# Patient Record
Sex: MALE | Race: WHITE | NOT HISPANIC OR LATINO | Employment: FULL TIME | URBAN - NONMETROPOLITAN AREA
[De-identification: names, ages, dates, MRNs, and addresses within clinical notes are randomized per-mention and may not be internally consistent; named-entity substitution may affect disease eponyms.]

---

## 2020-09-15 ENCOUNTER — TRANSCRIBE ORDERS (OUTPATIENT)
Dept: ADMINISTRATIVE | Facility: HOSPITAL | Age: 50
End: 2020-09-15

## 2020-09-15 DIAGNOSIS — Z01.818 PREOP EXAMINATION: Primary | ICD-10-CM

## 2021-01-01 ENCOUNTER — APPOINTMENT (OUTPATIENT)
Dept: GENERAL RADIOLOGY | Facility: HOSPITAL | Age: 51
End: 2021-01-01

## 2021-01-01 ENCOUNTER — APPOINTMENT (OUTPATIENT)
Dept: ULTRASOUND IMAGING | Facility: HOSPITAL | Age: 51
End: 2021-01-01

## 2021-01-01 ENCOUNTER — APPOINTMENT (OUTPATIENT)
Dept: CARDIOLOGY | Facility: HOSPITAL | Age: 51
End: 2021-01-01

## 2021-01-01 ENCOUNTER — APPOINTMENT (OUTPATIENT)
Dept: CT IMAGING | Facility: HOSPITAL | Age: 51
End: 2021-01-01

## 2021-01-01 ENCOUNTER — HOSPITAL ENCOUNTER (EMERGENCY)
Facility: HOSPITAL | Age: 51
End: 2021-01-01

## 2021-01-01 ENCOUNTER — APPOINTMENT (OUTPATIENT)
Dept: MRI IMAGING | Facility: HOSPITAL | Age: 51
End: 2021-01-01

## 2021-01-01 ENCOUNTER — APPOINTMENT (OUTPATIENT)
Dept: NUCLEAR MEDICINE | Facility: HOSPITAL | Age: 51
End: 2021-01-01

## 2021-01-01 ENCOUNTER — HOSPITAL ENCOUNTER (EMERGENCY)
Facility: HOSPITAL | Age: 51
Discharge: HOME | End: 2021-08-04
Attending: EMERGENCY MEDICINE | Admitting: EMERGENCY MEDICINE

## 2021-01-01 ENCOUNTER — HOSPITAL ENCOUNTER (INPATIENT)
Facility: HOSPITAL | Age: 51
LOS: 21 days | End: 2021-10-06
Attending: STUDENT IN AN ORGANIZED HEALTH CARE EDUCATION/TRAINING PROGRAM | Admitting: HOSPITALIST

## 2021-01-01 VITALS
RESPIRATION RATE: 21 BRPM | SYSTOLIC BLOOD PRESSURE: 107 MMHG | TEMPERATURE: 98 F | OXYGEN SATURATION: 99 % | HEIGHT: 68 IN | WEIGHT: 210 LBS | DIASTOLIC BLOOD PRESSURE: 58 MMHG | HEART RATE: 77 BPM | BODY MASS INDEX: 31.83 KG/M2

## 2021-01-01 DIAGNOSIS — K29.90 GASTRODUODENITIS: ICD-10-CM

## 2021-01-01 DIAGNOSIS — E13.11 DIABETIC KETOACIDOSIS WITH COMA ASSOCIATED WITH OTHER SPECIFIED DIABETES MELLITUS (HCC): Primary | ICD-10-CM

## 2021-01-01 DIAGNOSIS — J15.9 BACTERIAL PNEUMONIA: ICD-10-CM

## 2021-01-01 DIAGNOSIS — E83.39 HYPERPHOSPHATEMIA: ICD-10-CM

## 2021-01-01 DIAGNOSIS — R25.1 TREMOR: Primary | ICD-10-CM

## 2021-01-01 DIAGNOSIS — N17.9 AKI (ACUTE KIDNEY INJURY) (HCC): ICD-10-CM

## 2021-01-01 DIAGNOSIS — R57.9 SHOCK (HCC): ICD-10-CM

## 2021-01-01 DIAGNOSIS — K85.90 ACUTE PANCREATITIS, UNSPECIFIED COMPLICATION STATUS, UNSPECIFIED PANCREATITIS TYPE: ICD-10-CM

## 2021-01-01 DIAGNOSIS — U07.1 COVID-19: ICD-10-CM

## 2021-01-01 DIAGNOSIS — J96.01 ACUTE RESPIRATORY FAILURE WITH HYPOXIA (HCC): ICD-10-CM

## 2021-01-01 LAB
A FLAVUS AB SER QL ID: NEGATIVE
A FUMIGATUS AB SER QL ID: NEGATIVE
A NIGER AB SER QL ID: NEGATIVE
A-A DO2: 166.5 MMHG (ref 0–300)
A-A DO2: 193.5 MMHG (ref 0–300)
A-A DO2: 225.5 MMHG (ref 0–300)
A-A DO2: 253.3 MMHG (ref 0–300)
A-A DO2: 269.1 MMHG (ref 0–300)
A-A DO2: 281.9 MMHG (ref 0–300)
A-A DO2: 283.8 MMHG (ref 0–300)
A-A DO2: 296.7 MMHG (ref 0–300)
A-A DO2: 297.6 MMHG (ref 0–300)
A-A DO2: 308.9 MMHG (ref 0–300)
A-A DO2: 321.2 MMHG (ref 0–300)
A-A DO2: 324.9 MMHG (ref 0–300)
A-A DO2: 331 MMHG (ref 0–300)
A-A DO2: 340.5 MMHG (ref 0–300)
A-A DO2: 379.8 MMHG (ref 0–300)
A-A DO2: 385.6 MMHG (ref 0–300)
A-A DO2: 400 MMHG (ref 0–300)
A-A DO2: 408 MMHG (ref 0–300)
A-A DO2: 409.4 MMHG (ref 0–300)
A-A DO2: 409.9 MMHG (ref 0–300)
A-A DO2: 410.1 MMHG (ref 0–300)
A-A DO2: 429.9 MMHG (ref 0–300)
A-A DO2: 437.2 MMHG (ref 0–300)
A-A DO2: 441.5 MMHG (ref 0–300)
A-A DO2: 442.6 MMHG (ref 0–300)
A-A DO2: 446.7 MMHG (ref 0–300)
A-A DO2: 495 MMHG (ref 0–300)
A-A DO2: 547.8 MMHG (ref 0–300)
A-A DO2: 550.1 MMHG (ref 0–300)
A-A DO2: 581.8 MMHG (ref 0–300)
A-A DO2: 588.3 MMHG (ref 0–300)
A-A DO2: 609.6 MMHG (ref 0–300)
ABO GROUP BLD: NORMAL
ACETONE BLD QL: ABNORMAL
ALBUMIN SERPL-MCNC: 1.46 G/DL (ref 3.5–5.2)
ALBUMIN SERPL-MCNC: 1.68 G/DL (ref 3.5–5.2)
ALBUMIN SERPL-MCNC: 1.78 G/DL (ref 3.5–5.2)
ALBUMIN SERPL-MCNC: 1.89 G/DL (ref 3.5–5.2)
ALBUMIN SERPL-MCNC: 1.9 G/DL (ref 3.5–5.2)
ALBUMIN SERPL-MCNC: 1.91 G/DL (ref 3.5–5.2)
ALBUMIN SERPL-MCNC: 1.91 G/DL (ref 3.5–5.2)
ALBUMIN SERPL-MCNC: 1.97 G/DL (ref 3.5–5.2)
ALBUMIN SERPL-MCNC: 2.05 G/DL (ref 3.5–5.2)
ALBUMIN SERPL-MCNC: 2.06 G/DL (ref 3.5–5.2)
ALBUMIN SERPL-MCNC: 2.28 G/DL (ref 3.5–5.2)
ALBUMIN SERPL-MCNC: 2.28 G/DL (ref 3.5–5.2)
ALBUMIN SERPL-MCNC: 2.29 G/DL (ref 3.5–5.2)
ALBUMIN SERPL-MCNC: 2.29 G/DL (ref 3.5–5.2)
ALBUMIN SERPL-MCNC: 2.3 G/DL (ref 3.5–5.2)
ALBUMIN SERPL-MCNC: 2.41 G/DL (ref 3.5–5.2)
ALBUMIN SERPL-MCNC: 2.48 G/DL (ref 3.5–5.2)
ALBUMIN SERPL-MCNC: 2.49 G/DL (ref 3.5–5.2)
ALBUMIN SERPL-MCNC: 2.5 G/DL (ref 3.5–5.2)
ALBUMIN SERPL-MCNC: 2.55 G/DL (ref 3.5–5.2)
ALBUMIN SERPL-MCNC: 2.58 G/DL (ref 3.5–5.2)
ALBUMIN SERPL-MCNC: 2.7 G/DL (ref 3.5–5.2)
ALBUMIN SERPL-MCNC: 2.76 G/DL (ref 3.5–5.2)
ALBUMIN SERPL-MCNC: 2.83 G/DL (ref 3.5–5.2)
ALBUMIN SERPL-MCNC: 2.85 G/DL (ref 3.5–5.2)
ALBUMIN SERPL-MCNC: 2.88 G/DL (ref 3.5–5.2)
ALBUMIN SERPL-MCNC: 3.24 G/DL (ref 3.5–5.2)
ALBUMIN SERPL-MCNC: 3.69 G/DL (ref 3.5–5.2)
ALBUMIN SERPL-MCNC: 4.67 G/DL (ref 3.5–5.2)
ALBUMIN/GLOB SERPL: 0.5 G/DL
ALBUMIN/GLOB SERPL: 0.6 G/DL
ALBUMIN/GLOB SERPL: 0.7 G/DL
ALBUMIN/GLOB SERPL: 0.8 G/DL
ALBUMIN/GLOB SERPL: 0.9 G/DL
ALBUMIN/GLOB SERPL: 1.1 G/DL
ALBUMIN/GLOB SERPL: 1.1 G/DL
ALBUMIN/GLOB SERPL: 1.5 G/DL
ALP SERPL-CCNC: 104 U/L (ref 39–117)
ALP SERPL-CCNC: 113 U/L (ref 39–117)
ALP SERPL-CCNC: 116 U/L (ref 39–117)
ALP SERPL-CCNC: 119 U/L (ref 39–117)
ALP SERPL-CCNC: 120 U/L (ref 39–117)
ALP SERPL-CCNC: 125 U/L (ref 39–117)
ALP SERPL-CCNC: 136 U/L (ref 39–117)
ALP SERPL-CCNC: 141 U/L (ref 39–117)
ALP SERPL-CCNC: 146 U/L (ref 39–117)
ALP SERPL-CCNC: 153 U/L (ref 39–117)
ALP SERPL-CCNC: 156 U/L (ref 39–117)
ALP SERPL-CCNC: 170 U/L (ref 39–117)
ALP SERPL-CCNC: 180 U/L (ref 39–117)
ALP SERPL-CCNC: 239 U/L (ref 39–117)
ALP SERPL-CCNC: 80 U/L (ref 39–117)
ALP SERPL-CCNC: 80 U/L (ref 39–117)
ALP SERPL-CCNC: 81 U/L (ref 39–117)
ALP SERPL-CCNC: 82 U/L (ref 39–117)
ALP SERPL-CCNC: 84 U/L (ref 39–117)
ALP SERPL-CCNC: 85 U/L (ref 39–117)
ALP SERPL-CCNC: 86 U/L (ref 39–117)
ALP SERPL-CCNC: 92 U/L (ref 39–117)
ALP SERPL-CCNC: 93 U/L (ref 39–117)
ALP SERPL-CCNC: 96 U/L (ref 39–117)
ALP SERPL-CCNC: 97 U/L (ref 39–117)
ALT SERPL W P-5'-P-CCNC: 10 U/L (ref 1–41)
ALT SERPL W P-5'-P-CCNC: 11 U/L (ref 1–41)
ALT SERPL W P-5'-P-CCNC: 12 U/L (ref 1–41)
ALT SERPL W P-5'-P-CCNC: 13 U/L (ref 1–41)
ALT SERPL W P-5'-P-CCNC: 14 U/L (ref 1–41)
ALT SERPL W P-5'-P-CCNC: 18 U/L (ref 1–41)
ALT SERPL W P-5'-P-CCNC: 19 U/L (ref 1–41)
ALT SERPL W P-5'-P-CCNC: 21 U/L (ref 1–41)
ALT SERPL W P-5'-P-CCNC: 22 U/L (ref 1–41)
ALT SERPL W P-5'-P-CCNC: 22 U/L (ref 1–41)
ALT SERPL W P-5'-P-CCNC: 23 U/L (ref 1–41)
ALT SERPL W P-5'-P-CCNC: 24 U/L (ref 1–41)
ALT SERPL W P-5'-P-CCNC: 25 U/L (ref 1–41)
ALT SERPL W P-5'-P-CCNC: 25 U/L (ref 1–41)
ALT SERPL W P-5'-P-CCNC: 28 U/L (ref 1–41)
ALT SERPL W P-5'-P-CCNC: 5 U/L (ref 1–41)
ALT SERPL W P-5'-P-CCNC: 6 U/L (ref 1–41)
ALT SERPL W P-5'-P-CCNC: 7 U/L (ref 1–41)
ALT SERPL W P-5'-P-CCNC: 7 U/L (ref 1–41)
ALT SERPL W P-5'-P-CCNC: 9 U/L (ref 1–41)
ALT SERPL W P-5'-P-CCNC: 9 U/L (ref 1–41)
ANA SER QL: NEGATIVE
ANION GAP SERPL CALCULATED.3IONS-SCNC: 10.1 MMOL/L (ref 5–15)
ANION GAP SERPL CALCULATED.3IONS-SCNC: 10.4 MMOL/L (ref 5–15)
ANION GAP SERPL CALCULATED.3IONS-SCNC: 11.1 MMOL/L (ref 5–15)
ANION GAP SERPL CALCULATED.3IONS-SCNC: 12 MMOL/L (ref 5–15)
ANION GAP SERPL CALCULATED.3IONS-SCNC: 12.9 MMOL/L (ref 5–15)
ANION GAP SERPL CALCULATED.3IONS-SCNC: 13.3 MMOL/L (ref 5–15)
ANION GAP SERPL CALCULATED.3IONS-SCNC: 14.2 MMOL/L (ref 5–15)
ANION GAP SERPL CALCULATED.3IONS-SCNC: 14.5 MMOL/L (ref 5–15)
ANION GAP SERPL CALCULATED.3IONS-SCNC: 14.6 MMOL/L (ref 5–15)
ANION GAP SERPL CALCULATED.3IONS-SCNC: 14.8 MMOL/L (ref 5–15)
ANION GAP SERPL CALCULATED.3IONS-SCNC: 15.3 MMOL/L (ref 5–15)
ANION GAP SERPL CALCULATED.3IONS-SCNC: 15.7 MMOL/L (ref 5–15)
ANION GAP SERPL CALCULATED.3IONS-SCNC: 16.5 MMOL/L (ref 5–15)
ANION GAP SERPL CALCULATED.3IONS-SCNC: 16.8 MMOL/L (ref 5–15)
ANION GAP SERPL CALCULATED.3IONS-SCNC: 16.8 MMOL/L (ref 5–15)
ANION GAP SERPL CALCULATED.3IONS-SCNC: 17 MMOL/L (ref 5–15)
ANION GAP SERPL CALCULATED.3IONS-SCNC: 17.3 MMOL/L (ref 5–15)
ANION GAP SERPL CALCULATED.3IONS-SCNC: 17.3 MMOL/L (ref 5–15)
ANION GAP SERPL CALCULATED.3IONS-SCNC: 17.4 MMOL/L (ref 5–15)
ANION GAP SERPL CALCULATED.3IONS-SCNC: 17.6 MMOL/L (ref 5–15)
ANION GAP SERPL CALCULATED.3IONS-SCNC: 17.7 MMOL/L (ref 5–15)
ANION GAP SERPL CALCULATED.3IONS-SCNC: 18.6 MMOL/L (ref 5–15)
ANION GAP SERPL CALCULATED.3IONS-SCNC: 18.7 MMOL/L (ref 5–15)
ANION GAP SERPL CALCULATED.3IONS-SCNC: 19.6 MMOL/L (ref 5–15)
ANION GAP SERPL CALCULATED.3IONS-SCNC: 20 MMOL/L (ref 5–15)
ANION GAP SERPL CALCULATED.3IONS-SCNC: 20.6 MMOL/L (ref 5–15)
ANION GAP SERPL CALCULATED.3IONS-SCNC: 20.6 MMOL/L (ref 5–15)
ANION GAP SERPL CALCULATED.3IONS-SCNC: 20.8 MMOL/L (ref 5–15)
ANION GAP SERPL CALCULATED.3IONS-SCNC: 21.7 MMOL/L (ref 5–15)
ANION GAP SERPL CALCULATED.3IONS-SCNC: 23 MMOL/L (ref 5–15)
ANION GAP SERPL CALCULATED.3IONS-SCNC: 24 MMOL/L (ref 5–15)
ANION GAP SERPL CALCULATED.3IONS-SCNC: 26 MMOL/L (ref 5–15)
ANION GAP SERPL CALCULATED.3IONS-SCNC: 29.1 MMOL/L (ref 5–15)
ANION GAP SERPL CALCULATED.3IONS-SCNC: 39.5 MMOL/L (ref 5–15)
ANION GAP SERPL CALCULATED.3IONS-SCNC: 40.2 MMOL/L (ref 5–15)
APTT PPP: 24.5 SECONDS (ref 25.5–35.4)
APTT PPP: 26.2 SECONDS (ref 25.5–35.4)
APTT PPP: 29.2 SECONDS (ref 25.5–35.4)
APTT PPP: 31.8 SECONDS (ref 25.5–35.4)
APTT PPP: 34.8 SECONDS (ref 25.5–35.4)
APTT PPP: 37.5 SECONDS (ref 25.5–35.4)
APTT PPP: 41.8 SECONDS (ref 25.5–35.4)
APTT PPP: 42.3 SECONDS (ref 25.5–35.4)
APTT PPP: 44.3 SECONDS (ref 25.5–35.4)
APTT PPP: 49 SECONDS (ref 25.5–35.4)
APTT PPP: 51.6 SECONDS (ref 25.5–35.4)
APTT PPP: 51.8 SECONDS (ref 25.5–35.4)
APTT PPP: 52.7 SECONDS (ref 25.5–35.4)
APTT PPP: 52.8 SECONDS (ref 25.5–35.4)
APTT PPP: 52.9 SECONDS (ref 25.5–35.4)
APTT PPP: 53.2 SECONDS (ref 25.5–35.4)
APTT PPP: 54.1 SECONDS (ref 25.5–35.4)
APTT PPP: 55.8 SECONDS (ref 25.5–35.4)
APTT PPP: 55.9 SECONDS (ref 25.5–35.4)
APTT PPP: 56.6 SECONDS (ref 25.5–35.4)
APTT PPP: 56.7 SECONDS (ref 25.5–35.4)
APTT PPP: 60 SECONDS (ref 25.5–35.4)
APTT PPP: 60.2 SECONDS (ref 25.5–35.4)
APTT PPP: 60.6 SECONDS (ref 25.5–35.4)
APTT PPP: 60.8 SECONDS (ref 25.5–35.4)
APTT PPP: 61 SECONDS (ref 25.5–35.4)
APTT PPP: 63.7 SECONDS (ref 25.5–35.4)
APTT PPP: 63.7 SECONDS (ref 25.5–35.4)
APTT PPP: 64.2 SECONDS (ref 25.5–35.4)
APTT PPP: 64.7 SECONDS (ref 25.5–35.4)
APTT PPP: 69.1 SECONDS (ref 25.5–35.4)
APTT PPP: 74.4 SECONDS (ref 25.5–35.4)
APTT PPP: 79.7 SECONDS (ref 25.5–35.4)
APTT PPP: 81.2 SECONDS (ref 25.5–35.4)
APTT PPP: 89.4 SECONDS (ref 25.5–35.4)
APTT PPP: 93.5 SECONDS (ref 25.5–35.4)
APTT PPP: 94.9 SECONDS (ref 25.5–35.4)
APTT PPP: 98 SECONDS (ref 25.5–35.4)
APTT PPP: 98.5 SECONDS (ref 25.5–35.4)
APTT PPP: 99.4 SECONDS (ref 25.5–35.4)
APTT PPP: >100 SECONDS (ref 25.5–35.4)
ARTERIAL PATENCY WRIST A: ABNORMAL
ARTERIAL PATENCY WRIST A: POSITIVE
AST SERPL-CCNC: 10 U/L (ref 1–40)
AST SERPL-CCNC: 14 U/L (ref 1–40)
AST SERPL-CCNC: 16 U/L (ref 1–40)
AST SERPL-CCNC: 17 U/L (ref 1–40)
AST SERPL-CCNC: 17 U/L (ref 1–40)
AST SERPL-CCNC: 18 U/L (ref 1–40)
AST SERPL-CCNC: 21 U/L (ref 1–40)
AST SERPL-CCNC: 22 U/L (ref 1–40)
AST SERPL-CCNC: 23 U/L (ref 1–40)
AST SERPL-CCNC: 27 U/L (ref 1–40)
AST SERPL-CCNC: 28 U/L (ref 1–40)
AST SERPL-CCNC: 35 U/L (ref 1–40)
AST SERPL-CCNC: 35 U/L (ref 1–40)
AST SERPL-CCNC: 37 U/L (ref 1–40)
AST SERPL-CCNC: 42 U/L (ref 1–40)
AST SERPL-CCNC: 46 U/L (ref 1–40)
AST SERPL-CCNC: 52 U/L (ref 1–40)
AST SERPL-CCNC: 63 U/L (ref 1–40)
AST SERPL-CCNC: 8 U/L (ref 1–40)
AST SERPL-CCNC: 9 U/L (ref 1–40)
ATMOSPHERIC PRESS: 724 MMHG
ATMOSPHERIC PRESS: 725 MMHG
ATMOSPHERIC PRESS: 726 MMHG
ATMOSPHERIC PRESS: 726 MMHG
ATMOSPHERIC PRESS: 727 MMHG
ATMOSPHERIC PRESS: 728 MMHG
ATMOSPHERIC PRESS: 729 MMHG
ATMOSPHERIC PRESS: 730 MMHG
ATMOSPHERIC PRESS: 731 MMHG
ATMOSPHERIC PRESS: 731 MMHG
ATMOSPHERIC PRESS: 732 MMHG
ATMOSPHERIC PRESS: 732 MMHG
ATMOSPHERIC PRESS: 733 MMHG
B DERMAT AB TITR SER: NEGATIVE {TITER}
B PARAPERT DNA SPEC QL NAA+PROBE: NOT DETECTED
B PERT DNA SPEC QL NAA+PROBE: NOT DETECTED
BACTERIA SPEC AEROBE CULT: NORMAL
BACTERIA SPEC RESP CULT: ABNORMAL
BACTERIA UR QL AUTO: ABNORMAL /HPF
BASE EXCESS BLDA CALC-SCNC: -0.3 MMOL/L (ref 0–2)
BASE EXCESS BLDA CALC-SCNC: -1.5 MMOL/L (ref 0–2)
BASE EXCESS BLDA CALC-SCNC: -1.6 MMOL/L (ref 0–2)
BASE EXCESS BLDA CALC-SCNC: -12.2 MMOL/L (ref 0–2)
BASE EXCESS BLDA CALC-SCNC: -13.8 MMOL/L (ref 0–2)
BASE EXCESS BLDA CALC-SCNC: -17.3 MMOL/L (ref 0–2)
BASE EXCESS BLDA CALC-SCNC: -17.3 MMOL/L (ref 0–2)
BASE EXCESS BLDA CALC-SCNC: -2.1 MMOL/L (ref 0–2)
BASE EXCESS BLDA CALC-SCNC: -2.5 MMOL/L (ref 0–2)
BASE EXCESS BLDA CALC-SCNC: -2.6 MMOL/L (ref 0–2)
BASE EXCESS BLDA CALC-SCNC: -2.6 MMOL/L (ref 0–2)
BASE EXCESS BLDA CALC-SCNC: -2.8 MMOL/L (ref 0–2)
BASE EXCESS BLDA CALC-SCNC: -25.4 MMOL/L (ref 0–2)
BASE EXCESS BLDA CALC-SCNC: -27.9 MMOL/L (ref 0–2)
BASE EXCESS BLDA CALC-SCNC: -3.3 MMOL/L (ref 0–2)
BASE EXCESS BLDA CALC-SCNC: -3.9 MMOL/L (ref 0–2)
BASE EXCESS BLDA CALC-SCNC: -4.4 MMOL/L (ref 0–2)
BASE EXCESS BLDA CALC-SCNC: -5.3 MMOL/L (ref 0–2)
BASE EXCESS BLDA CALC-SCNC: -5.7 MMOL/L (ref 0–2)
BASE EXCESS BLDA CALC-SCNC: -7.8 MMOL/L (ref 0–2)
BASE EXCESS BLDA CALC-SCNC: -8 MMOL/L (ref 0–2)
BASE EXCESS BLDA CALC-SCNC: -8.7 MMOL/L (ref 0–2)
BASE EXCESS BLDA CALC-SCNC: -9.2 MMOL/L (ref 0–2)
BASE EXCESS BLDA CALC-SCNC: -9.5 MMOL/L (ref 0–2)
BASE EXCESS BLDA CALC-SCNC: -9.8 MMOL/L (ref 0–2)
BASE EXCESS BLDA CALC-SCNC: 0.1 MMOL/L (ref 0–2)
BASE EXCESS BLDA CALC-SCNC: 0.2 MMOL/L (ref 0–2)
BASE EXCESS BLDA CALC-SCNC: 0.4 MMOL/L (ref 0–2)
BASE EXCESS BLDA CALC-SCNC: 1.2 MMOL/L (ref 0–2)
BASE EXCESS BLDA CALC-SCNC: 1.9 MMOL/L (ref 0–2)
BASE EXCESS BLDA CALC-SCNC: 2 MMOL/L (ref 0–2)
BASE EXCESS BLDA CALC-SCNC: 2.4 MMOL/L (ref 0–2)
BASE EXCESS BLDV CALC-SCNC: -12.4 MMOL/L (ref 0–2)
BASE EXCESS BLDV CALC-SCNC: -3.4 MMOL/L (ref 0–2)
BASOPHILS # BLD AUTO: 0.02 10*3/MM3 (ref 0–0.2)
BASOPHILS # BLD AUTO: 0.02 10*3/MM3 (ref 0–0.2)
BASOPHILS # BLD AUTO: 0.03 10*3/MM3 (ref 0–0.2)
BASOPHILS # BLD AUTO: 0.04 10*3/MM3 (ref 0–0.2)
BASOPHILS # BLD AUTO: 0.05 10*3/MM3 (ref 0–0.2)
BASOPHILS # BLD AUTO: 0.09 10*3/MM3 (ref 0–0.2)
BASOPHILS NFR BLD AUTO: 0.1 % (ref 0–1.5)
BASOPHILS NFR BLD AUTO: 0.1 % (ref 0–1.5)
BASOPHILS NFR BLD AUTO: 0.2 % (ref 0–1.5)
BASOPHILS NFR BLD AUTO: 0.3 % (ref 0–1.5)
BASOPHILS NFR BLD AUTO: 0.5 % (ref 0–1.5)
BASOPHILS NFR BLD AUTO: 0.6 % (ref 0–1.5)
BASOPHILS NFR BLD AUTO: 0.7 % (ref 0–1.5)
BASOPHILS NFR BLD AUTO: 0.7 % (ref 0–1.5)
BASOPHILS NFR BLD AUTO: 1.6 % (ref 0–1.5)
BDY SITE: ABNORMAL
BH CV ECHO MEAS - % IVS THICK: 11 %
BH CV ECHO MEAS - % LVPW THICK: 17.9 %
BH CV ECHO MEAS - ACS: 2.4 CM
BH CV ECHO MEAS - ACS: 2.6 CM
BH CV ECHO MEAS - AO MAX PG: 2.7 MMHG
BH CV ECHO MEAS - AO MAX PG: 4.8 MMHG
BH CV ECHO MEAS - AO MEAN PG: 1 MMHG
BH CV ECHO MEAS - AO MEAN PG: 3 MMHG
BH CV ECHO MEAS - AO ROOT AREA (BSA CORRECTED): 1.5
BH CV ECHO MEAS - AO ROOT AREA (BSA CORRECTED): 2
BH CV ECHO MEAS - AO ROOT AREA: 13.2 CM^2
BH CV ECHO MEAS - AO ROOT AREA: 8 CM^2
BH CV ECHO MEAS - AO ROOT DIAM: 3.2 CM
BH CV ECHO MEAS - AO ROOT DIAM: 4.1 CM
BH CV ECHO MEAS - AO V2 MAX: 109 CM/SEC
BH CV ECHO MEAS - AO V2 MAX: 81.8 CM/SEC
BH CV ECHO MEAS - AO V2 MEAN: 56.8 CM/SEC
BH CV ECHO MEAS - AO V2 MEAN: 77.7 CM/SEC
BH CV ECHO MEAS - AO V2 VTI: 18.3 CM
BH CV ECHO MEAS - AO V2 VTI: 19 CM
BH CV ECHO MEAS - BSA(HAYCOCK): 2.1 M^2
BH CV ECHO MEAS - BSA(HAYCOCK): 2.3 M^2
BH CV ECHO MEAS - BSA: 2.1 M^2
BH CV ECHO MEAS - BSA: 2.2 M^2
BH CV ECHO MEAS - BZI_BMI: 29.2 KILOGRAMS/M^2
BH CV ECHO MEAS - BZI_BMI: 33.7 KILOGRAMS/M^2
BH CV ECHO MEAS - BZI_METRIC_HEIGHT: 175.3 CM
BH CV ECHO MEAS - BZI_METRIC_HEIGHT: 175.3 CM
BH CV ECHO MEAS - BZI_METRIC_WEIGHT: 103.4 KG
BH CV ECHO MEAS - BZI_METRIC_WEIGHT: 89.8 KG
BH CV ECHO MEAS - EDV(CUBED): 106.8 ML
BH CV ECHO MEAS - EDV(CUBED): 91.1 ML
BH CV ECHO MEAS - EDV(MOD-SP4): 73.1 ML
BH CV ECHO MEAS - EDV(MOD-SP4): 79.7 ML
BH CV ECHO MEAS - EDV(TEICH): 104.7 ML
BH CV ECHO MEAS - EDV(TEICH): 92.4 ML
BH CV ECHO MEAS - EF(CUBED): 48.4 %
BH CV ECHO MEAS - EF(CUBED): 75.9 %
BH CV ECHO MEAS - EF(MOD-SP4): 44 %
BH CV ECHO MEAS - EF(MOD-SP4): 68.4 %
BH CV ECHO MEAS - EF(TEICH): 40.6 %
BH CV ECHO MEAS - EF(TEICH): 68 %
BH CV ECHO MEAS - ESV(CUBED): 22 ML
BH CV ECHO MEAS - ESV(CUBED): 55.1 ML
BH CV ECHO MEAS - ESV(MOD-SP4): 23.1 ML
BH CV ECHO MEAS - ESV(MOD-SP4): 44.6 ML
BH CV ECHO MEAS - ESV(TEICH): 29.6 ML
BH CV ECHO MEAS - ESV(TEICH): 62.1 ML
BH CV ECHO MEAS - FS: 19.8 %
BH CV ECHO MEAS - FS: 37.8 %
BH CV ECHO MEAS - IVS/LVPW: 1
BH CV ECHO MEAS - IVS/LVPW: 1.1
BH CV ECHO MEAS - IVSD: 1.1 CM
BH CV ECHO MEAS - IVSD: 1.1 CM
BH CV ECHO MEAS - IVSS: 1.3 CM
BH CV ECHO MEAS - LA DIMENSION: 3.2 CM
BH CV ECHO MEAS - LA/AO: 0.78
BH CV ECHO MEAS - LV DIASTOLIC VOL/BSA (35-75): 33.5 ML/M^2
BH CV ECHO MEAS - LV DIASTOLIC VOL/BSA (35-75): 38.7 ML/M^2
BH CV ECHO MEAS - LV MASS(C)D: 175 GRAMS
BH CV ECHO MEAS - LV MASS(C)D: 186.8 GRAMS
BH CV ECHO MEAS - LV MASS(C)DI: 80.1 GRAMS/M^2
BH CV ECHO MEAS - LV MASS(C)DI: 90.8 GRAMS/M^2
BH CV ECHO MEAS - LV MASS(C)S: 161.3 GRAMS
BH CV ECHO MEAS - LV MASS(C)SI: 78.4 GRAMS/M^2
BH CV ECHO MEAS - LV SYSTOLIC VOL/BSA (12-30): 10.6 ML/M^2
BH CV ECHO MEAS - LV SYSTOLIC VOL/BSA (12-30): 21.7 ML/M^2
BH CV ECHO MEAS - LVIDD: 4.5 CM
BH CV ECHO MEAS - LVIDD: 4.7 CM
BH CV ECHO MEAS - LVIDS: 2.8 CM
BH CV ECHO MEAS - LVIDS: 3.8 CM
BH CV ECHO MEAS - LVLD AP4: 5.9 CM
BH CV ECHO MEAS - LVLD AP4: 7.3 CM
BH CV ECHO MEAS - LVLS AP4: 5.6 CM
BH CV ECHO MEAS - LVLS AP4: 6.4 CM
BH CV ECHO MEAS - LVOT AREA (M): 3.5 CM^2
BH CV ECHO MEAS - LVOT AREA: 3.5 CM^2
BH CV ECHO MEAS - LVOT DIAM: 2.1 CM
BH CV ECHO MEAS - LVPWD: 1 CM
BH CV ECHO MEAS - LVPWD: 1.1 CM
BH CV ECHO MEAS - LVPWS: 1.2 CM
BH CV ECHO MEAS - MV A MAX VEL: 49.7 CM/SEC
BH CV ECHO MEAS - MV A MAX VEL: 61.7 CM/SEC
BH CV ECHO MEAS - MV E MAX VEL: 49.7 CM/SEC
BH CV ECHO MEAS - MV E MAX VEL: 53.1 CM/SEC
BH CV ECHO MEAS - MV E/A: 0.81
BH CV ECHO MEAS - MV E/A: 1.1
BH CV ECHO MEAS - PA ACC TIME: 0.07 SEC
BH CV ECHO MEAS - PA ACC TIME: 0.12 SEC
BH CV ECHO MEAS - PA PR(ACCEL): 26.8 MMHG
BH CV ECHO MEAS - PA PR(ACCEL): 45.7 MMHG
BH CV ECHO MEAS - RAP SYSTOLE: 10 MMHG
BH CV ECHO MEAS - RVSP: 35.2 MMHG
BH CV ECHO MEAS - SI(AO): 122 ML/M^2
BH CV ECHO MEAS - SI(AO): 67.4 ML/M^2
BH CV ECHO MEAS - SI(CUBED): 25.2 ML/M^2
BH CV ECHO MEAS - SI(CUBED): 31.7 ML/M^2
BH CV ECHO MEAS - SI(MOD-SP4): 17.1 ML/M^2
BH CV ECHO MEAS - SI(MOD-SP4): 22.9 ML/M^2
BH CV ECHO MEAS - SI(TEICH): 20.7 ML/M^2
BH CV ECHO MEAS - SI(TEICH): 28.8 ML/M^2
BH CV ECHO MEAS - SV(AO): 147.2 ML
BH CV ECHO MEAS - SV(AO): 250.8 ML
BH CV ECHO MEAS - SV(CUBED): 51.7 ML
BH CV ECHO MEAS - SV(CUBED): 69.2 ML
BH CV ECHO MEAS - SV(MOD-SP4): 35.1 ML
BH CV ECHO MEAS - SV(MOD-SP4): 50 ML
BH CV ECHO MEAS - SV(TEICH): 42.5 ML
BH CV ECHO MEAS - SV(TEICH): 62.9 ML
BH CV ECHO MEAS - TR MAX VEL: 251 CM/SEC
BILIRUB CONJ SERPL-MCNC: 0.2 MG/DL (ref 0–0.3)
BILIRUB CONJ SERPL-MCNC: 0.3 MG/DL (ref 0–0.3)
BILIRUB CONJ SERPL-MCNC: 0.4 MG/DL (ref 0–0.3)
BILIRUB CONJ SERPL-MCNC: <0.2 MG/DL (ref 0–0.3)
BILIRUB CONJ SERPL-MCNC: <0.2 MG/DL (ref 0–0.3)
BILIRUB INDIRECT SERPL-MCNC: ABNORMAL MG/DL
BILIRUB SERPL-MCNC: 0.2 MG/DL (ref 0–1.2)
BILIRUB SERPL-MCNC: 0.3 MG/DL (ref 0–1.2)
BILIRUB SERPL-MCNC: 0.4 MG/DL (ref 0–1.2)
BILIRUB SERPL-MCNC: 0.5 MG/DL (ref 0–1.2)
BILIRUB SERPL-MCNC: 0.6 MG/DL (ref 0–1.2)
BILIRUB UR QL STRIP: NEGATIVE
BLD GP AB SCN SERPL QL: NEGATIVE
BODY TEMPERATURE: 0 C
BODY TEMPERATURE: 37 C
BODY TEMPERATURE: 37 C
BUN SERPL-MCNC: 10 MG/DL (ref 6–20)
BUN SERPL-MCNC: 100 MG/DL (ref 6–20)
BUN SERPL-MCNC: 101 MG/DL (ref 6–20)
BUN SERPL-MCNC: 122 MG/DL (ref 6–20)
BUN SERPL-MCNC: 137 MG/DL (ref 6–20)
BUN SERPL-MCNC: 42 MG/DL (ref 6–20)
BUN SERPL-MCNC: 46 MG/DL (ref 6–20)
BUN SERPL-MCNC: 48 MG/DL (ref 6–20)
BUN SERPL-MCNC: 49 MG/DL (ref 6–20)
BUN SERPL-MCNC: 51 MG/DL (ref 6–20)
BUN SERPL-MCNC: 54 MG/DL (ref 6–20)
BUN SERPL-MCNC: 55 MG/DL (ref 6–20)
BUN SERPL-MCNC: 55 MG/DL (ref 6–20)
BUN SERPL-MCNC: 57 MG/DL (ref 6–20)
BUN SERPL-MCNC: 59 MG/DL (ref 6–20)
BUN SERPL-MCNC: 60 MG/DL (ref 6–20)
BUN SERPL-MCNC: 61 MG/DL (ref 6–20)
BUN SERPL-MCNC: 61 MG/DL (ref 6–20)
BUN SERPL-MCNC: 63 MG/DL (ref 6–20)
BUN SERPL-MCNC: 64 MG/DL (ref 6–20)
BUN SERPL-MCNC: 65 MG/DL (ref 6–20)
BUN SERPL-MCNC: 66 MG/DL (ref 6–20)
BUN SERPL-MCNC: 72 MG/DL (ref 6–20)
BUN SERPL-MCNC: 73 MG/DL (ref 6–20)
BUN SERPL-MCNC: 76 MG/DL (ref 6–20)
BUN SERPL-MCNC: 88 MG/DL (ref 6–20)
BUN SERPL-MCNC: 89 MG/DL (ref 6–20)
BUN SERPL-MCNC: 93 MG/DL (ref 6–20)
BUN/CREAT SERPL: 11.7 (ref 7–25)
BUN/CREAT SERPL: 12.8 (ref 7–25)
BUN/CREAT SERPL: 13.3 (ref 7–25)
BUN/CREAT SERPL: 13.7 (ref 7–25)
BUN/CREAT SERPL: 14.3 (ref 7–25)
BUN/CREAT SERPL: 14.3 (ref 7–25)
BUN/CREAT SERPL: 14.5 (ref 7–25)
BUN/CREAT SERPL: 15.2 (ref 7–25)
BUN/CREAT SERPL: 15.4 (ref 7–25)
BUN/CREAT SERPL: 16.4 (ref 7–25)
BUN/CREAT SERPL: 16.5 (ref 7–25)
BUN/CREAT SERPL: 16.6 (ref 7–25)
BUN/CREAT SERPL: 17.3 (ref 7–25)
BUN/CREAT SERPL: 17.4 (ref 7–25)
BUN/CREAT SERPL: 17.4 (ref 7–25)
BUN/CREAT SERPL: 17.5 (ref 7–25)
BUN/CREAT SERPL: 17.7 (ref 7–25)
BUN/CREAT SERPL: 17.7 (ref 7–25)
BUN/CREAT SERPL: 18 (ref 7–25)
BUN/CREAT SERPL: 18 (ref 7–25)
BUN/CREAT SERPL: 18.3 (ref 7–25)
BUN/CREAT SERPL: 18.3 (ref 7–25)
BUN/CREAT SERPL: 18.4 (ref 7–25)
BUN/CREAT SERPL: 18.6 (ref 7–25)
BUN/CREAT SERPL: 18.9 (ref 7–25)
BUN/CREAT SERPL: 19 (ref 7–25)
BUN/CREAT SERPL: 19.4 (ref 7–25)
BUN/CREAT SERPL: 19.6 (ref 7–25)
BUN/CREAT SERPL: 19.8 (ref 7–25)
BUN/CREAT SERPL: 19.8 (ref 7–25)
BUN/CREAT SERPL: 20.1 (ref 7–25)
BUN/CREAT SERPL: 20.4 (ref 7–25)
BUN/CREAT SERPL: 20.5 (ref 7–25)
BUN/CREAT SERPL: 20.5 (ref 7–25)
BUN/CREAT SERPL: 21.8 (ref 7–25)
C PNEUM DNA NPH QL NAA+NON-PROBE: NOT DETECTED
C-ANCA TITR SER IF: NORMAL TITER
C3 SERPL-MCNC: 75 MG/DL (ref 82–167)
C4 SERPL-MCNC: 20 MG/DL (ref 14–44)
CA-I SERPL ISE-MCNC: 0.89 MMOL/L (ref 1.12–1.32)
CALCIUM SPEC-SCNC: 5 MG/DL (ref 8.6–10.5)
CALCIUM SPEC-SCNC: 5.5 MG/DL (ref 8.6–10.5)
CALCIUM SPEC-SCNC: 5.8 MG/DL (ref 8.6–10.5)
CALCIUM SPEC-SCNC: 5.9 MG/DL (ref 8.6–10.5)
CALCIUM SPEC-SCNC: 5.9 MG/DL (ref 8.6–10.5)
CALCIUM SPEC-SCNC: 6.1 MG/DL (ref 8.6–10.5)
CALCIUM SPEC-SCNC: 6.3 MG/DL (ref 8.6–10.5)
CALCIUM SPEC-SCNC: 6.4 MG/DL (ref 8.6–10.5)
CALCIUM SPEC-SCNC: 6.5 MG/DL (ref 8.6–10.5)
CALCIUM SPEC-SCNC: 6.5 MG/DL (ref 8.6–10.5)
CALCIUM SPEC-SCNC: 6.7 MG/DL (ref 8.6–10.5)
CALCIUM SPEC-SCNC: 6.8 MG/DL (ref 8.6–10.5)
CALCIUM SPEC-SCNC: 6.9 MG/DL (ref 8.6–10.5)
CALCIUM SPEC-SCNC: 7.1 MG/DL (ref 8.6–10.5)
CALCIUM SPEC-SCNC: 7.2 MG/DL (ref 8.6–10.5)
CALCIUM SPEC-SCNC: 7.2 MG/DL (ref 8.6–10.5)
CALCIUM SPEC-SCNC: 7.3 MG/DL (ref 8.6–10.5)
CALCIUM SPEC-SCNC: 7.4 MG/DL (ref 8.6–10.5)
CALCIUM SPEC-SCNC: 7.4 MG/DL (ref 8.6–10.5)
CALCIUM SPEC-SCNC: 7.7 MG/DL (ref 8.6–10.5)
CALCIUM SPEC-SCNC: 7.8 MG/DL (ref 8.6–10.5)
CALCIUM SPEC-SCNC: 7.9 MG/DL (ref 8.6–10.5)
CALCIUM SPEC-SCNC: 8 MG/DL (ref 8.6–10.5)
CALCIUM SPEC-SCNC: 8.4 MG/DL (ref 8.6–10.5)
CALCIUM SPEC-SCNC: 8.5 MG/DL (ref 8.6–10.5)
CALCIUM SPEC-SCNC: 9.2 MG/DL (ref 8.6–10.5)
CALCIUM SPEC-SCNC: 9.6 MG/DL (ref 8.6–10.5)
CALCIUM SPEC-SCNC: 9.9 MG/DL (ref 8.6–10.5)
CCP IGA+IGG SERPL IA-ACNC: 4 UNITS (ref 0–19)
CH50 SERPL-ACNC: 41 U/ML
CHLORIDE SERPL-SCNC: 100 MMOL/L (ref 98–107)
CHLORIDE SERPL-SCNC: 100 MMOL/L (ref 98–107)
CHLORIDE SERPL-SCNC: 107 MMOL/L (ref 98–107)
CHLORIDE SERPL-SCNC: 110 MMOL/L (ref 98–107)
CHLORIDE SERPL-SCNC: 113 MMOL/L (ref 98–107)
CHLORIDE SERPL-SCNC: 116 MMOL/L (ref 98–107)
CHLORIDE SERPL-SCNC: 117 MMOL/L (ref 98–107)
CHLORIDE SERPL-SCNC: 117 MMOL/L (ref 98–107)
CHLORIDE SERPL-SCNC: 118 MMOL/L (ref 98–107)
CHLORIDE SERPL-SCNC: 118 MMOL/L (ref 98–107)
CHLORIDE SERPL-SCNC: 119 MMOL/L (ref 98–107)
CHLORIDE SERPL-SCNC: 119 MMOL/L (ref 98–107)
CHLORIDE SERPL-SCNC: 120 MMOL/L (ref 98–107)
CHLORIDE SERPL-SCNC: 121 MMOL/L (ref 98–107)
CHLORIDE SERPL-SCNC: 121 MMOL/L (ref 98–107)
CHLORIDE SERPL-SCNC: 122 MMOL/L (ref 98–107)
CHLORIDE SERPL-SCNC: 123 MMOL/L (ref 98–107)
CHLORIDE SERPL-SCNC: 84 MMOL/L (ref 98–107)
CHLORIDE SERPL-SCNC: 86 MMOL/L (ref 98–107)
CHLORIDE SERPL-SCNC: 87 MMOL/L (ref 98–107)
CHLORIDE SERPL-SCNC: 87 MMOL/L (ref 98–107)
CHLORIDE SERPL-SCNC: 88 MMOL/L (ref 98–107)
CHLORIDE SERPL-SCNC: 88 MMOL/L (ref 98–107)
CHLORIDE SERPL-SCNC: 89 MMOL/L (ref 98–107)
CHLORIDE SERPL-SCNC: 90 MMOL/L (ref 98–107)
CHLORIDE SERPL-SCNC: 91 MMOL/L (ref 98–107)
CHLORIDE SERPL-SCNC: 91 MMOL/L (ref 98–107)
CHLORIDE SERPL-SCNC: 93 MMOL/L (ref 98–107)
CHLORIDE SERPL-SCNC: 94 MMOL/L (ref 98–107)
CHLORIDE SERPL-SCNC: 98 MMOL/L (ref 98–107)
CHLORIDE SERPL-SCNC: 99 MMOL/L (ref 98–107)
CHOLEST SERPL-MCNC: 95 MG/DL (ref 0–200)
CHROMATIN AB SERPL-ACNC: <10 IU/ML (ref 0–14)
CK SERPL-CCNC: 193 U/L (ref 20–200)
CLARITY UR: CLEAR
CO2 BLDA-SCNC: 10.2 MMOL/L (ref 22–33)
CO2 BLDA-SCNC: 11.2 MMOL/L (ref 22–33)
CO2 BLDA-SCNC: 12.4 MMOL/L (ref 22–33)
CO2 BLDA-SCNC: 12.9 MMOL/L (ref 22–33)
CO2 BLDA-SCNC: 14.9 MMOL/L (ref 22–33)
CO2 BLDA-SCNC: 14.9 MMOL/L (ref 22–33)
CO2 BLDA-SCNC: 15.2 MMOL/L (ref 22–33)
CO2 BLDA-SCNC: 15.3 MMOL/L (ref 22–33)
CO2 BLDA-SCNC: 15.9 MMOL/L (ref 22–33)
CO2 BLDA-SCNC: 16.3 MMOL/L (ref 22–33)
CO2 BLDA-SCNC: 16.7 MMOL/L (ref 22–33)
CO2 BLDA-SCNC: 18.2 MMOL/L (ref 22–33)
CO2 BLDA-SCNC: 21.4 MMOL/L (ref 22–33)
CO2 BLDA-SCNC: 22.3 MMOL/L (ref 22–33)
CO2 BLDA-SCNC: 23.8 MMOL/L (ref 22–33)
CO2 BLDA-SCNC: 23.8 MMOL/L (ref 22–33)
CO2 BLDA-SCNC: 23.9 MMOL/L (ref 22–33)
CO2 BLDA-SCNC: 24 MMOL/L (ref 22–33)
CO2 BLDA-SCNC: 24.4 MMOL/L (ref 22–33)
CO2 BLDA-SCNC: 26.1 MMOL/L (ref 22–33)
CO2 BLDA-SCNC: 26.2 MMOL/L (ref 22–33)
CO2 BLDA-SCNC: 26.3 MMOL/L (ref 22–33)
CO2 BLDA-SCNC: 26.4 MMOL/L (ref 22–33)
CO2 BLDA-SCNC: 26.5 MMOL/L (ref 22–33)
CO2 BLDA-SCNC: 27.1 MMOL/L (ref 22–33)
CO2 BLDA-SCNC: 27.6 MMOL/L (ref 22–33)
CO2 BLDA-SCNC: 27.7 MMOL/L (ref 22–33)
CO2 BLDA-SCNC: 28 MMOL/L (ref 22–33)
CO2 BLDA-SCNC: 28 MMOL/L (ref 22–33)
CO2 BLDA-SCNC: 28.5 MMOL/L (ref 22–33)
CO2 BLDA-SCNC: 30.1 MMOL/L (ref 22–33)
CO2 BLDA-SCNC: 31 MMOL/L (ref 22–33)
CO2 BLDA-SCNC: 6 MMOL/L (ref 22–33)
CO2 BLDA-SCNC: 6.1 MMOL/L (ref 22–33)
CO2 SERPL-SCNC: 10 MMOL/L (ref 22–29)
CO2 SERPL-SCNC: 12 MMOL/L (ref 22–29)
CO2 SERPL-SCNC: 13.3 MMOL/L (ref 22–29)
CO2 SERPL-SCNC: 13.4 MMOL/L (ref 22–29)
CO2 SERPL-SCNC: 13.4 MMOL/L (ref 22–29)
CO2 SERPL-SCNC: 13.9 MMOL/L (ref 22–29)
CO2 SERPL-SCNC: 14.1 MMOL/L (ref 22–29)
CO2 SERPL-SCNC: 14.2 MMOL/L (ref 22–29)
CO2 SERPL-SCNC: 14.8 MMOL/L (ref 22–29)
CO2 SERPL-SCNC: 15 MMOL/L (ref 22–29)
CO2 SERPL-SCNC: 15 MMOL/L (ref 22–29)
CO2 SERPL-SCNC: 15.6 MMOL/L (ref 22–29)
CO2 SERPL-SCNC: 15.7 MMOL/L (ref 22–29)
CO2 SERPL-SCNC: 16.5 MMOL/L (ref 22–29)
CO2 SERPL-SCNC: 16.9 MMOL/L (ref 22–29)
CO2 SERPL-SCNC: 17.3 MMOL/L (ref 22–29)
CO2 SERPL-SCNC: 19 MMOL/L (ref 22–29)
CO2 SERPL-SCNC: 2.8 MMOL/L (ref 22–29)
CO2 SERPL-SCNC: 20.4 MMOL/L (ref 22–29)
CO2 SERPL-SCNC: 20.6 MMOL/L (ref 22–29)
CO2 SERPL-SCNC: 20.7 MMOL/L (ref 22–29)
CO2 SERPL-SCNC: 21 MMOL/L (ref 22–29)
CO2 SERPL-SCNC: 21.3 MMOL/L (ref 22–29)
CO2 SERPL-SCNC: 21.4 MMOL/L (ref 22–29)
CO2 SERPL-SCNC: 21.4 MMOL/L (ref 22–29)
CO2 SERPL-SCNC: 22.5 MMOL/L (ref 22–29)
CO2 SERPL-SCNC: 22.7 MMOL/L (ref 22–29)
CO2 SERPL-SCNC: 23.2 MMOL/L (ref 22–29)
CO2 SERPL-SCNC: 23.2 MMOL/L (ref 22–29)
CO2 SERPL-SCNC: 23.3 MMOL/L (ref 22–29)
CO2 SERPL-SCNC: 23.7 MMOL/L (ref 22–29)
CO2 SERPL-SCNC: 25.2 MMOL/L (ref 22–29)
CO2 SERPL-SCNC: 6.5 MMOL/L (ref 22–29)
CO2 SERPL-SCNC: 9.4 MMOL/L (ref 22–29)
CO2 SERPL-SCNC: 9.9 MMOL/L (ref 22–29)
COHGB MFR BLD: 0.4 % (ref 0–5)
COHGB MFR BLD: 0.5 % (ref 0–5)
COHGB MFR BLD: 0.6 % (ref 0–5)
COHGB MFR BLD: 0.7 % (ref 0–5)
COHGB MFR BLD: 0.8 % (ref 0–5)
COHGB MFR BLD: 0.9 % (ref 0–5)
COHGB MFR BLD: 1 % (ref 0–5)
COHGB MFR BLD: 1.1 % (ref 0–5)
COHGB MFR BLD: 1.1 % (ref 0–5)
COHGB MFR BLD: 1.2 % (ref 0–5)
COHGB MFR BLD: 1.2 % (ref 0–5)
COLOR UR: YELLOW
CREAT BLDA-MCNC: 0.7 MG/DL (ref 0.6–1.3)
CREAT SERPL-MCNC: 0.78 MG/DL (ref 0.76–1.27)
CREAT SERPL-MCNC: 2.56 MG/DL (ref 0.76–1.27)
CREAT SERPL-MCNC: 2.68 MG/DL (ref 0.76–1.27)
CREAT SERPL-MCNC: 2.77 MG/DL (ref 0.76–1.27)
CREAT SERPL-MCNC: 2.85 MG/DL (ref 0.76–1.27)
CREAT SERPL-MCNC: 2.89 MG/DL (ref 0.76–1.27)
CREAT SERPL-MCNC: 2.99 MG/DL (ref 0.76–1.27)
CREAT SERPL-MCNC: 3.07 MG/DL (ref 0.76–1.27)
CREAT SERPL-MCNC: 3.21 MG/DL (ref 0.76–1.27)
CREAT SERPL-MCNC: 3.23 MG/DL (ref 0.76–1.27)
CREAT SERPL-MCNC: 3.31 MG/DL (ref 0.76–1.27)
CREAT SERPL-MCNC: 3.32 MG/DL (ref 0.76–1.27)
CREAT SERPL-MCNC: 3.33 MG/DL (ref 0.76–1.27)
CREAT SERPL-MCNC: 3.33 MG/DL (ref 0.76–1.27)
CREAT SERPL-MCNC: 3.38 MG/DL (ref 0.76–1.27)
CREAT SERPL-MCNC: 3.42 MG/DL (ref 0.76–1.27)
CREAT SERPL-MCNC: 3.49 MG/DL (ref 0.76–1.27)
CREAT SERPL-MCNC: 3.6 MG/DL (ref 0.76–1.27)
CREAT SERPL-MCNC: 3.68 MG/DL (ref 0.76–1.27)
CREAT SERPL-MCNC: 3.7 MG/DL (ref 0.76–1.27)
CREAT SERPL-MCNC: 3.8 MG/DL (ref 0.76–1.27)
CREAT SERPL-MCNC: 3.8 MG/DL (ref 0.76–1.27)
CREAT SERPL-MCNC: 3.92 MG/DL (ref 0.76–1.27)
CREAT SERPL-MCNC: 3.92 MG/DL (ref 0.76–1.27)
CREAT SERPL-MCNC: 4.31 MG/DL (ref 0.76–1.27)
CREAT SERPL-MCNC: 4.51 MG/DL (ref 0.76–1.27)
CREAT SERPL-MCNC: 4.59 MG/DL (ref 0.76–1.27)
CREAT SERPL-MCNC: 4.7 MG/DL (ref 0.76–1.27)
CREAT SERPL-MCNC: 4.73 MG/DL (ref 0.76–1.27)
CREAT SERPL-MCNC: 5.04 MG/DL (ref 0.76–1.27)
CREAT SERPL-MCNC: 5.11 MG/DL (ref 0.76–1.27)
CREAT SERPL-MCNC: 5.11 MG/DL (ref 0.76–1.27)
CREAT SERPL-MCNC: 5.95 MG/DL (ref 0.76–1.27)
CREAT SERPL-MCNC: 6.08 MG/DL (ref 0.76–1.27)
CREAT SERPL-MCNC: 7 MG/DL (ref 0.76–1.27)
CRP SERPL-MCNC: 10.11 MG/DL (ref 0–0.5)
CRP SERPL-MCNC: 10.58 MG/DL (ref 0–0.5)
CRP SERPL-MCNC: 10.9 MG/DL (ref 0–0.5)
CRP SERPL-MCNC: 10.92 MG/DL (ref 0–0.5)
CRP SERPL-MCNC: 11.03 MG/DL (ref 0–0.5)
CRP SERPL-MCNC: 14.81 MG/DL (ref 0–0.5)
CRP SERPL-MCNC: 16.2 MG/DL (ref 0–0.5)
CRP SERPL-MCNC: 18.37 MG/DL (ref 0–0.5)
CRP SERPL-MCNC: 2.12 MG/DL (ref 0–0.5)
CRP SERPL-MCNC: 2.78 MG/DL (ref 0–0.5)
CRP SERPL-MCNC: 2.78 MG/DL (ref 0–0.5)
CRP SERPL-MCNC: 20.43 MG/DL (ref 0–0.5)
CRP SERPL-MCNC: 22.44 MG/DL (ref 0–0.5)
CRP SERPL-MCNC: 23.64 MG/DL (ref 0–0.5)
CRP SERPL-MCNC: 25.25 MG/DL (ref 0–0.5)
CRP SERPL-MCNC: 3.03 MG/DL (ref 0–0.5)
CRP SERPL-MCNC: 4.46 MG/DL (ref 0–0.5)
CRP SERPL-MCNC: 4.65 MG/DL (ref 0–0.5)
CRP SERPL-MCNC: 4.74 MG/DL (ref 0–0.5)
CRP SERPL-MCNC: 6.21 MG/DL (ref 0–0.5)
CRP SERPL-MCNC: 6.24 MG/DL (ref 0–0.5)
CRP SERPL-MCNC: 7.74 MG/DL (ref 0–0.5)
CRP SERPL-MCNC: 9.04 MG/DL (ref 0–0.5)
CRP SERPL-MCNC: 9.31 MG/DL (ref 0–0.5)
D DIMER PPP FEU-MCNC: 11.35 MCGFEU/ML (ref 0–0.5)
D DIMER PPP FEU-MCNC: 14.03 MCGFEU/ML (ref 0–0.5)
D DIMER PPP FEU-MCNC: 17.05 MCGFEU/ML (ref 0–0.5)
D DIMER PPP FEU-MCNC: 19.03 MCGFEU/ML (ref 0–0.5)
D DIMER PPP FEU-MCNC: 6.45 MCGFEU/ML (ref 0–0.5)
D DIMER PPP FEU-MCNC: 9.17 MCGFEU/ML (ref 0–0.5)
D DIMER PPP FEU-MCNC: 9.82 MCGFEU/ML (ref 0–0.5)
D DIMER PPP FEU-MCNC: >20 MCGFEU/ML (ref 0–0.5)
D-LACTATE SERPL-SCNC: 2.9 MMOL/L (ref 0.5–2)
D-LACTATE SERPL-SCNC: 4.6 MMOL/L (ref 0.5–2)
D-LACTATE SERPL-SCNC: 6.1 MMOL/L (ref 0.5–2)
DEPRECATED RDW RBC AUTO: 40.5 FL (ref 37–54)
DEPRECATED RDW RBC AUTO: 41.9 FL (ref 37–54)
DEPRECATED RDW RBC AUTO: 42.3 FL (ref 37–54)
DEPRECATED RDW RBC AUTO: 42.4 FL (ref 37–54)
DEPRECATED RDW RBC AUTO: 42.4 FL (ref 37–54)
DEPRECATED RDW RBC AUTO: 42.7 FL (ref 37–54)
DEPRECATED RDW RBC AUTO: 43.3 FL (ref 37–54)
DEPRECATED RDW RBC AUTO: 43.4 FL (ref 37–54)
DEPRECATED RDW RBC AUTO: 43.5 FL (ref 37–54)
DEPRECATED RDW RBC AUTO: 43.5 FL (ref 37–54)
DEPRECATED RDW RBC AUTO: 43.7 FL (ref 37–54)
DEPRECATED RDW RBC AUTO: 43.8 FL (ref 37–54)
DEPRECATED RDW RBC AUTO: 43.9 FL (ref 37–54)
DEPRECATED RDW RBC AUTO: 43.9 FL (ref 37–54)
DEPRECATED RDW RBC AUTO: 44.2 FL (ref 37–54)
DEPRECATED RDW RBC AUTO: 44.8 FL (ref 37–54)
DEPRECATED RDW RBC AUTO: 44.8 FL (ref 37–54)
DEPRECATED RDW RBC AUTO: 45.1 FL (ref 37–54)
DEPRECATED RDW RBC AUTO: 45.4 FL (ref 37–54)
DEPRECATED RDW RBC AUTO: 46.1 FL (ref 37–54)
DEPRECATED RDW RBC AUTO: 46.4 FL (ref 37–54)
DEPRECATED RDW RBC AUTO: 47.6 FL (ref 37–54)
DEPRECATED RDW RBC AUTO: 47.8 FL (ref 37–54)
DEPRECATED RDW RBC AUTO: 49 FL (ref 37–54)
DEPRECATED RDW RBC AUTO: 51.8 FL (ref 37–54)
DSDNA AB SER-ACNC: <1 IU/ML (ref 0–9)
ENA SM AB SER-ACNC: <0.2 AI (ref 0–0.9)
ENA SS-A AB SER-ACNC: 0.2 AI (ref 0–0.9)
ENA SS-B AB SER-ACNC: <0.2 AI (ref 0–0.9)
EOSINOPHIL # BLD AUTO: 0 10*3/MM3 (ref 0–0.4)
EOSINOPHIL # BLD AUTO: 0 10*3/MM3 (ref 0–0.4)
EOSINOPHIL # BLD AUTO: 0.01 10*3/MM3 (ref 0–0.4)
EOSINOPHIL # BLD AUTO: 0.04 10*3/MM3 (ref 0–0.4)
EOSINOPHIL # BLD AUTO: 0.05 10*3/MM3 (ref 0–0.4)
EOSINOPHIL # BLD AUTO: 0.06 10*3/MM3 (ref 0–0.4)
EOSINOPHIL # BLD AUTO: 0.07 10*3/MM3 (ref 0–0.4)
EOSINOPHIL # BLD AUTO: 0.07 10*3/MM3 (ref 0–0.4)
EOSINOPHIL # BLD AUTO: 0.1 10*3/MM3 (ref 0–0.4)
EOSINOPHIL # BLD AUTO: 0.12 10*3/MM3 (ref 0–0.4)
EOSINOPHIL # BLD AUTO: 0.16 10*3/MM3 (ref 0–0.4)
EOSINOPHIL # BLD AUTO: 0.16 10*3/MM3 (ref 0–0.4)
EOSINOPHIL # BLD AUTO: 0.18 10*3/MM3 (ref 0–0.4)
EOSINOPHIL # BLD AUTO: 0.2 10*3/MM3 (ref 0–0.4)
EOSINOPHIL # BLD AUTO: 0.23 10*3/MM3 (ref 0–0.4)
EOSINOPHIL # BLD AUTO: 0.27 10*3/MM3 (ref 0–0.4)
EOSINOPHIL # BLD AUTO: 0.32 10*3/MM3 (ref 0–0.4)
EOSINOPHIL NFR BLD AUTO: 0 % (ref 0.3–6.2)
EOSINOPHIL NFR BLD AUTO: 0 % (ref 0.3–6.2)
EOSINOPHIL NFR BLD AUTO: 0.1 % (ref 0.3–6.2)
EOSINOPHIL NFR BLD AUTO: 0.3 % (ref 0.3–6.2)
EOSINOPHIL NFR BLD AUTO: 0.4 % (ref 0.3–6.2)
EOSINOPHIL NFR BLD AUTO: 0.5 % (ref 0.3–6.2)
EOSINOPHIL NFR BLD AUTO: 0.7 % (ref 0.3–6.2)
EOSINOPHIL NFR BLD AUTO: 1.5 % (ref 0.3–6.2)
EOSINOPHIL NFR BLD AUTO: 1.7 % (ref 0.3–6.2)
EOSINOPHIL NFR BLD AUTO: 1.8 % (ref 0.3–6.2)
EOSINOPHIL NFR BLD AUTO: 3.4 % (ref 0.3–6.2)
EOSINOPHIL NFR BLD AUTO: 3.5 % (ref 0.3–6.2)
EOSINOPHIL NFR BLD AUTO: 3.6 % (ref 0.3–6.2)
ERYTHROCYTE [DISTWIDTH] IN BLOOD BY AUTOMATED COUNT: 11.7 % (ref 12.3–15.4)
ERYTHROCYTE [DISTWIDTH] IN BLOOD BY AUTOMATED COUNT: 12.5 % (ref 12.3–15.4)
ERYTHROCYTE [DISTWIDTH] IN BLOOD BY AUTOMATED COUNT: 12.5 % (ref 12.3–15.4)
ERYTHROCYTE [DISTWIDTH] IN BLOOD BY AUTOMATED COUNT: 12.6 % (ref 12.3–15.4)
ERYTHROCYTE [DISTWIDTH] IN BLOOD BY AUTOMATED COUNT: 12.6 % (ref 12.3–15.4)
ERYTHROCYTE [DISTWIDTH] IN BLOOD BY AUTOMATED COUNT: 12.7 % (ref 12.3–15.4)
ERYTHROCYTE [DISTWIDTH] IN BLOOD BY AUTOMATED COUNT: 12.7 % (ref 12.3–15.4)
ERYTHROCYTE [DISTWIDTH] IN BLOOD BY AUTOMATED COUNT: 12.8 % (ref 12.3–15.4)
ERYTHROCYTE [DISTWIDTH] IN BLOOD BY AUTOMATED COUNT: 12.8 % (ref 12.3–15.4)
ERYTHROCYTE [DISTWIDTH] IN BLOOD BY AUTOMATED COUNT: 12.9 % (ref 12.3–15.4)
ERYTHROCYTE [DISTWIDTH] IN BLOOD BY AUTOMATED COUNT: 12.9 % (ref 12.3–15.4)
ERYTHROCYTE [DISTWIDTH] IN BLOOD BY AUTOMATED COUNT: 13 % (ref 12.3–15.4)
ERYTHROCYTE [DISTWIDTH] IN BLOOD BY AUTOMATED COUNT: 13.2 % (ref 12.3–15.4)
ERYTHROCYTE [DISTWIDTH] IN BLOOD BY AUTOMATED COUNT: 13.4 % (ref 12.3–15.4)
ERYTHROCYTE [DISTWIDTH] IN BLOOD BY AUTOMATED COUNT: 13.5 % (ref 12.3–15.4)
ERYTHROCYTE [DISTWIDTH] IN BLOOD BY AUTOMATED COUNT: 13.6 % (ref 12.3–15.4)
ERYTHROCYTE [DISTWIDTH] IN BLOOD BY AUTOMATED COUNT: 13.9 % (ref 12.3–15.4)
ERYTHROCYTE [DISTWIDTH] IN BLOOD BY AUTOMATED COUNT: 14.1 % (ref 12.3–15.4)
ERYTHROCYTE [SEDIMENTATION RATE] IN BLOOD: >100 MM/HR (ref 0–15)
FERRITIN SERPL-MCNC: 1934 NG/ML (ref 30–400)
FERRITIN SERPL-MCNC: 2355 NG/ML (ref 30–400)
FERRITIN SERPL-MCNC: 2438 NG/ML (ref 30–400)
FERRITIN SERPL-MCNC: 2501 NG/ML (ref 30–400)
FERRITIN SERPL-MCNC: 2568 NG/ML (ref 30–400)
FERRITIN SERPL-MCNC: 2649 NG/ML (ref 30–400)
FERRITIN SERPL-MCNC: 2887 NG/ML (ref 30–400)
FERRITIN SERPL-MCNC: 2919 NG/ML (ref 30–400)
FERRITIN SERPL-MCNC: 3505 NG/ML (ref 30–400)
FERRITIN SERPL-MCNC: 48 NG/ML (ref 30–400)
FERRITIN SERPL-MCNC: 6296 NG/ML (ref 30–400)
FERRITIN SERPL-MCNC: ABNORMAL NG/ML (ref 30–400)
FLUAV RNA RESP QL NAA+PROBE: NOT DETECTED
FLUAV SUBTYP SPEC NAA+PROBE: NOT DETECTED
FLUBV RNA ISLT QL NAA+PROBE: NOT DETECTED
FLUBV RNA RESP QL NAA+PROBE: NOT DETECTED
GALACTOMANNAN AG SPEC IA-ACNC: 1.88 INDEX (ref 0–0.49)
GAMMA INTERFERON BACKGROUND BLD IA-ACNC: 0.09 IU/ML
GAS FLOW AIRWAY: 15 LPM
GFR SERPL CREATININE-BSD FRML MDRD: 10 ML/MIN/1.73
GFR SERPL CREATININE-BSD FRML MDRD: 10 ML/MIN/1.73
GFR SERPL CREATININE-BSD FRML MDRD: 106 ML/MIN/1.73
GFR SERPL CREATININE-BSD FRML MDRD: 12 ML/MIN/1.73
GFR SERPL CREATININE-BSD FRML MDRD: 13 ML/MIN/1.73
GFR SERPL CREATININE-BSD FRML MDRD: 13 ML/MIN/1.73
GFR SERPL CREATININE-BSD FRML MDRD: 14 ML/MIN/1.73
GFR SERPL CREATININE-BSD FRML MDRD: 14 ML/MIN/1.73
GFR SERPL CREATININE-BSD FRML MDRD: 15 ML/MIN/1.73
GFR SERPL CREATININE-BSD FRML MDRD: 16 ML/MIN/1.73
GFR SERPL CREATININE-BSD FRML MDRD: 16 ML/MIN/1.73
GFR SERPL CREATININE-BSD FRML MDRD: 17 ML/MIN/1.73
GFR SERPL CREATININE-BSD FRML MDRD: 17 ML/MIN/1.73
GFR SERPL CREATININE-BSD FRML MDRD: 18 ML/MIN/1.73
GFR SERPL CREATININE-BSD FRML MDRD: 19 ML/MIN/1.73
GFR SERPL CREATININE-BSD FRML MDRD: 20 ML/MIN/1.73
GFR SERPL CREATININE-BSD FRML MDRD: 21 ML/MIN/1.73
GFR SERPL CREATININE-BSD FRML MDRD: 21 ML/MIN/1.73
GFR SERPL CREATININE-BSD FRML MDRD: 22 ML/MIN/1.73
GFR SERPL CREATININE-BSD FRML MDRD: 22 ML/MIN/1.73
GFR SERPL CREATININE-BSD FRML MDRD: 23 ML/MIN/1.73
GFR SERPL CREATININE-BSD FRML MDRD: 24 ML/MIN/1.73
GFR SERPL CREATININE-BSD FRML MDRD: 25 ML/MIN/1.73
GFR SERPL CREATININE-BSD FRML MDRD: 25 ML/MIN/1.73
GFR SERPL CREATININE-BSD FRML MDRD: 27 ML/MIN/1.73
GFR SERPL CREATININE-BSD FRML MDRD: 8 ML/MIN/1.73
GFR SERPL CREATININE-BSD FRML MDRD: ABNORMAL ML/MIN/{1.73_M2}
GLOBULIN UR ELPH-MCNC: 2.2 GM/DL
GLOBULIN UR ELPH-MCNC: 2.5 GM/DL
GLOBULIN UR ELPH-MCNC: 2.5 GM/DL
GLOBULIN UR ELPH-MCNC: 2.6 GM/DL
GLOBULIN UR ELPH-MCNC: 2.6 GM/DL
GLOBULIN UR ELPH-MCNC: 2.8 GM/DL
GLOBULIN UR ELPH-MCNC: 2.9 GM/DL
GLOBULIN UR ELPH-MCNC: 3.1 GM/DL
GLOBULIN UR ELPH-MCNC: 3.2 GM/DL
GLOBULIN UR ELPH-MCNC: 3.3 GM/DL
GLOBULIN UR ELPH-MCNC: 3.3 GM/DL
GLOBULIN UR ELPH-MCNC: 3.4 GM/DL
GLOBULIN UR ELPH-MCNC: 3.5 GM/DL
GLOBULIN UR ELPH-MCNC: 3.6 GM/DL
GLOBULIN UR ELPH-MCNC: 3.9 GM/DL
GLUCOSE BLDC GLUCOMTR-MCNC: 101 MG/DL (ref 70–130)
GLUCOSE BLDC GLUCOMTR-MCNC: 102 MG/DL (ref 70–130)
GLUCOSE BLDC GLUCOMTR-MCNC: 103 MG/DL (ref 70–130)
GLUCOSE BLDC GLUCOMTR-MCNC: 103 MG/DL (ref 70–130)
GLUCOSE BLDC GLUCOMTR-MCNC: 105 MG/DL (ref 70–130)
GLUCOSE BLDC GLUCOMTR-MCNC: 106 MG/DL (ref 70–130)
GLUCOSE BLDC GLUCOMTR-MCNC: 107 MG/DL (ref 70–130)
GLUCOSE BLDC GLUCOMTR-MCNC: 108 MG/DL (ref 70–130)
GLUCOSE BLDC GLUCOMTR-MCNC: 108 MG/DL (ref 70–130)
GLUCOSE BLDC GLUCOMTR-MCNC: 109 MG/DL (ref 70–130)
GLUCOSE BLDC GLUCOMTR-MCNC: 109 MG/DL (ref 70–130)
GLUCOSE BLDC GLUCOMTR-MCNC: 110 MG/DL (ref 70–130)
GLUCOSE BLDC GLUCOMTR-MCNC: 111 MG/DL (ref 70–130)
GLUCOSE BLDC GLUCOMTR-MCNC: 111 MG/DL (ref 70–130)
GLUCOSE BLDC GLUCOMTR-MCNC: 113 MG/DL (ref 70–130)
GLUCOSE BLDC GLUCOMTR-MCNC: 114 MG/DL (ref 70–130)
GLUCOSE BLDC GLUCOMTR-MCNC: 115 MG/DL (ref 70–130)
GLUCOSE BLDC GLUCOMTR-MCNC: 115 MG/DL (ref 70–130)
GLUCOSE BLDC GLUCOMTR-MCNC: 117 MG/DL (ref 70–130)
GLUCOSE BLDC GLUCOMTR-MCNC: 119 MG/DL (ref 70–130)
GLUCOSE BLDC GLUCOMTR-MCNC: 120 MG/DL (ref 70–130)
GLUCOSE BLDC GLUCOMTR-MCNC: 121 MG/DL (ref 70–130)
GLUCOSE BLDC GLUCOMTR-MCNC: 121 MG/DL (ref 70–130)
GLUCOSE BLDC GLUCOMTR-MCNC: 123 MG/DL (ref 70–130)
GLUCOSE BLDC GLUCOMTR-MCNC: 124 MG/DL (ref 70–130)
GLUCOSE BLDC GLUCOMTR-MCNC: 124 MG/DL (ref 70–130)
GLUCOSE BLDC GLUCOMTR-MCNC: 126 MG/DL (ref 70–130)
GLUCOSE BLDC GLUCOMTR-MCNC: 126 MG/DL (ref 70–130)
GLUCOSE BLDC GLUCOMTR-MCNC: 127 MG/DL (ref 70–130)
GLUCOSE BLDC GLUCOMTR-MCNC: 128 MG/DL (ref 70–130)
GLUCOSE BLDC GLUCOMTR-MCNC: 129 MG/DL (ref 70–130)
GLUCOSE BLDC GLUCOMTR-MCNC: 129 MG/DL (ref 70–130)
GLUCOSE BLDC GLUCOMTR-MCNC: 136 MG/DL (ref 70–130)
GLUCOSE BLDC GLUCOMTR-MCNC: 136 MG/DL (ref 70–130)
GLUCOSE BLDC GLUCOMTR-MCNC: 139 MG/DL (ref 70–130)
GLUCOSE BLDC GLUCOMTR-MCNC: 144 MG/DL (ref 70–130)
GLUCOSE BLDC GLUCOMTR-MCNC: 144 MG/DL (ref 70–130)
GLUCOSE BLDC GLUCOMTR-MCNC: 145 MG/DL (ref 70–130)
GLUCOSE BLDC GLUCOMTR-MCNC: 147 MG/DL (ref 70–130)
GLUCOSE BLDC GLUCOMTR-MCNC: 148 MG/DL (ref 70–130)
GLUCOSE BLDC GLUCOMTR-MCNC: 150 MG/DL (ref 70–130)
GLUCOSE BLDC GLUCOMTR-MCNC: 152 MG/DL (ref 70–130)
GLUCOSE BLDC GLUCOMTR-MCNC: 154 MG/DL (ref 70–130)
GLUCOSE BLDC GLUCOMTR-MCNC: 156 MG/DL (ref 70–130)
GLUCOSE BLDC GLUCOMTR-MCNC: 158 MG/DL (ref 70–130)
GLUCOSE BLDC GLUCOMTR-MCNC: 158 MG/DL (ref 70–130)
GLUCOSE BLDC GLUCOMTR-MCNC: 164 MG/DL (ref 70–130)
GLUCOSE BLDC GLUCOMTR-MCNC: 164 MG/DL (ref 70–130)
GLUCOSE BLDC GLUCOMTR-MCNC: 165 MG/DL (ref 70–130)
GLUCOSE BLDC GLUCOMTR-MCNC: 168 MG/DL (ref 70–130)
GLUCOSE BLDC GLUCOMTR-MCNC: 169 MG/DL (ref 70–130)
GLUCOSE BLDC GLUCOMTR-MCNC: 170 MG/DL (ref 70–130)
GLUCOSE BLDC GLUCOMTR-MCNC: 173 MG/DL (ref 70–130)
GLUCOSE BLDC GLUCOMTR-MCNC: 174 MG/DL (ref 70–130)
GLUCOSE BLDC GLUCOMTR-MCNC: 177 MG/DL (ref 70–130)
GLUCOSE BLDC GLUCOMTR-MCNC: 177 MG/DL (ref 70–130)
GLUCOSE BLDC GLUCOMTR-MCNC: 182 MG/DL (ref 70–130)
GLUCOSE BLDC GLUCOMTR-MCNC: 185 MG/DL (ref 70–130)
GLUCOSE BLDC GLUCOMTR-MCNC: 186 MG/DL (ref 70–130)
GLUCOSE BLDC GLUCOMTR-MCNC: 187 MG/DL (ref 70–130)
GLUCOSE BLDC GLUCOMTR-MCNC: 187 MG/DL (ref 70–130)
GLUCOSE BLDC GLUCOMTR-MCNC: 188 MG/DL (ref 70–130)
GLUCOSE BLDC GLUCOMTR-MCNC: 190 MG/DL (ref 70–130)
GLUCOSE BLDC GLUCOMTR-MCNC: 192 MG/DL (ref 70–130)
GLUCOSE BLDC GLUCOMTR-MCNC: 202 MG/DL (ref 70–130)
GLUCOSE BLDC GLUCOMTR-MCNC: 203 MG/DL (ref 70–130)
GLUCOSE BLDC GLUCOMTR-MCNC: 203 MG/DL (ref 70–130)
GLUCOSE BLDC GLUCOMTR-MCNC: 205 MG/DL (ref 70–130)
GLUCOSE BLDC GLUCOMTR-MCNC: 206 MG/DL (ref 70–130)
GLUCOSE BLDC GLUCOMTR-MCNC: 221 MG/DL (ref 70–130)
GLUCOSE BLDC GLUCOMTR-MCNC: 222 MG/DL (ref 70–130)
GLUCOSE BLDC GLUCOMTR-MCNC: 224 MG/DL (ref 70–130)
GLUCOSE BLDC GLUCOMTR-MCNC: 229 MG/DL (ref 70–130)
GLUCOSE BLDC GLUCOMTR-MCNC: 230 MG/DL (ref 70–130)
GLUCOSE BLDC GLUCOMTR-MCNC: 231 MG/DL (ref 70–130)
GLUCOSE BLDC GLUCOMTR-MCNC: 231 MG/DL (ref 70–130)
GLUCOSE BLDC GLUCOMTR-MCNC: 232 MG/DL (ref 70–130)
GLUCOSE BLDC GLUCOMTR-MCNC: 232 MG/DL (ref 70–130)
GLUCOSE BLDC GLUCOMTR-MCNC: 239 MG/DL (ref 70–130)
GLUCOSE BLDC GLUCOMTR-MCNC: 239 MG/DL (ref 70–130)
GLUCOSE BLDC GLUCOMTR-MCNC: 240 MG/DL (ref 70–130)
GLUCOSE BLDC GLUCOMTR-MCNC: 241 MG/DL (ref 70–130)
GLUCOSE BLDC GLUCOMTR-MCNC: 242 MG/DL (ref 70–130)
GLUCOSE BLDC GLUCOMTR-MCNC: 243 MG/DL (ref 70–130)
GLUCOSE BLDC GLUCOMTR-MCNC: 251 MG/DL (ref 70–130)
GLUCOSE BLDC GLUCOMTR-MCNC: 252 MG/DL (ref 70–130)
GLUCOSE BLDC GLUCOMTR-MCNC: 252 MG/DL (ref 70–130)
GLUCOSE BLDC GLUCOMTR-MCNC: 257 MG/DL (ref 70–130)
GLUCOSE BLDC GLUCOMTR-MCNC: 258 MG/DL (ref 70–130)
GLUCOSE BLDC GLUCOMTR-MCNC: 262 MG/DL (ref 70–130)
GLUCOSE BLDC GLUCOMTR-MCNC: 263 MG/DL (ref 70–130)
GLUCOSE BLDC GLUCOMTR-MCNC: 266 MG/DL (ref 70–130)
GLUCOSE BLDC GLUCOMTR-MCNC: 273 MG/DL (ref 70–130)
GLUCOSE BLDC GLUCOMTR-MCNC: 277 MG/DL (ref 70–130)
GLUCOSE BLDC GLUCOMTR-MCNC: 277 MG/DL (ref 70–130)
GLUCOSE BLDC GLUCOMTR-MCNC: 279 MG/DL (ref 70–130)
GLUCOSE BLDC GLUCOMTR-MCNC: 279 MG/DL (ref 70–130)
GLUCOSE BLDC GLUCOMTR-MCNC: 280 MG/DL (ref 70–130)
GLUCOSE BLDC GLUCOMTR-MCNC: 286 MG/DL (ref 70–130)
GLUCOSE BLDC GLUCOMTR-MCNC: 294 MG/DL (ref 70–130)
GLUCOSE BLDC GLUCOMTR-MCNC: 303 MG/DL (ref 70–130)
GLUCOSE BLDC GLUCOMTR-MCNC: 304 MG/DL (ref 70–130)
GLUCOSE BLDC GLUCOMTR-MCNC: 307 MG/DL (ref 70–130)
GLUCOSE BLDC GLUCOMTR-MCNC: 307 MG/DL (ref 70–130)
GLUCOSE BLDC GLUCOMTR-MCNC: 31 MG/DL (ref 70–130)
GLUCOSE BLDC GLUCOMTR-MCNC: 312 MG/DL (ref 70–130)
GLUCOSE BLDC GLUCOMTR-MCNC: 328 MG/DL (ref 70–130)
GLUCOSE BLDC GLUCOMTR-MCNC: 337 MG/DL (ref 70–130)
GLUCOSE BLDC GLUCOMTR-MCNC: 339 MG/DL (ref 70–130)
GLUCOSE BLDC GLUCOMTR-MCNC: 359 MG/DL (ref 70–130)
GLUCOSE BLDC GLUCOMTR-MCNC: 363 MG/DL (ref 70–130)
GLUCOSE BLDC GLUCOMTR-MCNC: 372 MG/DL (ref 70–130)
GLUCOSE BLDC GLUCOMTR-MCNC: 382 MG/DL (ref 70–130)
GLUCOSE BLDC GLUCOMTR-MCNC: 390 MG/DL (ref 70–130)
GLUCOSE BLDC GLUCOMTR-MCNC: 391 MG/DL (ref 70–130)
GLUCOSE BLDC GLUCOMTR-MCNC: 40 MG/DL (ref 70–130)
GLUCOSE BLDC GLUCOMTR-MCNC: 41 MG/DL (ref 70–130)
GLUCOSE BLDC GLUCOMTR-MCNC: 411 MG/DL (ref 70–130)
GLUCOSE BLDC GLUCOMTR-MCNC: 411 MG/DL (ref 70–130)
GLUCOSE BLDC GLUCOMTR-MCNC: 412 MG/DL (ref 70–130)
GLUCOSE BLDC GLUCOMTR-MCNC: 437 MG/DL (ref 70–130)
GLUCOSE BLDC GLUCOMTR-MCNC: 441 MG/DL (ref 70–130)
GLUCOSE BLDC GLUCOMTR-MCNC: 456 MG/DL (ref 70–130)
GLUCOSE BLDC GLUCOMTR-MCNC: 466 MG/DL (ref 70–130)
GLUCOSE BLDC GLUCOMTR-MCNC: 473 MG/DL (ref 70–130)
GLUCOSE BLDC GLUCOMTR-MCNC: 476 MG/DL (ref 70–130)
GLUCOSE BLDC GLUCOMTR-MCNC: 481 MG/DL (ref 70–130)
GLUCOSE BLDC GLUCOMTR-MCNC: 483 MG/DL (ref 70–130)
GLUCOSE BLDC GLUCOMTR-MCNC: 508 MG/DL (ref 70–130)
GLUCOSE BLDC GLUCOMTR-MCNC: 508 MG/DL (ref 70–130)
GLUCOSE BLDC GLUCOMTR-MCNC: 515 MG/DL (ref 70–130)
GLUCOSE BLDC GLUCOMTR-MCNC: 54 MG/DL (ref 70–130)
GLUCOSE BLDC GLUCOMTR-MCNC: 564 MG/DL (ref 70–130)
GLUCOSE BLDC GLUCOMTR-MCNC: 57 MG/DL (ref 70–130)
GLUCOSE BLDC GLUCOMTR-MCNC: 579 MG/DL (ref 70–130)
GLUCOSE BLDC GLUCOMTR-MCNC: 65 MG/DL (ref 70–130)
GLUCOSE BLDC GLUCOMTR-MCNC: 68 MG/DL (ref 70–130)
GLUCOSE BLDC GLUCOMTR-MCNC: 69 MG/DL (ref 70–130)
GLUCOSE BLDC GLUCOMTR-MCNC: 70 MG/DL (ref 70–130)
GLUCOSE BLDC GLUCOMTR-MCNC: 70 MG/DL (ref 70–130)
GLUCOSE BLDC GLUCOMTR-MCNC: 73 MG/DL (ref 70–130)
GLUCOSE BLDC GLUCOMTR-MCNC: 77 MG/DL (ref 70–130)
GLUCOSE BLDC GLUCOMTR-MCNC: 77 MG/DL (ref 70–130)
GLUCOSE BLDC GLUCOMTR-MCNC: 78 MG/DL (ref 70–130)
GLUCOSE BLDC GLUCOMTR-MCNC: 79 MG/DL (ref 70–130)
GLUCOSE BLDC GLUCOMTR-MCNC: 81 MG/DL (ref 70–130)
GLUCOSE BLDC GLUCOMTR-MCNC: 82 MG/DL (ref 70–130)
GLUCOSE BLDC GLUCOMTR-MCNC: 83 MG/DL (ref 70–130)
GLUCOSE BLDC GLUCOMTR-MCNC: 83 MG/DL (ref 70–130)
GLUCOSE BLDC GLUCOMTR-MCNC: 84 MG/DL (ref 70–130)
GLUCOSE BLDC GLUCOMTR-MCNC: 85 MG/DL (ref 70–130)
GLUCOSE BLDC GLUCOMTR-MCNC: 86 MG/DL (ref 70–130)
GLUCOSE BLDC GLUCOMTR-MCNC: 86 MG/DL (ref 70–130)
GLUCOSE BLDC GLUCOMTR-MCNC: 87 MG/DL (ref 70–130)
GLUCOSE BLDC GLUCOMTR-MCNC: 87 MG/DL (ref 70–130)
GLUCOSE BLDC GLUCOMTR-MCNC: 88 MG/DL (ref 70–130)
GLUCOSE BLDC GLUCOMTR-MCNC: 90 MG/DL (ref 70–130)
GLUCOSE BLDC GLUCOMTR-MCNC: 92 MG/DL (ref 70–130)
GLUCOSE BLDC GLUCOMTR-MCNC: 92 MG/DL (ref 70–130)
GLUCOSE BLDC GLUCOMTR-MCNC: 93 MG/DL (ref 70–130)
GLUCOSE BLDC GLUCOMTR-MCNC: 93 MG/DL (ref 70–130)
GLUCOSE BLDC GLUCOMTR-MCNC: 94 MG/DL (ref 70–130)
GLUCOSE BLDC GLUCOMTR-MCNC: 96 MG/DL (ref 70–130)
GLUCOSE BLDC GLUCOMTR-MCNC: 97 MG/DL (ref 70–130)
GLUCOSE BLDC GLUCOMTR-MCNC: 98 MG/DL (ref 70–130)
GLUCOSE BLDC GLUCOMTR-MCNC: 99 MG/DL (ref 70–130)
GLUCOSE BLDC GLUCOMTR-MCNC: >599 MG/DL (ref 70–130)
GLUCOSE SERPL-MCNC: 102 MG/DL (ref 65–99)
GLUCOSE SERPL-MCNC: 1104 MG/DL (ref 65–99)
GLUCOSE SERPL-MCNC: 115 MG/DL (ref 65–99)
GLUCOSE SERPL-MCNC: 116 MG/DL (ref 65–99)
GLUCOSE SERPL-MCNC: 128 MG/DL (ref 65–99)
GLUCOSE SERPL-MCNC: 139 MG/DL (ref 65–99)
GLUCOSE SERPL-MCNC: 146 MG/DL (ref 65–99)
GLUCOSE SERPL-MCNC: 151 MG/DL (ref 65–99)
GLUCOSE SERPL-MCNC: 153 MG/DL (ref 65–99)
GLUCOSE SERPL-MCNC: 157 MG/DL (ref 65–99)
GLUCOSE SERPL-MCNC: 167 MG/DL (ref 65–99)
GLUCOSE SERPL-MCNC: 168 MG/DL (ref 65–99)
GLUCOSE SERPL-MCNC: 174 MG/DL (ref 65–99)
GLUCOSE SERPL-MCNC: 203 MG/DL (ref 65–99)
GLUCOSE SERPL-MCNC: 203 MG/DL (ref 65–99)
GLUCOSE SERPL-MCNC: 209 MG/DL (ref 65–99)
GLUCOSE SERPL-MCNC: 209 MG/DL (ref 65–99)
GLUCOSE SERPL-MCNC: 243 MG/DL (ref 65–99)
GLUCOSE SERPL-MCNC: 245 MG/DL (ref 65–99)
GLUCOSE SERPL-MCNC: 251 MG/DL (ref 65–99)
GLUCOSE SERPL-MCNC: 252 MG/DL (ref 65–99)
GLUCOSE SERPL-MCNC: 286 MG/DL (ref 65–99)
GLUCOSE SERPL-MCNC: 303 MG/DL (ref 65–99)
GLUCOSE SERPL-MCNC: 304 MG/DL (ref 65–99)
GLUCOSE SERPL-MCNC: 32 MG/DL (ref 65–99)
GLUCOSE SERPL-MCNC: 431 MG/DL (ref 65–99)
GLUCOSE SERPL-MCNC: 44 MG/DL (ref 65–99)
GLUCOSE SERPL-MCNC: 464 MG/DL (ref 65–99)
GLUCOSE SERPL-MCNC: 466 MG/DL (ref 65–99)
GLUCOSE SERPL-MCNC: 510 MG/DL (ref 65–99)
GLUCOSE SERPL-MCNC: 681 MG/DL (ref 65–99)
GLUCOSE SERPL-MCNC: 81 MG/DL (ref 65–99)
GLUCOSE SERPL-MCNC: 826 MG/DL (ref 65–99)
GLUCOSE SERPL-MCNC: 85 MG/DL (ref 65–99)
GLUCOSE SERPL-MCNC: 87 MG/DL (ref 65–99)
GLUCOSE UR STRIP-MCNC: ABNORMAL MG/DL
GRAM STN SPEC: ABNORMAL
HADV DNA SPEC NAA+PROBE: NOT DETECTED
HAV IGM SERPL QL IA: NORMAL
HBA1C MFR BLD: 11.2 % (ref 4.8–5.6)
HBV CORE IGM SERPL QL IA: NORMAL
HBV SURFACE AG SERPL QL IA: NORMAL
HCO3 BLDA-SCNC: 10.3 MMOL/L (ref 20–26)
HCO3 BLDA-SCNC: 11.6 MMOL/L (ref 20–26)
HCO3 BLDA-SCNC: 12.1 MMOL/L (ref 20–26)
HCO3 BLDA-SCNC: 14.2 MMOL/L (ref 20–26)
HCO3 BLDA-SCNC: 14.4 MMOL/L (ref 20–26)
HCO3 BLDA-SCNC: 14.6 MMOL/L (ref 20–26)
HCO3 BLDA-SCNC: 15 MMOL/L (ref 20–26)
HCO3 BLDA-SCNC: 15.4 MMOL/L (ref 20–26)
HCO3 BLDA-SCNC: 15.8 MMOL/L (ref 20–26)
HCO3 BLDA-SCNC: 17.4 MMOL/L (ref 20–26)
HCO3 BLDA-SCNC: 20.3 MMOL/L (ref 20–26)
HCO3 BLDA-SCNC: 21.3 MMOL/L (ref 20–26)
HCO3 BLDA-SCNC: 22.4 MMOL/L (ref 20–26)
HCO3 BLDA-SCNC: 22.5 MMOL/L (ref 20–26)
HCO3 BLDA-SCNC: 22.7 MMOL/L (ref 20–26)
HCO3 BLDA-SCNC: 22.7 MMOL/L (ref 20–26)
HCO3 BLDA-SCNC: 24.6 MMOL/L (ref 20–26)
HCO3 BLDA-SCNC: 24.9 MMOL/L (ref 20–26)
HCO3 BLDA-SCNC: 25 MMOL/L (ref 20–26)
HCO3 BLDA-SCNC: 25 MMOL/L (ref 20–26)
HCO3 BLDA-SCNC: 25.2 MMOL/L (ref 20–26)
HCO3 BLDA-SCNC: 25.3 MMOL/L (ref 20–26)
HCO3 BLDA-SCNC: 26.4 MMOL/L (ref 20–26)
HCO3 BLDA-SCNC: 26.4 MMOL/L (ref 20–26)
HCO3 BLDA-SCNC: 26.5 MMOL/L (ref 20–26)
HCO3 BLDA-SCNC: 26.8 MMOL/L (ref 20–26)
HCO3 BLDA-SCNC: 26.9 MMOL/L (ref 20–26)
HCO3 BLDA-SCNC: 27.6 MMOL/L (ref 20–26)
HCO3 BLDA-SCNC: 27.8 MMOL/L (ref 20–26)
HCO3 BLDA-SCNC: 5.1 MMOL/L (ref 20–26)
HCO3 BLDA-SCNC: 5.4 MMOL/L (ref 20–26)
HCO3 BLDA-SCNC: 9.4 MMOL/L (ref 20–26)
HCO3 BLDV-SCNC: 13.9 MMOL/L (ref 22–28)
HCO3 BLDV-SCNC: 23 MMOL/L (ref 22–28)
HCOV 229E RNA SPEC QL NAA+PROBE: NOT DETECTED
HCOV HKU1 RNA SPEC QL NAA+PROBE: NOT DETECTED
HCOV NL63 RNA SPEC QL NAA+PROBE: NOT DETECTED
HCOV OC43 RNA SPEC QL NAA+PROBE: NOT DETECTED
HCT VFR BLD AUTO: 23.7 % (ref 37.5–51)
HCT VFR BLD AUTO: 24.9 % (ref 37.5–51)
HCT VFR BLD AUTO: 25.1 % (ref 37.5–51)
HCT VFR BLD AUTO: 25.6 % (ref 37.5–51)
HCT VFR BLD AUTO: 25.8 % (ref 37.5–51)
HCT VFR BLD AUTO: 26.4 % (ref 37.5–51)
HCT VFR BLD AUTO: 26.5 % (ref 37.5–51)
HCT VFR BLD AUTO: 26.7 % (ref 37.5–51)
HCT VFR BLD AUTO: 27.2 % (ref 37.5–51)
HCT VFR BLD AUTO: 27.3 % (ref 37.5–51)
HCT VFR BLD AUTO: 27.7 % (ref 37.5–51)
HCT VFR BLD AUTO: 28.2 % (ref 37.5–51)
HCT VFR BLD AUTO: 28.4 % (ref 37.5–51)
HCT VFR BLD AUTO: 29.6 % (ref 37.5–51)
HCT VFR BLD AUTO: 30.5 % (ref 37.5–51)
HCT VFR BLD AUTO: 31.8 % (ref 37.5–51)
HCT VFR BLD AUTO: 32.9 % (ref 37.5–51)
HCT VFR BLD AUTO: 35.3 % (ref 37.5–51)
HCT VFR BLD AUTO: 35.3 % (ref 37.5–51)
HCT VFR BLD AUTO: 36.1 % (ref 37.5–51)
HCT VFR BLD AUTO: 39.6 % (ref 37.5–51)
HCT VFR BLD AUTO: 46 % (ref 37.5–51)
HCT VFR BLD AUTO: 46.8 % (ref 37.5–51)
HCT VFR BLD AUTO: 47.5 % (ref 37.5–51)
HCT VFR BLD AUTO: 60.8 % (ref 37.5–51)
HCT VFR BLD CALC: 22.3 % (ref 38–51)
HCT VFR BLD CALC: 25.1 % (ref 38–51)
HCT VFR BLD CALC: 26 % (ref 38–51)
HCT VFR BLD CALC: 26 % (ref 38–51)
HCT VFR BLD CALC: 26.4 % (ref 38–51)
HCT VFR BLD CALC: 26.5 % (ref 38–51)
HCT VFR BLD CALC: 26.6 % (ref 38–51)
HCT VFR BLD CALC: 27.4 % (ref 38–51)
HCT VFR BLD CALC: 27.5 % (ref 38–51)
HCT VFR BLD CALC: 27.6 % (ref 38–51)
HCT VFR BLD CALC: 27.8 % (ref 38–51)
HCT VFR BLD CALC: 28.2 % (ref 38–51)
HCT VFR BLD CALC: 30.1 % (ref 38–51)
HCT VFR BLD CALC: 30.5 % (ref 38–51)
HCT VFR BLD CALC: 30.8 % (ref 38–51)
HCT VFR BLD CALC: 34.2 % (ref 38–51)
HCT VFR BLD CALC: 34.3 % (ref 38–51)
HCT VFR BLD CALC: 34.6 % (ref 38–51)
HCT VFR BLD CALC: 35.8 % (ref 38–51)
HCT VFR BLD CALC: 37.5 % (ref 38–51)
HCT VFR BLD CALC: 39.3 % (ref 38–51)
HCT VFR BLD CALC: 39.3 % (ref 38–51)
HCT VFR BLD CALC: 39.4 % (ref 38–51)
HCT VFR BLD CALC: 40.4 % (ref 38–51)
HCT VFR BLD CALC: 45.2 % (ref 38–51)
HCT VFR BLD CALC: 50.8 % (ref 38–51)
HCT VFR BLD CALC: 50.8 % (ref 38–51)
HCT VFR BLD CALC: 51.8 % (ref 38–51)
HCT VFR BLD CALC: 54.2 % (ref 38–51)
HCT VFR BLD CALC: 58.6 % (ref 38–51)
HCV AB SER DONR QL: NORMAL
HDLC SERPL-MCNC: 16 MG/DL (ref 40–60)
HGB BLD-MCNC: 10.3 G/DL (ref 13–17.7)
HGB BLD-MCNC: 10.3 G/DL (ref 13–17.7)
HGB BLD-MCNC: 10.9 G/DL (ref 13–17.7)
HGB BLD-MCNC: 10.9 G/DL (ref 13–17.7)
HGB BLD-MCNC: 12.1 G/DL (ref 13–17.7)
HGB BLD-MCNC: 12.3 G/DL (ref 13–17.7)
HGB BLD-MCNC: 12.4 G/DL (ref 13–17.7)
HGB BLD-MCNC: 13.2 G/DL (ref 13–17.7)
HGB BLD-MCNC: 15.3 G/DL (ref 13–17.7)
HGB BLD-MCNC: 15.6 G/DL (ref 13–17.7)
HGB BLD-MCNC: 16.3 G/DL (ref 13–17.7)
HGB BLD-MCNC: 18.4 G/DL (ref 13–17.7)
HGB BLD-MCNC: 8 G/DL (ref 13–17.7)
HGB BLD-MCNC: 8.3 G/DL (ref 13–17.7)
HGB BLD-MCNC: 8.4 G/DL (ref 13–17.7)
HGB BLD-MCNC: 8.5 G/DL (ref 13–17.7)
HGB BLD-MCNC: 8.9 G/DL (ref 13–17.7)
HGB BLD-MCNC: 9.3 G/DL (ref 13–17.7)
HGB BLD-MCNC: 9.4 G/DL (ref 13–17.7)
HGB BLD-MCNC: 9.6 G/DL (ref 13–17.7)
HGB BLD-MCNC: 9.9 G/DL (ref 13–17.7)
HGB BLDA-MCNC: 10.1 G/DL (ref 14–18)
HGB BLDA-MCNC: 10.7 G/DL (ref 14–18)
HGB BLDA-MCNC: 11.2 G/DL (ref 14–18)
HGB BLDA-MCNC: 11.2 G/DL (ref 14–18)
HGB BLDA-MCNC: 11.3 G/DL (ref 14–18)
HGB BLDA-MCNC: 11.7 G/DL (ref 14–18)
HGB BLDA-MCNC: 12.2 G/DL (ref 14–18)
HGB BLDA-MCNC: 12.8 G/DL (ref 14–18)
HGB BLDA-MCNC: 12.8 G/DL (ref 14–18)
HGB BLDA-MCNC: 12.9 G/DL (ref 14–18)
HGB BLDA-MCNC: 13.2 G/DL (ref 14–18)
HGB BLDA-MCNC: 14.8 G/DL (ref 14–18)
HGB BLDA-MCNC: 16 G/DL (ref 14–18)
HGB BLDA-MCNC: 16.6 G/DL (ref 14–18)
HGB BLDA-MCNC: 16.6 G/DL (ref 14–18)
HGB BLDA-MCNC: 16.9 G/DL (ref 14–18)
HGB BLDA-MCNC: 17.7 G/DL (ref 14–18)
HGB BLDA-MCNC: 19.1 G/DL (ref 14–18)
HGB BLDA-MCNC: 7.3 G/DL (ref 14–18)
HGB BLDA-MCNC: 8.2 G/DL (ref 14–18)
HGB BLDA-MCNC: 8.5 G/DL (ref 14–18)
HGB BLDA-MCNC: 8.5 G/DL (ref 14–18)
HGB BLDA-MCNC: 8.6 G/DL (ref 14–18)
HGB BLDA-MCNC: 8.6 G/DL (ref 14–18)
HGB BLDA-MCNC: 8.7 G/DL (ref 14–18)
HGB BLDA-MCNC: 8.9 G/DL (ref 14–18)
HGB BLDA-MCNC: 9 G/DL (ref 14–18)
HGB BLDA-MCNC: 9 G/DL (ref 14–18)
HGB BLDA-MCNC: 9.1 G/DL (ref 14–18)
HGB BLDA-MCNC: 9.2 G/DL (ref 14–18)
HGB BLDA-MCNC: 9.8 G/DL (ref 14–18)
HGB BLDA-MCNC: 9.9 G/DL (ref 14–18)
HGB UR QL STRIP.AUTO: ABNORMAL
HIV1+2 AB SER QL: NORMAL
HMPV RNA NPH QL NAA+NON-PROBE: NOT DETECTED
HOLD SPECIMEN: NORMAL
HPIV1 RNA SPEC QL NAA+PROBE: NOT DETECTED
HPIV2 RNA SPEC QL NAA+PROBE: NOT DETECTED
HPIV3 RNA NPH QL NAA+PROBE: NOT DETECTED
HPIV4 P GENE NPH QL NAA+PROBE: NOT DETECTED
HYALINE CASTS UR QL AUTO: ABNORMAL /LPF
HYPOCHROMIA BLD QL: ABNORMAL
IGG4 SER-MCNC: 21 MG/DL (ref 2–96)
IMM GRANULOCYTES # BLD AUTO: 0.04 10*3/MM3 (ref 0–0.05)
IMM GRANULOCYTES # BLD AUTO: 0.08 10*3/MM3 (ref 0–0.05)
IMM GRANULOCYTES # BLD AUTO: 0.1 10*3/MM3 (ref 0–0.05)
IMM GRANULOCYTES # BLD AUTO: 0.14 10*3/MM3 (ref 0–0.05)
IMM GRANULOCYTES # BLD AUTO: 0.14 10*3/MM3 (ref 0–0.05)
IMM GRANULOCYTES # BLD AUTO: 0.15 10*3/MM3 (ref 0–0.05)
IMM GRANULOCYTES # BLD AUTO: 0.16 10*3/MM3 (ref 0–0.05)
IMM GRANULOCYTES # BLD AUTO: 0.25 10*3/MM3 (ref 0–0.05)
IMM GRANULOCYTES # BLD AUTO: 0.3 10*3/MM3 (ref 0–0.05)
IMM GRANULOCYTES # BLD AUTO: 0.31 10*3/MM3 (ref 0–0.05)
IMM GRANULOCYTES # BLD AUTO: 0.33 10*3/MM3 (ref 0–0.05)
IMM GRANULOCYTES # BLD AUTO: 0.35 10*3/MM3 (ref 0–0.05)
IMM GRANULOCYTES # BLD AUTO: 0.36 10*3/MM3 (ref 0–0.05)
IMM GRANULOCYTES # BLD AUTO: 0.36 10*3/MM3 (ref 0–0.05)
IMM GRANULOCYTES # BLD AUTO: 0.42 10*3/MM3 (ref 0–0.05)
IMM GRANULOCYTES # BLD AUTO: 0.44 10*3/MM3 (ref 0–0.05)
IMM GRANULOCYTES # BLD AUTO: 0.46 10*3/MM3 (ref 0–0.05)
IMM GRANULOCYTES NFR BLD AUTO: 0.7 % (ref 0–0.5)
IMM GRANULOCYTES NFR BLD AUTO: 0.7 % (ref 0–0.5)
IMM GRANULOCYTES NFR BLD AUTO: 0.8 % (ref 0–0.5)
IMM GRANULOCYTES NFR BLD AUTO: 0.9 % (ref 0–0.5)
IMM GRANULOCYTES NFR BLD AUTO: 1.2 % (ref 0–0.5)
IMM GRANULOCYTES NFR BLD AUTO: 1.3 % (ref 0–0.5)
IMM GRANULOCYTES NFR BLD AUTO: 1.6 % (ref 0–0.5)
IMM GRANULOCYTES NFR BLD AUTO: 2 % (ref 0–0.5)
IMM GRANULOCYTES NFR BLD AUTO: 2.1 % (ref 0–0.5)
IMM GRANULOCYTES NFR BLD AUTO: 2.2 % (ref 0–0.5)
IMM GRANULOCYTES NFR BLD AUTO: 2.2 % (ref 0–0.5)
IMM GRANULOCYTES NFR BLD AUTO: 2.3 % (ref 0–0.5)
IMM GRANULOCYTES NFR BLD AUTO: 2.3 % (ref 0–0.5)
IMM GRANULOCYTES NFR BLD AUTO: 2.5 % (ref 0–0.5)
IMM GRANULOCYTES NFR BLD AUTO: 3.9 % (ref 0–0.5)
IMM GRANULOCYTES NFR BLD AUTO: 4.8 % (ref 0–0.5)
IMM GRANULOCYTES NFR BLD AUTO: 4.8 % (ref 0–0.5)
INHALED O2 CONCENTRATION: 100 %
INHALED O2 CONCENTRATION: 40 %
INHALED O2 CONCENTRATION: 40 %
INHALED O2 CONCENTRATION: 45 %
INHALED O2 CONCENTRATION: 55 %
INHALED O2 CONCENTRATION: 60 %
INHALED O2 CONCENTRATION: 70 %
INHALED O2 CONCENTRATION: 75 %
INHALED O2 CONCENTRATION: 75 %
INHALED O2 CONCENTRATION: 80 %
INHALED O2 CONCENTRATION: 90 %
INR PPP: 0.87 (ref 0.9–1.1)
INR PPP: 0.91 (ref 0.9–1.1)
INR PPP: 1.02 (ref 0.9–1.1)
KETONES UR QL STRIP: ABNORMAL
L PNEUMO1 AG UR QL IA: NEGATIVE
LARGE PLATELETS: ABNORMAL
LARGE PLATELETS: NORMAL
LDH SERPL-CCNC: 1123 U/L (ref 135–225)
LDH SERPL-CCNC: 225 U/L (ref 135–225)
LDH SERPL-CCNC: 238 U/L (ref 135–225)
LDH SERPL-CCNC: 291 U/L (ref 135–225)
LDH SERPL-CCNC: 357 U/L (ref 135–225)
LDH SERPL-CCNC: 564 U/L (ref 135–225)
LDH SERPL-CCNC: 624 U/L (ref 135–225)
LDH SERPL-CCNC: 714 U/L (ref 135–225)
LDH SERPL-CCNC: 764 U/L (ref 135–225)
LDH SERPL-CCNC: 815 U/L (ref 135–225)
LDH SERPL-CCNC: 929 U/L (ref 135–225)
LDH SERPL-CCNC: 977 U/L (ref 135–225)
LDLC SERPL CALC-MCNC: 55 MG/DL (ref 0–100)
LDLC/HDLC SERPL: 3.24 {RATIO}
LEUKOCYTE ESTERASE UR QL STRIP.AUTO: NEGATIVE
LIPASE SERPL-CCNC: 205 U/L (ref 13–60)
LIPASE SERPL-CCNC: >3000 U/L (ref 13–60)
LYMPHOCYTES # BLD AUTO: 0.29 10*3/MM3 (ref 0.7–3.1)
LYMPHOCYTES # BLD AUTO: 0.37 10*3/MM3 (ref 0.7–3.1)
LYMPHOCYTES # BLD AUTO: 0.43 10*3/MM3 (ref 0.7–3.1)
LYMPHOCYTES # BLD AUTO: 0.44 10*3/MM3 (ref 0.7–3.1)
LYMPHOCYTES # BLD AUTO: 0.6 10*3/MM3 (ref 0.7–3.1)
LYMPHOCYTES # BLD AUTO: 0.62 10*3/MM3 (ref 0.7–3.1)
LYMPHOCYTES # BLD AUTO: 0.64 10*3/MM3 (ref 0.7–3.1)
LYMPHOCYTES # BLD AUTO: 0.67 10*3/MM3 (ref 0.7–3.1)
LYMPHOCYTES # BLD AUTO: 0.7 10*3/MM3 (ref 0.7–3.1)
LYMPHOCYTES # BLD AUTO: 0.73 10*3/MM3 (ref 0.7–3.1)
LYMPHOCYTES # BLD AUTO: 0.74 10*3/MM3 (ref 0.7–3.1)
LYMPHOCYTES # BLD AUTO: 0.74 10*3/MM3 (ref 0.7–3.1)
LYMPHOCYTES # BLD AUTO: 0.76 10*3/MM3 (ref 0.7–3.1)
LYMPHOCYTES # BLD AUTO: 0.79 10*3/MM3 (ref 0.7–3.1)
LYMPHOCYTES # BLD AUTO: 0.91 10*3/MM3 (ref 0.7–3.1)
LYMPHOCYTES # BLD AUTO: 1.13 10*3/MM3 (ref 0.7–3.1)
LYMPHOCYTES # BLD AUTO: 1.88 10*3/MM3 (ref 0.7–3.1)
LYMPHOCYTES # BLD MANUAL: 0.18 10*3/MM3 (ref 0.7–3.1)
LYMPHOCYTES # BLD MANUAL: 0.51 10*3/MM3 (ref 0.7–3.1)
LYMPHOCYTES # BLD MANUAL: 0.57 10*3/MM3 (ref 0.7–3.1)
LYMPHOCYTES # BLD MANUAL: 0.64 10*3/MM3 (ref 0.7–3.1)
LYMPHOCYTES # BLD MANUAL: 0.66 10*3/MM3 (ref 0.7–3.1)
LYMPHOCYTES # BLD MANUAL: 0.84 10*3/MM3 (ref 0.7–3.1)
LYMPHOCYTES # BLD MANUAL: 1.08 10*3/MM3 (ref 0.7–3.1)
LYMPHOCYTES # BLD MANUAL: 2.27 10*3/MM3 (ref 0.7–3.1)
LYMPHOCYTES NFR BLD AUTO: 1.5 % (ref 19.6–45.3)
LYMPHOCYTES NFR BLD AUTO: 10.3 % (ref 19.6–45.3)
LYMPHOCYTES NFR BLD AUTO: 10.5 % (ref 19.6–45.3)
LYMPHOCYTES NFR BLD AUTO: 2.6 % (ref 19.6–45.3)
LYMPHOCYTES NFR BLD AUTO: 2.9 % (ref 19.6–45.3)
LYMPHOCYTES NFR BLD AUTO: 3.9 % (ref 19.6–45.3)
LYMPHOCYTES NFR BLD AUTO: 33 % (ref 19.6–45.3)
LYMPHOCYTES NFR BLD AUTO: 4 % (ref 19.6–45.3)
LYMPHOCYTES NFR BLD AUTO: 4.1 % (ref 19.6–45.3)
LYMPHOCYTES NFR BLD AUTO: 4.2 % (ref 19.6–45.3)
LYMPHOCYTES NFR BLD AUTO: 5 % (ref 19.6–45.3)
LYMPHOCYTES NFR BLD AUTO: 5.5 % (ref 19.6–45.3)
LYMPHOCYTES NFR BLD AUTO: 5.6 % (ref 19.6–45.3)
LYMPHOCYTES NFR BLD AUTO: 6.6 % (ref 19.6–45.3)
LYMPHOCYTES NFR BLD AUTO: 7 % (ref 19.6–45.3)
LYMPHOCYTES NFR BLD AUTO: 8.3 % (ref 19.6–45.3)
LYMPHOCYTES NFR BLD AUTO: 9.7 % (ref 19.6–45.3)
LYMPHOCYTES NFR BLD MANUAL: 1 % (ref 19.6–45.3)
LYMPHOCYTES NFR BLD MANUAL: 1 % (ref 5–12)
LYMPHOCYTES NFR BLD MANUAL: 2 % (ref 19.6–45.3)
LYMPHOCYTES NFR BLD MANUAL: 2 % (ref 5–12)
LYMPHOCYTES NFR BLD MANUAL: 3 % (ref 19.6–45.3)
LYMPHOCYTES NFR BLD MANUAL: 3 % (ref 19.6–45.3)
LYMPHOCYTES NFR BLD MANUAL: 3 % (ref 5–12)
LYMPHOCYTES NFR BLD MANUAL: 3 % (ref 5–12)
LYMPHOCYTES NFR BLD MANUAL: 4 % (ref 19.6–45.3)
LYMPHOCYTES NFR BLD MANUAL: 4 % (ref 19.6–45.3)
LYMPHOCYTES NFR BLD MANUAL: 4 % (ref 5–12)
LYMPHOCYTES NFR BLD MANUAL: 7 % (ref 19.6–45.3)
LYMPHOCYTES NFR BLD MANUAL: 8 % (ref 19.6–45.3)
LYMPHOCYTES NFR BLD MANUAL: 9 % (ref 5–12)
Lab: ABNORMAL
M PNEUMO IGG SER IA-ACNC: NOT DETECTED
M TB IFN-G BLD-IMP: ABNORMAL
M TB IFN-G CD4+ BCKGRND COR BLD-ACNC: 0.04 IU/ML
M TB IFN-G CD4+CD8+ BCKGRND COR BLD-ACNC: 0.05 IU/ML
MACROCYTES BLD QL SMEAR: ABNORMAL
MACROCYTES BLD QL SMEAR: ABNORMAL
MAGNESIUM SERPL-MCNC: 1.6 MG/DL (ref 1.6–2.6)
MAGNESIUM SERPL-MCNC: 1.7 MG/DL (ref 1.6–2.6)
MAGNESIUM SERPL-MCNC: 1.8 MG/DL (ref 1.6–2.6)
MAGNESIUM SERPL-MCNC: 1.9 MG/DL (ref 1.6–2.6)
MAGNESIUM SERPL-MCNC: 1.9 MG/DL (ref 1.6–2.6)
MAGNESIUM SERPL-MCNC: 2 MG/DL (ref 1.6–2.6)
MAGNESIUM SERPL-MCNC: 2.1 MG/DL (ref 1.6–2.6)
MAGNESIUM SERPL-MCNC: 2.1 MG/DL (ref 1.6–2.6)
MAGNESIUM SERPL-MCNC: 2.3 MG/DL (ref 1.6–2.6)
MAGNESIUM SERPL-MCNC: 2.3 MG/DL (ref 1.6–2.6)
MAGNESIUM SERPL-MCNC: 2.7 MG/DL (ref 1.6–2.6)
MAGNESIUM SERPL-MCNC: 2.8 MG/DL (ref 1.6–2.6)
MAGNESIUM SERPL-MCNC: 3.7 MG/DL (ref 1.6–2.6)
MAXIMAL PREDICTED HEART RATE: 171 BPM
MAXIMAL PREDICTED HEART RATE: 171 BPM
MCH RBC QN AUTO: 30 PG (ref 26.6–33)
MCH RBC QN AUTO: 30 PG (ref 26.6–33)
MCH RBC QN AUTO: 30.7 PG (ref 26.6–33)
MCH RBC QN AUTO: 30.8 PG (ref 26.6–33)
MCH RBC QN AUTO: 31 PG (ref 26.6–33)
MCH RBC QN AUTO: 31 PG (ref 26.6–33)
MCH RBC QN AUTO: 31.1 PG (ref 26.6–33)
MCH RBC QN AUTO: 31.1 PG (ref 26.6–33)
MCH RBC QN AUTO: 31.3 PG (ref 26.6–33)
MCH RBC QN AUTO: 31.3 PG (ref 26.6–33)
MCH RBC QN AUTO: 31.4 PG (ref 26.6–33)
MCH RBC QN AUTO: 31.5 PG (ref 26.6–33)
MCH RBC QN AUTO: 31.5 PG (ref 26.6–33)
MCH RBC QN AUTO: 31.6 PG (ref 26.6–33)
MCH RBC QN AUTO: 31.7 PG (ref 26.6–33)
MCH RBC QN AUTO: 31.8 PG (ref 26.6–33)
MCH RBC QN AUTO: 32 PG (ref 26.6–33)
MCH RBC QN AUTO: 32.1 PG (ref 26.6–33)
MCH RBC QN AUTO: 32.4 PG (ref 26.6–33)
MCHC RBC AUTO-ENTMCNC: 30.3 G/DL (ref 31.5–35.7)
MCHC RBC AUTO-ENTMCNC: 31.8 G/DL (ref 31.5–35.7)
MCHC RBC AUTO-ENTMCNC: 32.4 G/DL (ref 31.5–35.7)
MCHC RBC AUTO-ENTMCNC: 32.6 G/DL (ref 31.5–35.7)
MCHC RBC AUTO-ENTMCNC: 32.9 G/DL (ref 31.5–35.7)
MCHC RBC AUTO-ENTMCNC: 33 G/DL (ref 31.5–35.7)
MCHC RBC AUTO-ENTMCNC: 33.1 G/DL (ref 31.5–35.7)
MCHC RBC AUTO-ENTMCNC: 33.3 G/DL (ref 31.5–35.7)
MCHC RBC AUTO-ENTMCNC: 33.5 G/DL (ref 31.5–35.7)
MCHC RBC AUTO-ENTMCNC: 33.5 G/DL (ref 31.5–35.7)
MCHC RBC AUTO-ENTMCNC: 33.6 G/DL (ref 31.5–35.7)
MCHC RBC AUTO-ENTMCNC: 33.7 G/DL (ref 31.5–35.7)
MCHC RBC AUTO-ENTMCNC: 33.8 G/DL (ref 31.5–35.7)
MCHC RBC AUTO-ENTMCNC: 33.8 G/DL (ref 31.5–35.7)
MCHC RBC AUTO-ENTMCNC: 34.3 G/DL (ref 31.5–35.7)
MCHC RBC AUTO-ENTMCNC: 34.3 G/DL (ref 31.5–35.7)
MCHC RBC AUTO-ENTMCNC: 34.6 G/DL (ref 31.5–35.7)
MCHC RBC AUTO-ENTMCNC: 34.7 G/DL (ref 31.5–35.7)
MCHC RBC AUTO-ENTMCNC: 34.8 G/DL (ref 31.5–35.7)
MCHC RBC AUTO-ENTMCNC: 34.8 G/DL (ref 31.5–35.7)
MCHC RBC AUTO-ENTMCNC: 34.9 G/DL (ref 31.5–35.7)
MCHC RBC AUTO-ENTMCNC: 35.1 G/DL (ref 31.5–35.7)
MCV RBC AUTO: 105.7 FL (ref 79–97)
MCV RBC AUTO: 89.6 FL (ref 79–97)
MCV RBC AUTO: 90.2 FL (ref 79–97)
MCV RBC AUTO: 90.9 FL (ref 79–97)
MCV RBC AUTO: 91.1 FL (ref 79–97)
MCV RBC AUTO: 91.2 FL (ref 79–97)
MCV RBC AUTO: 91.3 FL (ref 79–97)
MCV RBC AUTO: 91.4 FL (ref 79–97)
MCV RBC AUTO: 91.5 FL (ref 79–97)
MCV RBC AUTO: 91.6 FL (ref 79–97)
MCV RBC AUTO: 91.9 FL (ref 79–97)
MCV RBC AUTO: 91.9 FL (ref 79–97)
MCV RBC AUTO: 92.1 FL (ref 79–97)
MCV RBC AUTO: 92.4 FL (ref 79–97)
MCV RBC AUTO: 93 FL (ref 79–97)
MCV RBC AUTO: 93 FL (ref 79–97)
MCV RBC AUTO: 93.5 FL (ref 79–97)
MCV RBC AUTO: 94 FL (ref 79–97)
MCV RBC AUTO: 94 FL (ref 79–97)
MCV RBC AUTO: 94.1 FL (ref 79–97)
MCV RBC AUTO: 94.3 FL (ref 79–97)
MCV RBC AUTO: 94.4 FL (ref 79–97)
MCV RBC AUTO: 94.5 FL (ref 79–97)
MCV RBC AUTO: 94.9 FL (ref 79–97)
MCV RBC AUTO: 96.4 FL (ref 79–97)
METAMYELOCYTES NFR BLD MANUAL: 1 % (ref 0–0)
METAMYELOCYTES NFR BLD MANUAL: 2 % (ref 0–0)
METHGB BLD QL: 0 % (ref 0–3)
METHGB BLD QL: 0 % (ref 0–3)
METHGB BLD QL: 0.1 % (ref 0–3)
METHGB BLD QL: 0.2 % (ref 0–3)
METHGB BLD QL: 0.3 % (ref 0–3)
METHGB BLD QL: 0.4 % (ref 0–3)
METHGB BLD QL: 0.5 % (ref 0–3)
METHGB BLD QL: 0.6 % (ref 0–3)
METHGB BLD QL: 0.7 % (ref 0–3)
METHGB BLD QL: 0.8 % (ref 0–3)
METHGB BLD QL: 0.9 % (ref 0–3)
METHGB BLD QL: <-0.1 % (ref 0–3)
MITOGEN IGNF BLD-ACNC: 0.11 IU/ML
MODALITY: ABNORMAL
MONOCYTES # BLD AUTO: 0.18 10*3/MM3 (ref 0.1–0.9)
MONOCYTES # BLD AUTO: 0.28 10*3/MM3 (ref 0.1–0.9)
MONOCYTES # BLD AUTO: 0.31 10*3/MM3 (ref 0.1–0.9)
MONOCYTES # BLD AUTO: 0.37 10*3/MM3 (ref 0.1–0.9)
MONOCYTES # BLD AUTO: 0.39 10*3/MM3 (ref 0.1–0.9)
MONOCYTES # BLD AUTO: 0.4 10*3/MM3 (ref 0.1–0.9)
MONOCYTES # BLD AUTO: 0.4 10*3/MM3 (ref 0.1–0.9)
MONOCYTES # BLD AUTO: 0.41 10*3/MM3 (ref 0.1–0.9)
MONOCYTES # BLD AUTO: 0.42 10*3/MM3 (ref 0.1–0.9)
MONOCYTES # BLD AUTO: 0.44 10*3/MM3 (ref 0.1–0.9)
MONOCYTES # BLD AUTO: 0.55 10*3/MM3 (ref 0.1–0.9)
MONOCYTES # BLD AUTO: 0.56 10*3/MM3 (ref 0.1–0.9)
MONOCYTES # BLD AUTO: 0.56 10*3/MM3 (ref 0.1–0.9)
MONOCYTES # BLD AUTO: 0.57 10*3/MM3 (ref 0.1–0.9)
MONOCYTES # BLD AUTO: 0.58 10*3/MM3 (ref 0.1–0.9)
MONOCYTES # BLD AUTO: 0.64 10*3/MM3 (ref 0.1–0.9)
MONOCYTES # BLD AUTO: 0.68 10*3/MM3 (ref 0.1–0.9)
MONOCYTES # BLD AUTO: 0.68 10*3/MM3 (ref 0.1–0.9)
MONOCYTES # BLD AUTO: 0.75 10*3/MM3 (ref 0.1–0.9)
MONOCYTES # BLD AUTO: 0.77 10*3/MM3 (ref 0.1–0.9)
MONOCYTES # BLD AUTO: 0.79 10*3/MM3 (ref 0.1–0.9)
MONOCYTES # BLD AUTO: 0.81 10*3/MM3 (ref 0.1–0.9)
MONOCYTES # BLD AUTO: 0.89 10*3/MM3 (ref 0.1–0.9)
MONOCYTES # BLD AUTO: 1.04 10*3/MM3 (ref 0.1–0.9)
MONOCYTES # BLD AUTO: 2.55 10*3/MM3 (ref 0.1–0.9)
MONOCYTES NFR BLD AUTO: 1.4 % (ref 5–12)
MONOCYTES NFR BLD AUTO: 2.2 % (ref 5–12)
MONOCYTES NFR BLD AUTO: 3.8 % (ref 5–12)
MONOCYTES NFR BLD AUTO: 3.9 % (ref 5–12)
MONOCYTES NFR BLD AUTO: 4 % (ref 5–12)
MONOCYTES NFR BLD AUTO: 4.1 % (ref 5–12)
MONOCYTES NFR BLD AUTO: 4.1 % (ref 5–12)
MONOCYTES NFR BLD AUTO: 4.2 % (ref 5–12)
MONOCYTES NFR BLD AUTO: 4.2 % (ref 5–12)
MONOCYTES NFR BLD AUTO: 5.9 % (ref 5–12)
MONOCYTES NFR BLD AUTO: 6.3 % (ref 5–12)
MONOCYTES NFR BLD AUTO: 6.5 % (ref 5–12)
MONOCYTES NFR BLD AUTO: 6.8 % (ref 5–12)
MONOCYTES NFR BLD AUTO: 7.2 % (ref 5–12)
MONOCYTES NFR BLD AUTO: 7.3 % (ref 5–12)
MONOCYTES NFR BLD AUTO: 7.7 % (ref 5–12)
MONOCYTES NFR BLD AUTO: 8.9 % (ref 5–12)
MRSA DNA SPEC QL NAA+PROBE: NEGATIVE
MYELOCYTES NFR BLD MANUAL: 2 % (ref 0–0)
NEUTROPHILS # BLD AUTO: 13.16 10*3/MM3 (ref 1.7–7)
NEUTROPHILS # BLD AUTO: 14.1 10*3/MM3 (ref 1.7–7)
NEUTROPHILS # BLD AUTO: 14.64 10*3/MM3 (ref 1.7–7)
NEUTROPHILS # BLD AUTO: 18.1 10*3/MM3 (ref 1.7–7)
NEUTROPHILS # BLD AUTO: 20.71 10*3/MM3 (ref 1.7–7)
NEUTROPHILS # BLD AUTO: 22.39 10*3/MM3 (ref 1.7–7)
NEUTROPHILS # BLD AUTO: 24.4 10*3/MM3 (ref 1.7–7)
NEUTROPHILS # BLD AUTO: 26.62 10*3/MM3 (ref 1.7–7)
NEUTROPHILS NFR BLD AUTO: 10.26 10*3/MM3 (ref 1.7–7)
NEUTROPHILS NFR BLD AUTO: 11.81 10*3/MM3 (ref 1.7–7)
NEUTROPHILS NFR BLD AUTO: 12.9 10*3/MM3 (ref 1.7–7)
NEUTROPHILS NFR BLD AUTO: 13.15 10*3/MM3 (ref 1.7–7)
NEUTROPHILS NFR BLD AUTO: 13.46 10*3/MM3 (ref 1.7–7)
NEUTROPHILS NFR BLD AUTO: 13.53 10*3/MM3 (ref 1.7–7)
NEUTROPHILS NFR BLD AUTO: 14.31 10*3/MM3 (ref 1.7–7)
NEUTROPHILS NFR BLD AUTO: 16.51 10*3/MM3 (ref 1.7–7)
NEUTROPHILS NFR BLD AUTO: 17.05 10*3/MM3 (ref 1.7–7)
NEUTROPHILS NFR BLD AUTO: 18.86 10*3/MM3 (ref 1.7–7)
NEUTROPHILS NFR BLD AUTO: 3.18 10*3/MM3 (ref 1.7–7)
NEUTROPHILS NFR BLD AUTO: 4.5 10*3/MM3 (ref 1.7–7)
NEUTROPHILS NFR BLD AUTO: 5.72 10*3/MM3 (ref 1.7–7)
NEUTROPHILS NFR BLD AUTO: 55.7 % (ref 42.7–76)
NEUTROPHILS NFR BLD AUTO: 6.45 10*3/MM3 (ref 1.7–7)
NEUTROPHILS NFR BLD AUTO: 7.05 10*3/MM3 (ref 1.7–7)
NEUTROPHILS NFR BLD AUTO: 7.73 10*3/MM3 (ref 1.7–7)
NEUTROPHILS NFR BLD AUTO: 73 % (ref 42.7–76)
NEUTROPHILS NFR BLD AUTO: 74.9 % (ref 42.7–76)
NEUTROPHILS NFR BLD AUTO: 76.1 % (ref 42.7–76)
NEUTROPHILS NFR BLD AUTO: 77.4 % (ref 42.7–76)
NEUTROPHILS NFR BLD AUTO: 83.7 % (ref 42.7–76)
NEUTROPHILS NFR BLD AUTO: 84.6 % (ref 42.7–76)
NEUTROPHILS NFR BLD AUTO: 85.5 % (ref 42.7–76)
NEUTROPHILS NFR BLD AUTO: 87.3 % (ref 42.7–76)
NEUTROPHILS NFR BLD AUTO: 88.6 % (ref 42.7–76)
NEUTROPHILS NFR BLD AUTO: 88.6 % (ref 42.7–76)
NEUTROPHILS NFR BLD AUTO: 89 % (ref 42.7–76)
NEUTROPHILS NFR BLD AUTO: 9.54 10*3/MM3 (ref 1.7–7)
NEUTROPHILS NFR BLD AUTO: 90.1 % (ref 42.7–76)
NEUTROPHILS NFR BLD AUTO: 90.7 % (ref 42.7–76)
NEUTROPHILS NFR BLD AUTO: 90.7 % (ref 42.7–76)
NEUTROPHILS NFR BLD AUTO: 91.3 % (ref 42.7–76)
NEUTROPHILS NFR BLD AUTO: 94.6 % (ref 42.7–76)
NEUTROPHILS NFR BLD MANUAL: 72 % (ref 42.7–76)
NEUTROPHILS NFR BLD MANUAL: 83 % (ref 42.7–76)
NEUTROPHILS NFR BLD MANUAL: 83 % (ref 42.7–76)
NEUTROPHILS NFR BLD MANUAL: 85 % (ref 42.7–76)
NEUTROPHILS NFR BLD MANUAL: 88 % (ref 42.7–76)
NEUTROPHILS NFR BLD MANUAL: 90 % (ref 42.7–76)
NEUTROPHILS NFR BLD MANUAL: 91.9 % (ref 42.7–76)
NEUTROPHILS NFR BLD MANUAL: 93 % (ref 42.7–76)
NEUTS BAND NFR BLD MANUAL: 1 % (ref 0–5)
NEUTS BAND NFR BLD MANUAL: 5 % (ref 0–5)
NEUTS BAND NFR BLD MANUAL: 5 % (ref 0–5)
NEUTS BAND NFR BLD MANUAL: 7 % (ref 0–5)
NEUTS BAND NFR BLD MANUAL: 7 % (ref 0–5)
NEUTS BAND NFR BLD MANUAL: 8 % (ref 0–5)
NEUTS BAND NFR BLD MANUAL: 9 % (ref 0–5)
NEUTS BAND NFR BLD MANUAL: 9 % (ref 0–5)
NITRITE UR QL STRIP: NEGATIVE
NOTE: ABNORMAL
NOTIFIED BY: ABNORMAL
NOTIFIED WHO: ABNORMAL
NRBC BLD AUTO-RTO: 0 /100 WBC (ref 0–0.2)
NRBC SPEC MANUAL: 1 /100 WBC (ref 0–0.2)
NT-PROBNP SERPL-MCNC: 1477 PG/ML (ref 0–450)
NT-PROBNP SERPL-MCNC: 827.5 PG/ML (ref 0–450)
OSMOLALITY SERPL: 400 MOSM/KG (ref 275–300)
OXYHGB MFR BLDV: 74.7 % (ref 94–99)
OXYHGB MFR BLDV: 79.4 % (ref 45–75)
OXYHGB MFR BLDV: 80.9 % (ref 45–75)
OXYHGB MFR BLDV: 82.4 % (ref 94–99)
OXYHGB MFR BLDV: 87 % (ref 94–99)
OXYHGB MFR BLDV: 87.7 % (ref 94–99)
OXYHGB MFR BLDV: 87.8 % (ref 94–99)
OXYHGB MFR BLDV: 87.9 % (ref 94–99)
OXYHGB MFR BLDV: 88.4 % (ref 94–99)
OXYHGB MFR BLDV: 88.5 % (ref 94–99)
OXYHGB MFR BLDV: 88.8 % (ref 94–99)
OXYHGB MFR BLDV: 89.4 % (ref 94–99)
OXYHGB MFR BLDV: 89.6 % (ref 94–99)
OXYHGB MFR BLDV: 90.8 % (ref 94–99)
OXYHGB MFR BLDV: 91 % (ref 94–99)
OXYHGB MFR BLDV: 91.4 % (ref 94–99)
OXYHGB MFR BLDV: 91.7 % (ref 94–99)
OXYHGB MFR BLDV: 92.2 % (ref 94–99)
OXYHGB MFR BLDV: 92.4 % (ref 94–99)
OXYHGB MFR BLDV: 93 % (ref 94–99)
OXYHGB MFR BLDV: 93.7 % (ref 94–99)
OXYHGB MFR BLDV: 94.2 % (ref 94–99)
OXYHGB MFR BLDV: 94.4 % (ref 94–99)
OXYHGB MFR BLDV: 94.7 % (ref 94–99)
OXYHGB MFR BLDV: 95.4 % (ref 94–99)
OXYHGB MFR BLDV: 96.4 % (ref 94–99)
OXYHGB MFR BLDV: 96.5 % (ref 94–99)
OXYHGB MFR BLDV: 96.6 % (ref 94–99)
OXYHGB MFR BLDV: 96.9 % (ref 94–99)
OXYHGB MFR BLDV: 97 % (ref 94–99)
OXYHGB MFR BLDV: 97.2 % (ref 94–99)
OXYHGB MFR BLDV: 97.2 % (ref 94–99)
OXYHGB MFR BLDV: 97.6 % (ref 94–99)
OXYHGB MFR BLDV: >98.5 % (ref 94–99)
P-ANCA ATYPICAL TITR SER IF: NORMAL TITER
P-ANCA TITR SER IF: NORMAL TITER
PAW @ PEAK INSP FLOW SETTING VENT: 15 CMH2O
PAW @ PEAK INSP FLOW SETTING VENT: 18 CMH2O
PAW @ PEAK INSP FLOW SETTING VENT: 20 CMH2O
PCO2 BLDA: 23 MM HG (ref 35–45)
PCO2 BLDA: 23.9 MM HG (ref 35–45)
PCO2 BLDA: 23.9 MM HG (ref 35–45)
PCO2 BLDA: 24.2 MM HG (ref 35–45)
PCO2 BLDA: 25 MM HG (ref 35–45)
PCO2 BLDA: 25.7 MM HG (ref 35–45)
PCO2 BLDA: 26.3 MM HG (ref 35–45)
PCO2 BLDA: 27.1 MM HG (ref 35–45)
PCO2 BLDA: 27.4 MM HG (ref 35–45)
PCO2 BLDA: 27.6 MM HG (ref 35–45)
PCO2 BLDA: 29.3 MM HG (ref 35–45)
PCO2 BLDA: 29.5 MM HG (ref 35–45)
PCO2 BLDA: 29.9 MM HG (ref 35–45)
PCO2 BLDA: 33.5 MM HG (ref 35–45)
PCO2 BLDA: 34.9 MM HG (ref 35–45)
PCO2 BLDA: 35.4 MM HG (ref 35–45)
PCO2 BLDA: 36.9 MM HG (ref 35–45)
PCO2 BLDA: 39.4 MM HG (ref 35–45)
PCO2 BLDA: 39.5 MM HG (ref 35–45)
PCO2 BLDA: 39.9 MM HG (ref 35–45)
PCO2 BLDA: 40.1 MM HG (ref 35–45)
PCO2 BLDA: 40.2 MM HG (ref 35–45)
PCO2 BLDA: 40.7 MM HG (ref 35–45)
PCO2 BLDA: 43.2 MM HG (ref 35–45)
PCO2 BLDA: 44.9 MM HG (ref 35–45)
PCO2 BLDA: 50.7 MM HG (ref 35–45)
PCO2 BLDA: 50.8 MM HG (ref 35–45)
PCO2 BLDA: 53 MM HG (ref 35–45)
PCO2 BLDA: 53.5 MM HG (ref 35–45)
PCO2 BLDA: 62.1 MM HG (ref 35–45)
PCO2 BLDA: 81.7 MM HG (ref 35–45)
PCO2 BLDA: >104 MM HG (ref 35–45)
PCO2 BLDV: 33.1 MM HG (ref 41–51)
PCO2 BLDV: 46.1 MM HG (ref 41–51)
PCO2 TEMP ADJ BLD: ABNORMAL MM[HG]
PEEP RESPIRATORY: 10 CM[H2O]
PEEP RESPIRATORY: 10 CM[H2O]
PEEP RESPIRATORY: 12 CM[H2O]
PEEP RESPIRATORY: 14 CM[H2O]
PEEP RESPIRATORY: 16 CM[H2O]
PEEP RESPIRATORY: 5 CM[H2O]
PEEP RESPIRATORY: 5 CM[H2O]
PEEP RESPIRATORY: 8 CM[H2O]
PH BLDA: 6.88 PH UNITS (ref 7.35–7.45)
PH BLDA: 6.96 PH UNITS (ref 7.35–7.45)
PH BLDA: 7.04 PH UNITS (ref 7.35–7.45)
PH BLDA: 7.14 PH UNITS (ref 7.35–7.45)
PH BLDA: 7.14 PH UNITS (ref 7.35–7.45)
PH BLDA: 7.18 PH UNITS (ref 7.35–7.45)
PH BLDA: 7.22 PH UNITS (ref 7.35–7.45)
PH BLDA: 7.26 PH UNITS (ref 7.35–7.45)
PH BLDA: 7.29 PH UNITS (ref 7.35–7.45)
PH BLDA: 7.3 PH UNITS (ref 7.35–7.45)
PH BLDA: 7.3 PH UNITS (ref 7.35–7.45)
PH BLDA: 7.31 PH UNITS (ref 7.35–7.45)
PH BLDA: 7.32 PH UNITS (ref 7.35–7.45)
PH BLDA: 7.33 PH UNITS (ref 7.35–7.45)
PH BLDA: 7.33 PH UNITS (ref 7.35–7.45)
PH BLDA: 7.35 PH UNITS (ref 7.35–7.45)
PH BLDA: 7.36 PH UNITS (ref 7.35–7.45)
PH BLDA: 7.37 PH UNITS (ref 7.35–7.45)
PH BLDA: 7.37 PH UNITS (ref 7.35–7.45)
PH BLDA: 7.39 PH UNITS (ref 7.35–7.45)
PH BLDA: 7.4 PH UNITS (ref 7.35–7.45)
PH BLDA: 7.4 PH UNITS (ref 7.35–7.45)
PH BLDA: 7.41 PH UNITS (ref 7.35–7.45)
PH BLDA: 7.41 PH UNITS (ref 7.35–7.45)
PH BLDA: 7.42 PH UNITS (ref 7.35–7.45)
PH BLDA: 7.42 PH UNITS (ref 7.35–7.45)
PH BLDA: 7.43 PH UNITS (ref 7.35–7.45)
PH BLDA: 7.43 PH UNITS (ref 7.35–7.45)
PH BLDA: 7.44 PH UNITS (ref 7.35–7.45)
PH BLDA: 7.44 PH UNITS (ref 7.35–7.45)
PH BLDV: 7.23 PH UNITS (ref 7.32–7.42)
PH BLDV: 7.31 PH UNITS (ref 7.32–7.42)
PH UR STRIP.AUTO: 5.5 [PH] (ref 5–8)
PH, TEMP CORRECTED: ABNORMAL
PHOSPHATE SERPL-MCNC: 1 MG/DL (ref 2.5–4.5)
PHOSPHATE SERPL-MCNC: 1.1 MG/DL (ref 2.5–4.5)
PHOSPHATE SERPL-MCNC: 1.5 MG/DL (ref 2.5–4.5)
PHOSPHATE SERPL-MCNC: 1.8 MG/DL (ref 2.5–4.5)
PHOSPHATE SERPL-MCNC: 12.7 MG/DL (ref 2.5–4.5)
PHOSPHATE SERPL-MCNC: 2 MG/DL (ref 2.5–4.5)
PHOSPHATE SERPL-MCNC: 2.2 MG/DL (ref 2.5–4.5)
PHOSPHATE SERPL-MCNC: 2.4 MG/DL (ref 2.5–4.5)
PHOSPHATE SERPL-MCNC: 2.5 MG/DL (ref 2.5–4.5)
PHOSPHATE SERPL-MCNC: 2.6 MG/DL (ref 2.5–4.5)
PHOSPHATE SERPL-MCNC: 2.7 MG/DL (ref 2.5–4.5)
PHOSPHATE SERPL-MCNC: 5.9 MG/DL (ref 2.5–4.5)
PHOSPHATE SERPL-MCNC: 7.7 MG/DL (ref 2.5–4.5)
PLAT MORPH BLD: NORMAL
PLATELET # BLD AUTO: 110 10*3/MM3 (ref 140–450)
PLATELET # BLD AUTO: 114 10*3/MM3 (ref 140–450)
PLATELET # BLD AUTO: 119 10*3/MM3 (ref 140–450)
PLATELET # BLD AUTO: 120 10*3/MM3 (ref 140–450)
PLATELET # BLD AUTO: 120 10*3/MM3 (ref 140–450)
PLATELET # BLD AUTO: 122 10*3/MM3 (ref 140–450)
PLATELET # BLD AUTO: 123 10*3/MM3 (ref 140–450)
PLATELET # BLD AUTO: 125 10*3/MM3 (ref 140–450)
PLATELET # BLD AUTO: 130 10*3/MM3 (ref 140–450)
PLATELET # BLD AUTO: 130 10*3/MM3 (ref 140–450)
PLATELET # BLD AUTO: 137 10*3/MM3 (ref 140–450)
PLATELET # BLD AUTO: 139 10*3/MM3 (ref 140–450)
PLATELET # BLD AUTO: 141 10*3/MM3 (ref 140–450)
PLATELET # BLD AUTO: 143 10*3/MM3 (ref 140–450)
PLATELET # BLD AUTO: 145 10*3/MM3 (ref 140–450)
PLATELET # BLD AUTO: 146 10*3/MM3 (ref 140–450)
PLATELET # BLD AUTO: 156 10*3/MM3 (ref 140–450)
PLATELET # BLD AUTO: 157 10*3/MM3 (ref 140–450)
PLATELET # BLD AUTO: 158 10*3/MM3 (ref 140–450)
PLATELET # BLD AUTO: 160 10*3/MM3 (ref 140–450)
PLATELET # BLD AUTO: 165 10*3/MM3 (ref 140–450)
PLATELET # BLD AUTO: 216 10*3/MM3 (ref 140–450)
PLATELET # BLD AUTO: 229 10*3/MM3 (ref 140–450)
PLATELET # BLD AUTO: 338 10*3/MM3 (ref 140–450)
PLATELET # BLD AUTO: 515 10*3/MM3 (ref 140–450)
PMV BLD AUTO: 10 FL (ref 6–12)
PMV BLD AUTO: 10.1 FL (ref 6–12)
PMV BLD AUTO: 10.4 FL (ref 6–12)
PMV BLD AUTO: 10.5 FL (ref 6–12)
PMV BLD AUTO: 10.6 FL (ref 6–12)
PMV BLD AUTO: 10.9 FL (ref 6–12)
PMV BLD AUTO: 11.2 FL (ref 6–12)
PMV BLD AUTO: 11.3 FL (ref 6–12)
PMV BLD AUTO: 11.5 FL (ref 6–12)
PMV BLD AUTO: 11.6 FL (ref 6–12)
PMV BLD AUTO: 11.8 FL (ref 6–12)
PMV BLD AUTO: 11.8 FL (ref 6–12)
PMV BLD AUTO: 12.3 FL (ref 6–12)
PMV BLD AUTO: 12.4 FL (ref 6–12)
PMV BLD AUTO: 12.4 FL (ref 6–12)
PO2 BLDA: 101 MM HG (ref 83–108)
PO2 BLDA: 102 MM HG (ref 83–108)
PO2 BLDA: 105 MM HG (ref 83–108)
PO2 BLDA: 116 MM HG (ref 83–108)
PO2 BLDA: 140 MM HG (ref 83–108)
PO2 BLDA: 189 MM HG (ref 83–108)
PO2 BLDA: 46.8 MM HG (ref 83–108)
PO2 BLDA: 52 MM HG (ref 83–108)
PO2 BLDA: 53.1 MM HG (ref 83–108)
PO2 BLDA: 53.2 MM HG (ref 83–108)
PO2 BLDA: 54.7 MM HG (ref 83–108)
PO2 BLDA: 58 MM HG (ref 83–108)
PO2 BLDA: 58.3 MM HG (ref 83–108)
PO2 BLDA: 58.5 MM HG (ref 83–108)
PO2 BLDA: 59.9 MM HG (ref 83–108)
PO2 BLDA: 60.4 MM HG (ref 83–108)
PO2 BLDA: 62.5 MM HG (ref 83–108)
PO2 BLDA: 64.4 MM HG (ref 83–108)
PO2 BLDA: 66.1 MM HG (ref 83–108)
PO2 BLDA: 69.8 MM HG (ref 83–108)
PO2 BLDA: 72.3 MM HG (ref 83–108)
PO2 BLDA: 73 MM HG (ref 83–108)
PO2 BLDA: 76.2 MM HG (ref 83–108)
PO2 BLDA: 77.9 MM HG (ref 83–108)
PO2 BLDA: 77.9 MM HG (ref 83–108)
PO2 BLDA: 81.1 MM HG (ref 83–108)
PO2 BLDA: 84.2 MM HG (ref 83–108)
PO2 BLDA: 85.3 MM HG (ref 83–108)
PO2 BLDA: 85.6 MM HG (ref 83–108)
PO2 BLDA: 89.8 MM HG (ref 83–108)
PO2 BLDA: 97.6 MM HG (ref 83–108)
PO2 BLDA: 98.8 MM HG (ref 83–108)
PO2 BLDV: 47.8 MM HG (ref 27–53)
PO2 BLDV: 56.8 MM HG (ref 27–53)
PO2 TEMP ADJ BLD: ABNORMAL MM[HG]
POIKILOCYTOSIS BLD QL SMEAR: ABNORMAL
POTASSIUM SERPL-SCNC: 3.4 MMOL/L (ref 3.5–5.2)
POTASSIUM SERPL-SCNC: 3.5 MMOL/L (ref 3.5–5.2)
POTASSIUM SERPL-SCNC: 3.6 MMOL/L (ref 3.5–5.2)
POTASSIUM SERPL-SCNC: 3.7 MMOL/L (ref 3.5–5.2)
POTASSIUM SERPL-SCNC: 3.8 MMOL/L (ref 3.5–5.2)
POTASSIUM SERPL-SCNC: 3.8 MMOL/L (ref 3.5–5.2)
POTASSIUM SERPL-SCNC: 3.9 MMOL/L (ref 3.5–5.2)
POTASSIUM SERPL-SCNC: 4 MMOL/L (ref 3.5–5.2)
POTASSIUM SERPL-SCNC: 4.1 MMOL/L (ref 3.5–5.2)
POTASSIUM SERPL-SCNC: 4.3 MMOL/L (ref 3.5–5.2)
POTASSIUM SERPL-SCNC: 4.3 MMOL/L (ref 3.5–5.2)
POTASSIUM SERPL-SCNC: 4.4 MMOL/L (ref 3.5–5.2)
POTASSIUM SERPL-SCNC: 4.5 MMOL/L (ref 3.5–5.2)
POTASSIUM SERPL-SCNC: 4.6 MMOL/L (ref 3.5–5.2)
POTASSIUM SERPL-SCNC: 4.7 MMOL/L (ref 3.5–5.2)
POTASSIUM SERPL-SCNC: 4.8 MMOL/L (ref 3.5–5.2)
POTASSIUM SERPL-SCNC: 4.8 MMOL/L (ref 3.5–5.2)
POTASSIUM SERPL-SCNC: 4.9 MMOL/L (ref 3.5–5.2)
POTASSIUM SERPL-SCNC: 5 MMOL/L (ref 3.5–5.2)
POTASSIUM SERPL-SCNC: 5.1 MMOL/L (ref 3.5–5.2)
POTASSIUM SERPL-SCNC: 5.2 MMOL/L (ref 3.5–5.2)
POTASSIUM SERPL-SCNC: 5.5 MMOL/L (ref 3.5–5.2)
PROCALCITONIN SERPL-MCNC: 0.35 NG/ML (ref 0–0.25)
PROCALCITONIN SERPL-MCNC: 0.47 NG/ML (ref 0–0.25)
PROCALCITONIN SERPL-MCNC: 0.85 NG/ML (ref 0–0.25)
PROCALCITONIN SERPL-MCNC: 0.93 NG/ML (ref 0–0.25)
PROCALCITONIN SERPL-MCNC: 1.22 NG/ML (ref 0–0.25)
PROCALCITONIN SERPL-MCNC: 1.37 NG/ML (ref 0–0.25)
PROCALCITONIN SERPL-MCNC: 1.37 NG/ML (ref 0–0.25)
PROCALCITONIN SERPL-MCNC: 1.4 NG/ML (ref 0–0.25)
PROCALCITONIN SERPL-MCNC: 1.61 NG/ML (ref 0–0.25)
PROCALCITONIN SERPL-MCNC: 1.88 NG/ML (ref 0–0.25)
PROCALCITONIN SERPL-MCNC: 2.02 NG/ML (ref 0–0.25)
PROCALCITONIN SERPL-MCNC: 2.26 NG/ML (ref 0–0.25)
PROT SERPL-MCNC: 4.1 G/DL (ref 6–8.5)
PROT SERPL-MCNC: 4.2 G/DL (ref 6–8.5)
PROT SERPL-MCNC: 4.4 G/DL (ref 6–8.5)
PROT SERPL-MCNC: 4.5 G/DL (ref 6–8.5)
PROT SERPL-MCNC: 4.9 G/DL (ref 6–8.5)
PROT SERPL-MCNC: 5.1 G/DL (ref 6–8.5)
PROT SERPL-MCNC: 5.2 G/DL (ref 6–8.5)
PROT SERPL-MCNC: 5.3 G/DL (ref 6–8.5)
PROT SERPL-MCNC: 5.5 G/DL (ref 6–8.5)
PROT SERPL-MCNC: 5.6 G/DL (ref 6–8.5)
PROT SERPL-MCNC: 5.7 G/DL (ref 6–8.5)
PROT SERPL-MCNC: 5.8 G/DL (ref 6–8.5)
PROT SERPL-MCNC: 5.9 G/DL (ref 6–8.5)
PROT SERPL-MCNC: 5.9 G/DL (ref 6–8.5)
PROT SERPL-MCNC: 6 G/DL (ref 6–8.5)
PROT SERPL-MCNC: 6 G/DL (ref 6–8.5)
PROT SERPL-MCNC: 6.1 G/DL (ref 6–8.5)
PROT SERPL-MCNC: 6.2 G/DL (ref 6–8.5)
PROT SERPL-MCNC: 6.3 G/DL (ref 6–8.5)
PROT SERPL-MCNC: 6.4 G/DL (ref 6–8.5)
PROT SERPL-MCNC: 7.1 G/DL (ref 6–8.5)
PROT SERPL-MCNC: 7.8 G/DL (ref 6–8.5)
PROT UR QL STRIP: ABNORMAL
PROTHROMBIN TIME: 12.2 SECONDS (ref 12.8–14.5)
PROTHROMBIN TIME: 12.7 SECONDS (ref 12.8–14.5)
PROTHROMBIN TIME: 13.8 SECONDS (ref 12.8–14.5)
PSV: 15 CMH2O
PSV: 15 CMH2O
PSV: ABNORMAL
QT INTERVAL: 348 MS
QT INTERVAL: 392 MS
QT INTERVAL: 428 MS
QT INTERVAL: 434 MS
QTC INTERVAL: 464 MS
QTC INTERVAL: 465 MS
QTC INTERVAL: 500 MS
QTC INTERVAL: 518 MS
QUANTIFERON INCUBATION: ABNORMAL
RBC # BLD AUTO: 2.58 10*6/MM3 (ref 4.14–5.8)
RBC # BLD AUTO: 2.7 10*6/MM3 (ref 4.14–5.8)
RBC # BLD AUTO: 2.74 10*6/MM3 (ref 4.14–5.8)
RBC # BLD AUTO: 2.74 10*6/MM3 (ref 4.14–5.8)
RBC # BLD AUTO: 2.76 10*6/MM3 (ref 4.14–5.8)
RBC # BLD AUTO: 2.77 10*6/MM3 (ref 4.14–5.8)
RBC # BLD AUTO: 2.9 10*6/MM3 (ref 4.14–5.8)
RBC # BLD AUTO: 2.9 10*6/MM3 (ref 4.14–5.8)
RBC # BLD AUTO: 2.97 10*6/MM3 (ref 4.14–5.8)
RBC # BLD AUTO: 2.98 10*6/MM3 (ref 4.14–5.8)
RBC # BLD AUTO: 3 10*6/MM3 (ref 4.14–5.8)
RBC # BLD AUTO: 3.09 10*6/MM3 (ref 4.14–5.8)
RBC # BLD AUTO: 3.15 10*6/MM3 (ref 4.14–5.8)
RBC # BLD AUTO: 3.25 10*6/MM3 (ref 4.14–5.8)
RBC # BLD AUTO: 3.28 10*6/MM3 (ref 4.14–5.8)
RBC # BLD AUTO: 3.47 10*6/MM3 (ref 4.14–5.8)
RBC # BLD AUTO: 3.48 10*6/MM3 (ref 4.14–5.8)
RBC # BLD AUTO: 3.84 10*6/MM3 (ref 4.14–5.8)
RBC # BLD AUTO: 3.86 10*6/MM3 (ref 4.14–5.8)
RBC # BLD AUTO: 3.87 10*6/MM3 (ref 4.14–5.8)
RBC # BLD AUTO: 4.2 10*6/MM3 (ref 4.14–5.8)
RBC # BLD AUTO: 4.89 10*6/MM3 (ref 4.14–5.8)
RBC # BLD AUTO: 4.93 10*6/MM3 (ref 4.14–5.8)
RBC # BLD AUTO: 5.03 10*6/MM3 (ref 4.14–5.8)
RBC # BLD AUTO: 5.75 10*6/MM3 (ref 4.14–5.8)
RBC # UR: ABNORMAL /HPF
RBC MORPH BLD: NORMAL
REF LAB TEST METHOD: ABNORMAL
RH BLD: POSITIVE
RHINOVIRUS RNA SPEC NAA+PROBE: NOT DETECTED
RSV RNA NPH QL NAA+NON-PROBE: DETECTED
S AUREUS DNA SPEC QL NAA+PROBE: NEGATIVE
S PNEUM AG SPEC QL LA: NEGATIVE
SAO2 % BLDCOA: 75.5 % (ref 94–99)
SAO2 % BLDCOA: 83 % (ref 94–99)
SAO2 % BLDCOA: 88.5 % (ref 94–99)
SAO2 % BLDCOA: 88.8 % (ref 94–99)
SAO2 % BLDCOA: 88.8 % (ref 94–99)
SAO2 % BLDCOA: 88.9 % (ref 94–99)
SAO2 % BLDCOA: 89.1 % (ref 94–99)
SAO2 % BLDCOA: 90.1 % (ref 94–99)
SAO2 % BLDCOA: 90.1 % (ref 94–99)
SAO2 % BLDCOA: 90.5 % (ref 94–99)
SAO2 % BLDCOA: 90.6 % (ref 94–99)
SAO2 % BLDCOA: 91.6 % (ref 94–99)
SAO2 % BLDCOA: 92.3 % (ref 94–99)
SAO2 % BLDCOA: 92.5 % (ref 94–99)
SAO2 % BLDCOA: 92.9 % (ref 94–99)
SAO2 % BLDCOA: 93.2 % (ref 94–99)
SAO2 % BLDCOA: 93.5 % (ref 94–99)
SAO2 % BLDCOA: 94.8 % (ref 94–99)
SAO2 % BLDCOA: 95.1 % (ref 94–99)
SAO2 % BLDCOA: 96 % (ref 94–99)
SAO2 % BLDCOA: 96 % (ref 94–99)
SAO2 % BLDCOA: 96.2 % (ref 94–99)
SAO2 % BLDCOA: 96.2 % (ref 94–99)
SAO2 % BLDCOA: 96.7 % (ref 94–99)
SAO2 % BLDCOA: 97.6 % (ref 94–99)
SAO2 % BLDCOA: 97.7 % (ref 94–99)
SAO2 % BLDCOA: 98 % (ref 94–99)
SAO2 % BLDCOA: 98 % (ref 94–99)
SAO2 % BLDCOA: 98.3 % (ref 94–99)
SAO2 % BLDCOA: 98.4 % (ref 94–99)
SAO2 % BLDCOA: 98.8 % (ref 94–99)
SAO2 % BLDCOA: 99.1 % (ref 94–99)
SAO2 % BLDCOV: 80.5 % (ref 45–75)
SAO2 % BLDCOV: 81.9 % (ref 45–75)
SARS-COV-2 RNA RESP QL NAA+PROBE: DETECTED
SCAN SLIDE: NORMAL
SERVICE CMNT-IMP: ABNORMAL
SET MECH RESP RATE: 14
SET MECH RESP RATE: 14
SET MECH RESP RATE: 16
SET MECH RESP RATE: 18
SET MECH RESP RATE: 18
SET MECH RESP RATE: 22
SET MECH RESP RATE: 22
SET MECH RESP RATE: 24
SET MECH RESP RATE: 28
SET MECH RESP RATE: 29
SET MECH RESP RATE: 30
SET MECH RESP RATE: 30
SET MECH RESP RATE: 32
SMALL PLATELETS BLD QL SMEAR: ABNORMAL
SMALL PLATELETS BLD QL SMEAR: ABNORMAL
SMALL PLATELETS BLD QL SMEAR: NORMAL
SODIUM SERPL-SCNC: 122 MMOL/L (ref 136–145)
SODIUM SERPL-SCNC: 125 MMOL/L (ref 136–145)
SODIUM SERPL-SCNC: 127 MMOL/L (ref 136–145)
SODIUM SERPL-SCNC: 128 MMOL/L (ref 136–145)
SODIUM SERPL-SCNC: 132 MMOL/L (ref 136–145)
SODIUM SERPL-SCNC: 133 MMOL/L (ref 136–145)
SODIUM SERPL-SCNC: 133 MMOL/L (ref 136–145)
SODIUM SERPL-SCNC: 134 MMOL/L (ref 136–145)
SODIUM SERPL-SCNC: 135 MMOL/L (ref 136–145)
SODIUM SERPL-SCNC: 136 MMOL/L (ref 136–145)
SODIUM SERPL-SCNC: 138 MMOL/L (ref 136–145)
SODIUM SERPL-SCNC: 141 MMOL/L (ref 136–145)
SODIUM SERPL-SCNC: 141 MMOL/L (ref 136–145)
SODIUM SERPL-SCNC: 142 MMOL/L (ref 136–145)
SODIUM SERPL-SCNC: 143 MMOL/L (ref 136–145)
SODIUM SERPL-SCNC: 145 MMOL/L (ref 136–145)
SODIUM SERPL-SCNC: 146 MMOL/L (ref 136–145)
SODIUM SERPL-SCNC: 147 MMOL/L (ref 136–145)
SODIUM SERPL-SCNC: 147 MMOL/L (ref 136–145)
SODIUM SERPL-SCNC: 148 MMOL/L (ref 136–145)
SODIUM SERPL-SCNC: 149 MMOL/L (ref 136–145)
SODIUM SERPL-SCNC: 149 MMOL/L (ref 136–145)
SODIUM SERPL-SCNC: 150 MMOL/L (ref 136–145)
SODIUM SERPL-SCNC: 151 MMOL/L (ref 136–145)
SODIUM SERPL-SCNC: 152 MMOL/L (ref 136–145)
SP GR UR STRIP: >1.03 (ref 1–1.03)
SQUAMOUS #/AREA URNS HPF: ABNORMAL /HPF
STRESS TARGET HR: 145 BPM
STRESS TARGET HR: 145 BPM
T&S EXPIRATION DATE: NORMAL
T4 FREE SERPL-MCNC: 1.08 NG/DL (ref 0.93–1.7)
TOXIC GRANULATION: ABNORMAL
TOXIC GRANULATION: ABNORMAL
TRIGL SERPL-MCNC: 136 MG/DL (ref 0–150)
TRIGL SERPL-MCNC: 315 MG/DL (ref 0–150)
TRIGL SERPL-MCNC: 373 MG/DL (ref 0–150)
TROPONIN T SERPL-MCNC: 0.01 NG/ML (ref 0–0.03)
TROPONIN T SERPL-MCNC: 0.04 NG/ML (ref 0–0.03)
TROPONIN T SERPL-MCNC: 0.06 NG/ML (ref 0–0.03)
TROPONIN T SERPL-MCNC: <0.01 NG/ML (ref 0–0.03)
TROPONIN T SERPL-MCNC: <0.01 NG/ML (ref 0–0.03)
TSH SERPL DL<=0.05 MIU/L-ACNC: 0.76 UIU/ML (ref 0.27–4.2)
UROBILINOGEN UR QL STRIP: ABNORMAL
VANCOMYCIN SERPL-MCNC: 20 MCG/ML (ref 5–40)
VANCOMYCIN SERPL-MCNC: 20.5 MCG/ML (ref 5–40)
VANCOMYCIN SERPL-MCNC: 21.6 MCG/ML (ref 5–40)
VANCOMYCIN SERPL-MCNC: 23.8 MCG/ML (ref 5–40)
VANCOMYCIN SERPL-MCNC: 24.7 MCG/ML (ref 5–40)
VANCOMYCIN SERPL-MCNC: 25.2 MCG/ML (ref 5–40)
VANCOMYCIN SERPL-MCNC: 26.4 MCG/ML (ref 5–40)
VANCOMYCIN TROUGH SERPL-MCNC: 17.1 MCG/ML (ref 5–20)
VANCOMYCIN TROUGH SERPL-MCNC: 26.2 MCG/ML (ref 5–20)
VENTILATOR MODE: ABNORMAL
VLDLC SERPL-MCNC: 24 MG/DL (ref 5–40)
VT ON VENT VENT: 430 ML
VT ON VENT VENT: 440 ML
VT ON VENT VENT: 460 ML
VT ON VENT VENT: 460 ML
VT ON VENT VENT: 500 ML
WBC # BLD AUTO: 10.58 10*3/MM3 (ref 3.4–10.8)
WBC # BLD AUTO: 12 10*3/MM3 (ref 3.4–10.8)
WBC # BLD AUTO: 13.52 10*3/MM3 (ref 3.4–10.8)
WBC # BLD AUTO: 14.16 10*3/MM3 (ref 3.4–10.8)
WBC # BLD AUTO: 14.5 10*3/MM3 (ref 3.4–10.8)
WBC # BLD AUTO: 14.56 10*3/MM3 (ref 3.4–10.8)
WBC # BLD AUTO: 15.2 10*3/MM3 (ref 3.4–10.8)
WBC # BLD AUTO: 15.49 10*3/MM3 (ref 3.4–10.8)
WBC # BLD AUTO: 15.91 10*3/MM3 (ref 3.4–10.8)
WBC # BLD AUTO: 16.08 10*3/MM3 (ref 3.4–10.8)
WBC # BLD AUTO: 16.16 10*3/MM3 (ref 3.4–10.8)
WBC # BLD AUTO: 18.06 10*3/MM3 (ref 3.4–10.8)
WBC # BLD AUTO: 18.47 10*3/MM3 (ref 3.4–10.8)
WBC # BLD AUTO: 18.8 10*3/MM3 (ref 3.4–10.8)
WBC # BLD AUTO: 19.92 10*3/MM3 (ref 3.4–10.8)
WBC # BLD AUTO: 22.03 10*3/MM3 (ref 3.4–10.8)
WBC # BLD AUTO: 25.68 10*3/MM3 (ref 3.4–10.8)
WBC # BLD AUTO: 28.02 10*3/MM3 (ref 3.4–10.8)
WBC # BLD AUTO: 28.34 10*3/MM3 (ref 3.4–10.8)
WBC # BLD AUTO: 5.7 10*3/MM3 (ref 3.4–10.8)
WBC # BLD AUTO: 5.91 10*3/MM3 (ref 3.4–10.8)
WBC # BLD AUTO: 7.64 10*3/MM3 (ref 3.4–10.8)
WBC # BLD AUTO: 8.83 10*3/MM3 (ref 3.4–10.8)
WBC # BLD AUTO: 9.11 10*3/MM3 (ref 3.4–10.8)
WBC # BLD AUTO: 9.13 10*3/MM3 (ref 3.4–10.8)
WBC UR QL AUTO: ABNORMAL /HPF
WHOLE BLOOD HOLD SPECIMEN: NORMAL

## 2021-01-01 PROCEDURE — 85007 BL SMEAR W/DIFF WBC COUNT: CPT | Performed by: EMERGENCY MEDICINE

## 2021-01-01 PROCEDURE — 82962 GLUCOSE BLOOD TEST: CPT

## 2021-01-01 PROCEDURE — 93005 ELECTROCARDIOGRAM TRACING: CPT | Performed by: STUDENT IN AN ORGANIZED HEALTH CARE EDUCATION/TRAINING PROGRAM

## 2021-01-01 PROCEDURE — 84145 PROCALCITONIN (PCT): CPT | Performed by: HOSPITALIST

## 2021-01-01 PROCEDURE — 93321 DOPPLER ECHO F-UP/LMTD STD: CPT | Performed by: INTERNAL MEDICINE

## 2021-01-01 PROCEDURE — 82310 ASSAY OF CALCIUM: CPT | Performed by: HOSPITALIST

## 2021-01-01 PROCEDURE — 82565 ASSAY OF CREATININE: CPT

## 2021-01-01 PROCEDURE — 85379 FIBRIN DEGRADATION QUANT: CPT | Performed by: HOSPITALIST

## 2021-01-01 PROCEDURE — 99284 EMERGENCY DEPT VISIT MOD MDM: CPT

## 2021-01-01 PROCEDURE — 25010000002 DEXAMETHASONE PER 1 MG: Performed by: INTERNAL MEDICINE

## 2021-01-01 PROCEDURE — 82805 BLOOD GASES W/O2 SATURATION: CPT

## 2021-01-01 PROCEDURE — 85730 THROMBOPLASTIN TIME PARTIAL: CPT | Performed by: INTERNAL MEDICINE

## 2021-01-01 PROCEDURE — 83615 LACTATE (LD) (LDH) ENZYME: CPT | Performed by: HOSPITALIST

## 2021-01-01 PROCEDURE — 94799 UNLISTED PULMONARY SVC/PX: CPT

## 2021-01-01 PROCEDURE — G0432 EIA HIV-1/HIV-2 SCREEN: HCPCS | Performed by: NURSE PRACTITIONER

## 2021-01-01 PROCEDURE — 94003 VENT MGMT INPAT SUBQ DAY: CPT

## 2021-01-01 PROCEDURE — 25010000002 CEFTRIAXONE PER 250 MG: Performed by: INTERNAL MEDICINE

## 2021-01-01 PROCEDURE — 36600 WITHDRAWAL OF ARTERIAL BLOOD: CPT

## 2021-01-01 PROCEDURE — 25010000002 PROPOFOL 10 MG/ML EMULSION: Performed by: INTERNAL MEDICINE

## 2021-01-01 PROCEDURE — XW033E5 INTRODUCTION OF REMDESIVIR ANTI-INFECTIVE INTO PERIPHERAL VEIN, PERCUTANEOUS APPROACH, NEW TECHNOLOGY GROUP 5: ICD-10-PCS | Performed by: INTERNAL MEDICINE

## 2021-01-01 PROCEDURE — 25010000002 MICAFUNGIN SODIUM 100 MG RECONSTITUTED SOLUTION 1 EACH VIAL: Performed by: PHYSICIAN ASSISTANT

## 2021-01-01 PROCEDURE — 25010000002 HEPARIN (PORCINE) PER 1000 UNITS: Performed by: INTERNAL MEDICINE

## 2021-01-01 PROCEDURE — 86431 RHEUMATOID FACTOR QUANT: CPT | Performed by: INTERNAL MEDICINE

## 2021-01-01 PROCEDURE — 86140 C-REACTIVE PROTEIN: CPT | Performed by: HOSPITALIST

## 2021-01-01 PROCEDURE — 78580 LUNG PERFUSION IMAGING: CPT | Performed by: RADIOLOGY

## 2021-01-01 PROCEDURE — 83735 ASSAY OF MAGNESIUM: CPT | Performed by: STUDENT IN AN ORGANIZED HEALTH CARE EDUCATION/TRAINING PROGRAM

## 2021-01-01 PROCEDURE — 99291 CRITICAL CARE FIRST HOUR: CPT | Performed by: INTERNAL MEDICINE

## 2021-01-01 PROCEDURE — 0042T HC CT CEREBRAL PERFUSION W/WO CONTRAST: CPT

## 2021-01-01 PROCEDURE — 86235 NUCLEAR ANTIGEN ANTIBODY: CPT | Performed by: INTERNAL MEDICINE

## 2021-01-01 PROCEDURE — 80053 COMPREHEN METABOLIC PANEL: CPT | Performed by: HOSPITALIST

## 2021-01-01 PROCEDURE — 63710000001 INSULIN DETEMIR PER 5 UNITS: Performed by: PHYSICIAN ASSISTANT

## 2021-01-01 PROCEDURE — 82040 ASSAY OF SERUM ALBUMIN: CPT | Performed by: INTERNAL MEDICINE

## 2021-01-01 PROCEDURE — 71045 X-RAY EXAM CHEST 1 VIEW: CPT

## 2021-01-01 PROCEDURE — 93010 ELECTROCARDIOGRAM REPORT: CPT | Performed by: INTERNAL MEDICINE

## 2021-01-01 PROCEDURE — 71250 CT THORAX DX C-: CPT | Performed by: RADIOLOGY

## 2021-01-01 PROCEDURE — 83050 HGB METHEMOGLOBIN QUAN: CPT

## 2021-01-01 PROCEDURE — 82375 ASSAY CARBOXYHB QUANT: CPT

## 2021-01-01 PROCEDURE — 25010000002 PIPERACILLIN SOD-TAZOBACTAM PER 1 G: Performed by: STUDENT IN AN ORGANIZED HEALTH CARE EDUCATION/TRAINING PROGRAM

## 2021-01-01 PROCEDURE — 25010000002 FENTANYL CITRATE (PF) 1000 MCG/20ML SOLUTION: Performed by: INTERNAL MEDICINE

## 2021-01-01 PROCEDURE — 63710000001 INSULIN ASPART PER 5 UNITS: Performed by: INTERNAL MEDICINE

## 2021-01-01 PROCEDURE — 74176 CT ABD & PELVIS W/O CONTRAST: CPT

## 2021-01-01 PROCEDURE — 93971 EXTREMITY STUDY: CPT

## 2021-01-01 PROCEDURE — 83880 ASSAY OF NATRIURETIC PEPTIDE: CPT | Performed by: STUDENT IN AN ORGANIZED HEALTH CARE EDUCATION/TRAINING PROGRAM

## 2021-01-01 PROCEDURE — 25010000002 MICAFUNGIN SODIUM 100 MG RECONSTITUTED SOLUTION 1 EACH VIAL: Performed by: INTERNAL MEDICINE

## 2021-01-01 PROCEDURE — 25010000002 MEROPENEM PER 100 MG: Performed by: PHYSICIAN ASSISTANT

## 2021-01-01 PROCEDURE — 82248 BILIRUBIN DIRECT: CPT | Performed by: HOSPITALIST

## 2021-01-01 PROCEDURE — 25010000002 FENTANYL CITRATE (PF) 2500 MCG/50ML SOLUTION: Performed by: INTERNAL MEDICINE

## 2021-01-01 PROCEDURE — 87040 BLOOD CULTURE FOR BACTERIA: CPT | Performed by: STUDENT IN AN ORGANIZED HEALTH CARE EDUCATION/TRAINING PROGRAM

## 2021-01-01 PROCEDURE — 82787 IGG 1 2 3 OR 4 EACH: CPT | Performed by: INTERNAL MEDICINE

## 2021-01-01 PROCEDURE — 83605 ASSAY OF LACTIC ACID: CPT | Performed by: EMERGENCY MEDICINE

## 2021-01-01 PROCEDURE — 25010000002 LINEZOLID 600 MG/300ML SOLUTION: Performed by: HOSPITALIST

## 2021-01-01 PROCEDURE — P9047 ALBUMIN (HUMAN), 25%, 50ML: HCPCS | Performed by: INTERNAL MEDICINE

## 2021-01-01 PROCEDURE — 83735 ASSAY OF MAGNESIUM: CPT | Performed by: INTERNAL MEDICINE

## 2021-01-01 PROCEDURE — 87206 SMEAR FLUORESCENT/ACID STAI: CPT | Performed by: INTERNAL MEDICINE

## 2021-01-01 PROCEDURE — 85025 COMPLETE CBC W/AUTO DIFF WBC: CPT | Performed by: HOSPITALIST

## 2021-01-01 PROCEDURE — 82728 ASSAY OF FERRITIN: CPT | Performed by: HOSPITALIST

## 2021-01-01 PROCEDURE — 84100 ASSAY OF PHOSPHORUS: CPT | Performed by: STUDENT IN AN ORGANIZED HEALTH CARE EDUCATION/TRAINING PROGRAM

## 2021-01-01 PROCEDURE — 80053 COMPREHEN METABOLIC PANEL: CPT | Performed by: STUDENT IN AN ORGANIZED HEALTH CARE EDUCATION/TRAINING PROGRAM

## 2021-01-01 PROCEDURE — 80202 ASSAY OF VANCOMYCIN: CPT | Performed by: PHYSICIAN ASSISTANT

## 2021-01-01 PROCEDURE — 86612 BLASTOMYCES ANTIBODY: CPT | Performed by: NURSE PRACTITIONER

## 2021-01-01 PROCEDURE — 86140 C-REACTIVE PROTEIN: CPT | Performed by: STUDENT IN AN ORGANIZED HEALTH CARE EDUCATION/TRAINING PROGRAM

## 2021-01-01 PROCEDURE — 25010000002 HEPARIN (PORCINE) PER 1000 UNITS: Performed by: HOSPITALIST

## 2021-01-01 PROCEDURE — 86200 CCP ANTIBODY: CPT | Performed by: INTERNAL MEDICINE

## 2021-01-01 PROCEDURE — 86162 COMPLEMENT TOTAL (CH50): CPT | Performed by: INTERNAL MEDICINE

## 2021-01-01 PROCEDURE — 94760 N-INVAS EAR/PLS OXIMETRY 1: CPT

## 2021-01-01 PROCEDURE — 5A1D70Z PERFORMANCE OF URINARY FILTRATION, INTERMITTENT, LESS THAN 6 HOURS PER DAY: ICD-10-PCS | Performed by: INTERNAL MEDICINE

## 2021-01-01 PROCEDURE — 70498 CT ANGIOGRAPHY NECK: CPT

## 2021-01-01 PROCEDURE — 25010000002 VANCOMYCIN 5 G RECONSTITUTED SOLUTION

## 2021-01-01 PROCEDURE — 80053 COMPREHEN METABOLIC PANEL: CPT | Performed by: EMERGENCY MEDICINE

## 2021-01-01 PROCEDURE — 63710000001 INSULIN DETEMIR PER 5 UNITS: Performed by: INTERNAL MEDICINE

## 2021-01-01 PROCEDURE — 25010000003 MAGNESIUM SULFATE 4 GM/100ML SOLUTION: Performed by: PHYSICIAN ASSISTANT

## 2021-01-01 PROCEDURE — 83735 ASSAY OF MAGNESIUM: CPT | Performed by: HOSPITALIST

## 2021-01-01 PROCEDURE — 85610 PROTHROMBIN TIME: CPT | Performed by: HOSPITALIST

## 2021-01-01 PROCEDURE — 93005 ELECTROCARDIOGRAM TRACING: CPT | Performed by: EMERGENCY MEDICINE

## 2021-01-01 PROCEDURE — 25010000002 MEROPENEM PER 100 MG: Performed by: INTERNAL MEDICINE

## 2021-01-01 PROCEDURE — 0BH17EZ INSERTION OF ENDOTRACHEAL AIRWAY INTO TRACHEA, VIA NATURAL OR ARTIFICIAL OPENING: ICD-10-PCS | Performed by: STUDENT IN AN ORGANIZED HEALTH CARE EDUCATION/TRAINING PROGRAM

## 2021-01-01 PROCEDURE — 25010000002 VORICONAZOLE PER 10 MG: Performed by: INTERNAL MEDICINE

## 2021-01-01 PROCEDURE — 84100 ASSAY OF PHOSPHORUS: CPT | Performed by: HOSPITALIST

## 2021-01-01 PROCEDURE — 71045 X-RAY EXAM CHEST 1 VIEW: CPT | Performed by: RADIOLOGY

## 2021-01-01 PROCEDURE — 25010000002 LORAZEPAM PER 2 MG: Performed by: NURSE PRACTITIONER

## 2021-01-01 PROCEDURE — 86160 COMPLEMENT ANTIGEN: CPT | Performed by: INTERNAL MEDICINE

## 2021-01-01 PROCEDURE — 87070 CULTURE OTHR SPECIMN AEROBIC: CPT | Performed by: INTERNAL MEDICINE

## 2021-01-01 PROCEDURE — 25010000002 LINEZOLID 600 MG/300ML SOLUTION: Performed by: STUDENT IN AN ORGANIZED HEALTH CARE EDUCATION/TRAINING PROGRAM

## 2021-01-01 PROCEDURE — 25010000002 PIPERACILLIN SOD-TAZOBACTAM PER 1 G: Performed by: EMERGENCY MEDICINE

## 2021-01-01 PROCEDURE — 70496 CT ANGIOGRAPHY HEAD: CPT

## 2021-01-01 PROCEDURE — 87205 SMEAR GRAM STAIN: CPT | Performed by: INTERNAL MEDICINE

## 2021-01-01 PROCEDURE — 99232 SBSQ HOSP IP/OBS MODERATE 35: CPT | Performed by: INTERNAL MEDICINE

## 2021-01-01 PROCEDURE — 25010000002 FENTANYL CITRATE (PF) 2500 MCG/50ML SOLUTION: Performed by: HOSPITALIST

## 2021-01-01 PROCEDURE — 25010000002 VANCOMYCIN 1 G RECONSTITUTED SOLUTION: Performed by: INTERNAL MEDICINE

## 2021-01-01 PROCEDURE — 25010000002 SUCCINYLCHOLINE PER 20 MG: Performed by: STUDENT IN AN ORGANIZED HEALTH CARE EDUCATION/TRAINING PROGRAM

## 2021-01-01 PROCEDURE — 85730 THROMBOPLASTIN TIME PARTIAL: CPT | Performed by: HOSPITALIST

## 2021-01-01 PROCEDURE — 25010000002 ALBUMIN HUMAN 25% PER 50 ML: Performed by: INTERNAL MEDICINE

## 2021-01-01 PROCEDURE — 99223 1ST HOSP IP/OBS HIGH 75: CPT | Performed by: HOSPITALIST

## 2021-01-01 PROCEDURE — 83690 ASSAY OF LIPASE: CPT | Performed by: INTERNAL MEDICINE

## 2021-01-01 PROCEDURE — 25010000002 FENTANYL CITRATE (PF) 1000 MCG/20ML SOLUTION

## 2021-01-01 PROCEDURE — 25010000002 HYDROMORPHONE PER 4 MG: Performed by: ANESTHESIOLOGY

## 2021-01-01 PROCEDURE — 80202 ASSAY OF VANCOMYCIN: CPT | Performed by: INTERNAL MEDICINE

## 2021-01-01 PROCEDURE — 93970 EXTREMITY STUDY: CPT

## 2021-01-01 PROCEDURE — 74176 CT ABD & PELVIS W/O CONTRAST: CPT | Performed by: RADIOLOGY

## 2021-01-01 PROCEDURE — 83615 LACTATE (LD) (LDH) ENZYME: CPT | Performed by: STUDENT IN AN ORGANIZED HEALTH CARE EDUCATION/TRAINING PROGRAM

## 2021-01-01 PROCEDURE — 96375 TX/PRO/DX INJ NEW DRUG ADDON: CPT

## 2021-01-01 PROCEDURE — 84100 ASSAY OF PHOSPHORUS: CPT | Performed by: INTERNAL MEDICINE

## 2021-01-01 PROCEDURE — 87040 BLOOD CULTURE FOR BACTERIA: CPT | Performed by: NURSE PRACTITIONER

## 2021-01-01 PROCEDURE — 85025 COMPLETE CBC W/AUTO DIFF WBC: CPT | Performed by: EMERGENCY MEDICINE

## 2021-01-01 PROCEDURE — 94762 N-INVAS EAR/PLS OXIMTRY CONT: CPT

## 2021-01-01 PROCEDURE — B543ZZA ULTRASONOGRAPHY OF RIGHT JUGULAR VEINS, GUIDANCE: ICD-10-PCS | Performed by: STUDENT IN AN ORGANIZED HEALTH CARE EDUCATION/TRAINING PROGRAM

## 2021-01-01 PROCEDURE — 25010000002 DEXAMETHASONE PER 1 MG: Performed by: STUDENT IN AN ORGANIZED HEALTH CARE EDUCATION/TRAINING PROGRAM

## 2021-01-01 PROCEDURE — 82820 HEMOGLOBIN-OXYGEN AFFINITY: CPT

## 2021-01-01 PROCEDURE — 0 IOPAMIDOL PER 1 ML: Performed by: INTERNAL MEDICINE

## 2021-01-01 PROCEDURE — 84145 PROCALCITONIN (PCT): CPT | Performed by: STUDENT IN AN ORGANIZED HEALTH CARE EDUCATION/TRAINING PROGRAM

## 2021-01-01 PROCEDURE — 86900 BLOOD TYPING SEROLOGIC ABO: CPT | Performed by: EMERGENCY MEDICINE

## 2021-01-01 PROCEDURE — 84484 ASSAY OF TROPONIN QUANT: CPT | Performed by: INTERNAL MEDICINE

## 2021-01-01 PROCEDURE — 93306 TTE W/DOPPLER COMPLETE: CPT

## 2021-01-01 PROCEDURE — 25010000002 DEXAMETHASONE PER 1 MG: Performed by: HOSPITALIST

## 2021-01-01 PROCEDURE — 25010000002 CALCIUM GLUCONATE PER 10 ML: Performed by: INTERNAL MEDICINE

## 2021-01-01 PROCEDURE — 71275 CT ANGIOGRAPHY CHEST: CPT

## 2021-01-01 PROCEDURE — 25010000002 VANCOMYCIN PER 500 MG: Performed by: INTERNAL MEDICINE

## 2021-01-01 PROCEDURE — 93325 DOPPLER ECHO COLOR FLOW MAPG: CPT

## 2021-01-01 PROCEDURE — 5A1955Z RESPIRATORY VENTILATION, GREATER THAN 96 CONSECUTIVE HOURS: ICD-10-PCS | Performed by: STUDENT IN AN ORGANIZED HEALTH CARE EDUCATION/TRAINING PROGRAM

## 2021-01-01 PROCEDURE — 86140 C-REACTIVE PROTEIN: CPT | Performed by: EMERGENCY MEDICINE

## 2021-01-01 PROCEDURE — 25010000003 POTASSIUM CHLORIDE PER 2 MEQ: Performed by: STUDENT IN AN ORGANIZED HEALTH CARE EDUCATION/TRAINING PROGRAM

## 2021-01-01 PROCEDURE — 82550 ASSAY OF CK (CPK): CPT | Performed by: EMERGENCY MEDICINE

## 2021-01-01 PROCEDURE — 80048 BASIC METABOLIC PNL TOTAL CA: CPT | Performed by: HOSPITALIST

## 2021-01-01 PROCEDURE — 86901 BLOOD TYPING SEROLOGIC RH(D): CPT

## 2021-01-01 PROCEDURE — 84439 ASSAY OF FREE THYROXINE: CPT | Performed by: EMERGENCY MEDICINE

## 2021-01-01 PROCEDURE — 86480 TB TEST CELL IMMUN MEASURE: CPT | Performed by: NURSE PRACTITIONER

## 2021-01-01 PROCEDURE — 82040 ASSAY OF SERUM ALBUMIN: CPT | Performed by: HOSPITALIST

## 2021-01-01 PROCEDURE — 86256 FLUORESCENT ANTIBODY TITER: CPT | Performed by: INTERNAL MEDICINE

## 2021-01-01 PROCEDURE — 31500 INSERT EMERGENCY AIRWAY: CPT

## 2021-01-01 PROCEDURE — 76705 ECHO EXAM OF ABDOMEN: CPT

## 2021-01-01 PROCEDURE — 63710000001 INSULIN DETEMIR PER 5 UNITS: Performed by: NURSE PRACTITIONER

## 2021-01-01 PROCEDURE — 85007 BL SMEAR W/DIFF WBC COUNT: CPT | Performed by: HOSPITALIST

## 2021-01-01 PROCEDURE — 36556 INSERT NON-TUNNEL CV CATH: CPT | Performed by: SURGERY

## 2021-01-01 PROCEDURE — 25010000002 PIPERACILLIN SOD-TAZOBACTAM PER 1 G: Performed by: INTERNAL MEDICINE

## 2021-01-01 PROCEDURE — 86606 ASPERGILLUS ANTIBODY: CPT | Performed by: NURSE PRACTITIONER

## 2021-01-01 PROCEDURE — 85379 FIBRIN DEGRADATION QUANT: CPT | Performed by: STUDENT IN AN ORGANIZED HEALTH CARE EDUCATION/TRAINING PROGRAM

## 2021-01-01 PROCEDURE — A9540 TC99M MAA: HCPCS | Performed by: STUDENT IN AN ORGANIZED HEALTH CARE EDUCATION/TRAINING PROGRAM

## 2021-01-01 PROCEDURE — 85025 COMPLETE CBC W/AUTO DIFF WBC: CPT | Performed by: STUDENT IN AN ORGANIZED HEALTH CARE EDUCATION/TRAINING PROGRAM

## 2021-01-01 PROCEDURE — 93321 DOPPLER ECHO F-UP/LMTD STD: CPT

## 2021-01-01 PROCEDURE — 84484 ASSAY OF TROPONIN QUANT: CPT | Performed by: EMERGENCY MEDICINE

## 2021-01-01 PROCEDURE — 83605 ASSAY OF LACTIC ACID: CPT | Performed by: STUDENT IN AN ORGANIZED HEALTH CARE EDUCATION/TRAINING PROGRAM

## 2021-01-01 PROCEDURE — 99291 CRITICAL CARE FIRST HOUR: CPT

## 2021-01-01 PROCEDURE — 93971 EXTREMITY STUDY: CPT | Performed by: RADIOLOGY

## 2021-01-01 PROCEDURE — 85610 PROTHROMBIN TIME: CPT | Performed by: INTERNAL MEDICINE

## 2021-01-01 PROCEDURE — 93970 EXTREMITY STUDY: CPT | Performed by: RADIOLOGY

## 2021-01-01 PROCEDURE — 99222 1ST HOSP IP/OBS MODERATE 55: CPT | Performed by: INTERNAL MEDICINE

## 2021-01-01 PROCEDURE — 83880 ASSAY OF NATRIURETIC PEPTIDE: CPT | Performed by: EMERGENCY MEDICINE

## 2021-01-01 PROCEDURE — 80202 ASSAY OF VANCOMYCIN: CPT

## 2021-01-01 PROCEDURE — 70551 MRI BRAIN STEM W/O DYE: CPT

## 2021-01-01 PROCEDURE — 99222 1ST HOSP IP/OBS MODERATE 55: CPT | Performed by: PSYCHIATRY & NEUROLOGY

## 2021-01-01 PROCEDURE — 82009 KETONE BODYS QUAL: CPT | Performed by: STUDENT IN AN ORGANIZED HEALTH CARE EDUCATION/TRAINING PROGRAM

## 2021-01-01 PROCEDURE — 81001 URINALYSIS AUTO W/SCOPE: CPT | Performed by: STUDENT IN AN ORGANIZED HEALTH CARE EDUCATION/TRAINING PROGRAM

## 2021-01-01 PROCEDURE — 25010000002 FUROSEMIDE PER 20 MG: Performed by: INTERNAL MEDICINE

## 2021-01-01 PROCEDURE — 85730 THROMBOPLASTIN TIME PARTIAL: CPT | Performed by: EMERGENCY MEDICINE

## 2021-01-01 PROCEDURE — 87641 MR-STAPH DNA AMP PROBE: CPT | Performed by: INTERNAL MEDICINE

## 2021-01-01 PROCEDURE — 76705 ECHO EXAM OF ABDOMEN: CPT | Performed by: RADIOLOGY

## 2021-01-01 PROCEDURE — 86900 BLOOD TYPING SEROLOGIC ABO: CPT

## 2021-01-01 PROCEDURE — 85652 RBC SED RATE AUTOMATED: CPT | Performed by: EMERGENCY MEDICINE

## 2021-01-01 PROCEDURE — 05HM33Z INSERTION OF INFUSION DEVICE INTO RIGHT INTERNAL JUGULAR VEIN, PERCUTANEOUS APPROACH: ICD-10-PCS | Performed by: STUDENT IN AN ORGANIZED HEALTH CARE EDUCATION/TRAINING PROGRAM

## 2021-01-01 PROCEDURE — 83036 HEMOGLOBIN GLYCOSYLATED A1C: CPT | Performed by: STUDENT IN AN ORGANIZED HEALTH CARE EDUCATION/TRAINING PROGRAM

## 2021-01-01 PROCEDURE — 25010000002 DIPHENHYDRAMINE PER 50 MG: Performed by: EMERGENCY MEDICINE

## 2021-01-01 PROCEDURE — 25010000002 MORPHINE (PF) 10 MG/ML SOLUTION: Performed by: NURSE PRACTITIONER

## 2021-01-01 PROCEDURE — 99233 SBSQ HOSP IP/OBS HIGH 50: CPT | Performed by: INTERNAL MEDICINE

## 2021-01-01 PROCEDURE — 93325 DOPPLER ECHO COLOR FLOW MAPG: CPT | Performed by: INTERNAL MEDICINE

## 2021-01-01 PROCEDURE — 80076 HEPATIC FUNCTION PANEL: CPT | Performed by: HOSPITALIST

## 2021-01-01 PROCEDURE — 70498 CT ANGIOGRAPHY NECK: CPT | Performed by: RADIOLOGY

## 2021-01-01 PROCEDURE — 82728 ASSAY OF FERRITIN: CPT | Performed by: STUDENT IN AN ORGANIZED HEALTH CARE EDUCATION/TRAINING PROGRAM

## 2021-01-01 PROCEDURE — C1751 CATH, INF, PER/CENT/MIDLINE: HCPCS

## 2021-01-01 PROCEDURE — 70450 CT HEAD/BRAIN W/O DYE: CPT | Performed by: RADIOLOGY

## 2021-01-01 PROCEDURE — 25010000002 MEROPENEM PER 100 MG

## 2021-01-01 PROCEDURE — 70496 CT ANGIOGRAPHY HEAD: CPT | Performed by: RADIOLOGY

## 2021-01-01 PROCEDURE — 94002 VENT MGMT INPAT INIT DAY: CPT

## 2021-01-01 PROCEDURE — 0042T CT CEREBRAL PERFUSION W WO CONTRAST: CPT | Performed by: RADIOLOGY

## 2021-01-01 PROCEDURE — 87636 SARSCOV2 & INF A&B AMP PRB: CPT | Performed by: STUDENT IN AN ORGANIZED HEALTH CARE EDUCATION/TRAINING PROGRAM

## 2021-01-01 PROCEDURE — 25010000002 CEFTRIAXONE PER 250 MG: Performed by: HOSPITALIST

## 2021-01-01 PROCEDURE — 25010000002 METOCLOPRAMIDE PER 10 MG: Performed by: INTERNAL MEDICINE

## 2021-01-01 PROCEDURE — 85730 THROMBOPLASTIN TIME PARTIAL: CPT | Performed by: PHYSICIAN ASSISTANT

## 2021-01-01 PROCEDURE — 80074 ACUTE HEPATITIS PANEL: CPT | Performed by: INTERNAL MEDICINE

## 2021-01-01 PROCEDURE — 70450 CT HEAD/BRAIN W/O DYE: CPT

## 2021-01-01 PROCEDURE — 80048 BASIC METABOLIC PNL TOTAL CA: CPT | Performed by: STUDENT IN AN ORGANIZED HEALTH CARE EDUCATION/TRAINING PROGRAM

## 2021-01-01 PROCEDURE — 93005 ELECTROCARDIOGRAM TRACING: CPT | Performed by: NURSE PRACTITIONER

## 2021-01-01 PROCEDURE — 96374 THER/PROPH/DIAG INJ IV PUSH: CPT

## 2021-01-01 PROCEDURE — 87305 ASPERGILLUS AG IA: CPT | Performed by: NURSE PRACTITIONER

## 2021-01-01 PROCEDURE — 25010000002 FENTANYL CITRATE (PF) 50 MCG/ML SOLUTION: Performed by: INTERNAL MEDICINE

## 2021-01-01 PROCEDURE — 84478 ASSAY OF TRIGLYCERIDES: CPT | Performed by: INTERNAL MEDICINE

## 2021-01-01 PROCEDURE — 84443 ASSAY THYROID STIM HORMONE: CPT | Performed by: EMERGENCY MEDICINE

## 2021-01-01 PROCEDURE — 25010000002 MAGNESIUM SULFATE 2 GM/50ML SOLUTION: Performed by: INTERNAL MEDICINE

## 2021-01-01 PROCEDURE — 93308 TTE F-UP OR LMTD: CPT

## 2021-01-01 PROCEDURE — 82330 ASSAY OF CALCIUM: CPT | Performed by: NURSE PRACTITIONER

## 2021-01-01 PROCEDURE — 05HM33Z INSERTION OF INFUSION DEVICE INTO RIGHT INTERNAL JUGULAR VEIN, PERCUTANEOUS APPROACH: ICD-10-PCS | Performed by: SURGERY

## 2021-01-01 PROCEDURE — 87640 STAPH A DNA AMP PROBE: CPT | Performed by: INTERNAL MEDICINE

## 2021-01-01 PROCEDURE — 25010000002 MORPHINE PER 10 MG: Performed by: NURSE PRACTITIONER

## 2021-01-01 PROCEDURE — 86901 BLOOD TYPING SEROLOGIC RH(D): CPT | Performed by: EMERGENCY MEDICINE

## 2021-01-01 PROCEDURE — 80048 BASIC METABOLIC PNL TOTAL CA: CPT | Performed by: INTERNAL MEDICINE

## 2021-01-01 PROCEDURE — 25010000002 LORAZEPAM PER 2 MG: Performed by: EMERGENCY MEDICINE

## 2021-01-01 PROCEDURE — 83690 ASSAY OF LIPASE: CPT | Performed by: STUDENT IN AN ORGANIZED HEALTH CARE EDUCATION/TRAINING PROGRAM

## 2021-01-01 PROCEDURE — 84484 ASSAY OF TROPONIN QUANT: CPT | Performed by: STUDENT IN AN ORGANIZED HEALTH CARE EDUCATION/TRAINING PROGRAM

## 2021-01-01 PROCEDURE — 71250 CT THORAX DX C-: CPT

## 2021-01-01 PROCEDURE — 87116 MYCOBACTERIA CULTURE: CPT | Performed by: INTERNAL MEDICINE

## 2021-01-01 PROCEDURE — 51702 INSERT TEMP BLADDER CATH: CPT

## 2021-01-01 PROCEDURE — 25010000003 POTASSIUM CHLORIDE PER 2 MEQ: Performed by: HOSPITALIST

## 2021-01-01 PROCEDURE — 0 IOPAMIDOL PER 1 ML: Performed by: EMERGENCY MEDICINE

## 2021-01-01 PROCEDURE — 78580 LUNG PERFUSION IMAGING: CPT

## 2021-01-01 PROCEDURE — 86038 ANTINUCLEAR ANTIBODIES: CPT | Performed by: INTERNAL MEDICINE

## 2021-01-01 PROCEDURE — 80061 LIPID PANEL: CPT | Performed by: INTERNAL MEDICINE

## 2021-01-01 PROCEDURE — 70551 MRI BRAIN STEM W/O DYE: CPT | Performed by: RADIOLOGY

## 2021-01-01 PROCEDURE — 87899 AGENT NOS ASSAY W/OPTIC: CPT | Performed by: INTERNAL MEDICINE

## 2021-01-01 PROCEDURE — 63710000001 INSULIN REGULAR HUMAN PER 5 UNITS: Performed by: EMERGENCY MEDICINE

## 2021-01-01 PROCEDURE — 93308 TTE F-UP OR LMTD: CPT | Performed by: INTERNAL MEDICINE

## 2021-01-01 PROCEDURE — 87633 RESP VIRUS 12-25 TARGETS: CPT | Performed by: INTERNAL MEDICINE

## 2021-01-01 PROCEDURE — 86850 RBC ANTIBODY SCREEN: CPT | Performed by: EMERGENCY MEDICINE

## 2021-01-01 PROCEDURE — 86225 DNA ANTIBODY NATIVE: CPT | Performed by: INTERNAL MEDICINE

## 2021-01-01 PROCEDURE — 85610 PROTHROMBIN TIME: CPT | Performed by: EMERGENCY MEDICINE

## 2021-01-01 PROCEDURE — 0 TECHNETIUM ALBUMIN AGGREGATED: Performed by: STUDENT IN AN ORGANIZED HEALTH CARE EDUCATION/TRAINING PROGRAM

## 2021-01-01 PROCEDURE — 93005 ELECTROCARDIOGRAM TRACING: CPT | Performed by: HOSPITALIST

## 2021-01-01 PROCEDURE — 25010000002 MICAFUNGIN SODIUM 100 MG RECONSTITUTED SOLUTION 1 EACH VIAL: Performed by: NURSE PRACTITIONER

## 2021-01-01 PROCEDURE — 83930 ASSAY OF BLOOD OSMOLALITY: CPT | Performed by: STUDENT IN AN ORGANIZED HEALTH CARE EDUCATION/TRAINING PROGRAM

## 2021-01-01 PROCEDURE — 85007 BL SMEAR W/DIFF WBC COUNT: CPT | Performed by: STUDENT IN AN ORGANIZED HEALTH CARE EDUCATION/TRAINING PROGRAM

## 2021-01-01 RX ORDER — MIDAZOLAM IN NACL,ISO-OSMOT/PF 50 MG/50ML
1-10 INFUSION BOTTLE (ML) INTRAVENOUS
Status: DISCONTINUED | OUTPATIENT
Start: 2021-01-01 | End: 2021-01-01

## 2021-01-01 RX ORDER — ACETAMINOPHEN 650 MG/1
650 SUPPOSITORY RECTAL EVERY 4 HOURS PRN
Status: DISCONTINUED | OUTPATIENT
Start: 2021-01-01 | End: 2021-01-01 | Stop reason: HOSPADM

## 2021-01-01 RX ORDER — DEXTROSE, SODIUM CHLORIDE, AND POTASSIUM CHLORIDE 5; .45; .15 G/100ML; G/100ML; G/100ML
150 INJECTION INTRAVENOUS CONTINUOUS PRN
Status: DISCONTINUED | OUTPATIENT
Start: 2021-01-01 | End: 2021-01-01

## 2021-01-01 RX ORDER — LORAZEPAM 2 MG/ML
1 INJECTION INTRAMUSCULAR EVERY 4 HOURS PRN
Status: DISCONTINUED | OUTPATIENT
Start: 2021-01-01 | End: 2021-01-01 | Stop reason: HOSPADM

## 2021-01-01 RX ORDER — DEXTROSE AND SODIUM CHLORIDE 5; .45 G/100ML; G/100ML
150 INJECTION, SOLUTION INTRAVENOUS CONTINUOUS PRN
Status: DISCONTINUED | OUTPATIENT
Start: 2021-01-01 | End: 2021-01-01

## 2021-01-01 RX ORDER — ROCURONIUM BROMIDE 10 MG/ML
1.2 INJECTION, SOLUTION INTRAVENOUS ONCE AS NEEDED
Status: DISCONTINUED | OUTPATIENT
Start: 2021-01-01 | End: 2021-01-01

## 2021-01-01 RX ORDER — FUROSEMIDE 10 MG/ML
80 INJECTION INTRAMUSCULAR; INTRAVENOUS ONCE
Status: COMPLETED | OUTPATIENT
Start: 2021-01-01 | End: 2021-01-01

## 2021-01-01 RX ORDER — DEXTROSE MONOHYDRATE 25 G/50ML
25-50 INJECTION, SOLUTION INTRAVENOUS
Status: DISCONTINUED | OUTPATIENT
Start: 2021-01-01 | End: 2021-01-01

## 2021-01-01 RX ORDER — DEXAMETHASONE SODIUM PHOSPHATE 4 MG/ML
6 INJECTION, SOLUTION INTRA-ARTICULAR; INTRALESIONAL; INTRAMUSCULAR; INTRAVENOUS; SOFT TISSUE EVERY 24 HOURS
Status: DISCONTINUED | OUTPATIENT
Start: 2021-01-01 | End: 2021-01-01

## 2021-01-01 RX ORDER — HEPARIN SODIUM 5000 [USP'U]/ML
5000 INJECTION, SOLUTION INTRAVENOUS; SUBCUTANEOUS EVERY 8 HOURS SCHEDULED
Status: DISCONTINUED | OUTPATIENT
Start: 2021-01-01 | End: 2021-01-01

## 2021-01-01 RX ORDER — LORAZEPAM 2 MG/ML
1.5 INJECTION INTRAMUSCULAR ONCE
Status: COMPLETED | OUTPATIENT
Start: 2021-01-01 | End: 2021-01-01

## 2021-01-01 RX ORDER — SODIUM CHLORIDE 0.9 % (FLUSH) 0.9 %
20 SYRINGE (ML) INJECTION AS NEEDED
Status: DISCONTINUED | OUTPATIENT
Start: 2021-01-01 | End: 2021-01-01 | Stop reason: HOSPADM

## 2021-01-01 RX ORDER — METOCLOPRAMIDE HYDROCHLORIDE 5 MG/ML
10 INJECTION INTRAMUSCULAR; INTRAVENOUS EVERY 6 HOURS
Status: DISPENSED | OUTPATIENT
Start: 2021-01-01 | End: 2021-01-01

## 2021-01-01 RX ORDER — NOREPINEPHRINE BIT/0.9 % NACL 8 MG/250ML
.02-.3 INFUSION BOTTLE (ML) INTRAVENOUS
Status: DISCONTINUED | OUTPATIENT
Start: 2021-01-01 | End: 2021-01-01

## 2021-01-01 RX ORDER — SODIUM BICARBONATE 650 MG/1
650 TABLET ORAL 4 TIMES DAILY
Status: DISCONTINUED | OUTPATIENT
Start: 2021-01-01 | End: 2021-01-01

## 2021-01-01 RX ORDER — DOXYCYCLINE 100 MG/1
100 CAPSULE ORAL EVERY 12 HOURS SCHEDULED
Status: CANCELLED | OUTPATIENT
Start: 2021-01-01 | End: 2021-01-01

## 2021-01-01 RX ORDER — GLYCOPYRROLATE 0.2 MG/ML
0.4 INJECTION INTRAMUSCULAR; INTRAVENOUS EVERY 4 HOURS PRN
Status: DISCONTINUED | OUTPATIENT
Start: 2021-01-01 | End: 2021-01-01 | Stop reason: HOSPADM

## 2021-01-01 RX ORDER — MAGNESIUM SULFATE 1 G/100ML
1 INJECTION INTRAVENOUS AS NEEDED
Status: DISCONTINUED | OUTPATIENT
Start: 2021-01-01 | End: 2021-01-01

## 2021-01-01 RX ORDER — ALBUMIN (HUMAN) 12.5 G/50ML
25 SOLUTION INTRAVENOUS AS NEEDED
Status: DISPENSED | OUTPATIENT
Start: 2021-01-01 | End: 2021-01-01

## 2021-01-01 RX ORDER — MAGNESIUM SULFATE HEPTAHYDRATE 40 MG/ML
2 INJECTION, SOLUTION INTRAVENOUS AS NEEDED
Status: DISCONTINUED | OUTPATIENT
Start: 2021-01-01 | End: 2021-01-01

## 2021-01-01 RX ORDER — DEXTROSE MONOHYDRATE AND SODIUM CHLORIDE 5; .9 G/100ML; G/100ML
20 INJECTION, SOLUTION INTRAVENOUS CONTINUOUS
Status: DISCONTINUED | OUTPATIENT
Start: 2021-01-01 | End: 2021-01-01

## 2021-01-01 RX ORDER — HEPARIN SODIUM 5000 [USP'U]/ML
5000 INJECTION, SOLUTION INTRAVENOUS; SUBCUTANEOUS ONCE
Status: COMPLETED | OUTPATIENT
Start: 2021-01-01 | End: 2021-01-01

## 2021-01-01 RX ORDER — CLINDAMYCIN PHOSPHATE 600 MG/50ML
600 INJECTION INTRAVENOUS EVERY 8 HOURS
Status: DISCONTINUED | OUTPATIENT
Start: 2021-01-01 | End: 2021-01-01

## 2021-01-01 RX ORDER — KETAMINE HCL IN NACL, ISO-OSM 100MG/10ML
1 SYRINGE (ML) INJECTION ONCE AS NEEDED
Status: COMPLETED | OUTPATIENT
Start: 2021-01-01 | End: 2021-01-01

## 2021-01-01 RX ORDER — SODIUM CHLORIDE AND POTASSIUM CHLORIDE 300; 900 MG/100ML; MG/100ML
250 INJECTION, SOLUTION INTRAVENOUS CONTINUOUS PRN
Status: DISCONTINUED | OUTPATIENT
Start: 2021-01-01 | End: 2021-01-01

## 2021-01-01 RX ORDER — FENTANYL CITRATE/PF 100MCG/2ML
SYRINGE (ML) INTRAVENOUS
Status: CANCELLED | OUTPATIENT
Start: 2021-01-01 | End: 2021-01-01

## 2021-01-01 RX ORDER — SODIUM CHLORIDE 450 MG/100ML
250 INJECTION, SOLUTION INTRAVENOUS CONTINUOUS PRN
Status: DISCONTINUED | OUTPATIENT
Start: 2021-01-01 | End: 2021-01-01

## 2021-01-01 RX ORDER — CHLORHEXIDINE GLUCONATE 0.12 MG/ML
15 RINSE ORAL EVERY 12 HOURS SCHEDULED
Status: DISCONTINUED | OUTPATIENT
Start: 2021-01-01 | End: 2021-01-01 | Stop reason: HOSPADM

## 2021-01-01 RX ORDER — LORAZEPAM 2 MG/ML
0.5 INJECTION INTRAMUSCULAR ONCE
Status: COMPLETED | OUTPATIENT
Start: 2021-01-01 | End: 2021-01-01

## 2021-01-01 RX ORDER — NYSTATIN 100000 [USP'U]/G
POWDER TOPICAL EVERY 12 HOURS SCHEDULED
Status: DISCONTINUED | OUTPATIENT
Start: 2021-01-01 | End: 2021-01-01 | Stop reason: HOSPADM

## 2021-01-01 RX ORDER — AMOXICILLIN 250 MG
1 CAPSULE ORAL 2 TIMES DAILY
Status: DISCONTINUED | OUTPATIENT
Start: 2021-01-01 | End: 2021-01-01

## 2021-01-01 RX ORDER — ONDANSETRON 2 MG/ML
4 INJECTION INTRAMUSCULAR; INTRAVENOUS EVERY 6 HOURS PRN
Status: DISCONTINUED | OUTPATIENT
Start: 2021-01-01 | End: 2021-01-01 | Stop reason: HOSPADM

## 2021-01-01 RX ORDER — DEXAMETHASONE SODIUM PHOSPHATE 10 MG/ML
6 INJECTION INTRAMUSCULAR; INTRAVENOUS DAILY
Status: DISCONTINUED | OUTPATIENT
Start: 2021-01-01 | End: 2021-01-01

## 2021-01-01 RX ORDER — ASPIRIN 81 MG/1
81 TABLET ORAL DAILY
Status: DISCONTINUED | OUTPATIENT
Start: 2021-01-01 | End: 2021-01-01

## 2021-01-01 RX ORDER — SODIUM CHLORIDE 0.9 % (FLUSH) 0.9 %
10 SYRINGE (ML) INJECTION EVERY 12 HOURS SCHEDULED
Status: DISCONTINUED | OUTPATIENT
Start: 2021-01-01 | End: 2021-01-01

## 2021-01-01 RX ORDER — FENTANYL CITRATE/PF 100MCG/2ML
SYRINGE (ML) INTRAVENOUS
Status: DISPENSED | OUTPATIENT
Start: 2021-01-01 | End: 2021-01-01

## 2021-01-01 RX ORDER — SODIUM CHLORIDE 0.9 % (FLUSH) 0.9 %
10 SYRINGE (ML) INJECTION ONCE AS NEEDED
Status: DISCONTINUED | OUTPATIENT
Start: 2021-01-01 | End: 2021-01-01

## 2021-01-01 RX ORDER — ALBUMIN (HUMAN) 12.5 G/50ML
50 SOLUTION INTRAVENOUS AS NEEDED
Status: ACTIVE | OUTPATIENT
Start: 2021-01-01 | End: 2021-01-01

## 2021-01-01 RX ORDER — DIPHENHYDRAMINE HYDROCHLORIDE 50 MG/ML
50 INJECTION INTRAMUSCULAR; INTRAVENOUS ONCE
Status: COMPLETED | OUTPATIENT
Start: 2021-01-01 | End: 2021-01-01

## 2021-01-01 RX ORDER — CASTOR OIL AND BALSAM, PERU 788; 87 MG/G; MG/G
1 OINTMENT TOPICAL EVERY 12 HOURS SCHEDULED
Status: DISCONTINUED | OUTPATIENT
Start: 2021-01-01 | End: 2021-01-01 | Stop reason: HOSPADM

## 2021-01-01 RX ORDER — DEXTROSE, SODIUM CHLORIDE, AND POTASSIUM CHLORIDE 5; .45; .3 G/100ML; G/100ML; G/100ML
150 INJECTION INTRAVENOUS CONTINUOUS PRN
Status: DISCONTINUED | OUTPATIENT
Start: 2021-01-01 | End: 2021-01-01

## 2021-01-01 RX ORDER — FENTANYL CITRATE/PF 100MCG/2ML
SYRINGE (ML) INTRAVENOUS CONTINUOUS
Status: DISCONTINUED | OUTPATIENT
Start: 2021-01-01 | End: 2021-01-01

## 2021-01-01 RX ORDER — ALBUTEROL SULFATE 90 UG/1
2 AEROSOL, METERED RESPIRATORY (INHALATION) EVERY 6 HOURS PRN
COMMUNITY

## 2021-01-01 RX ORDER — CARBOXYMETHYLCELLULOSE SODIUM 5 MG/ML
1 SOLUTION/ DROPS OPHTHALMIC 3 TIMES DAILY PRN
Status: DISCONTINUED | OUTPATIENT
Start: 2021-01-01 | End: 2021-01-01 | Stop reason: HOSPADM

## 2021-01-01 RX ORDER — SODIUM CHLORIDE 0.9 % (FLUSH) 0.9 %
10 SYRINGE (ML) INJECTION AS NEEDED
Status: DISCONTINUED | OUTPATIENT
Start: 2021-01-01 | End: 2021-01-01

## 2021-01-01 RX ORDER — ACETAMINOPHEN 325 MG/1
650 TABLET ORAL EVERY 6 HOURS PRN
Status: DISCONTINUED | OUTPATIENT
Start: 2021-01-01 | End: 2021-01-01

## 2021-01-01 RX ORDER — SODIUM CHLORIDE 9 MG/ML
30 INJECTION, SOLUTION INTRAVENOUS CONTINUOUS PRN
Status: DISCONTINUED | OUTPATIENT
Start: 2021-01-01 | End: 2021-01-01

## 2021-01-01 RX ORDER — POLYETHYLENE GLYCOL 3350 17 G/17G
17 POWDER, FOR SOLUTION ORAL DAILY
Status: DISCONTINUED | OUTPATIENT
Start: 2021-01-01 | End: 2021-01-01

## 2021-01-01 RX ORDER — DEXTROSE AND SODIUM CHLORIDE 5; .9 G/100ML; G/100ML
150 INJECTION, SOLUTION INTRAVENOUS CONTINUOUS PRN
Status: DISCONTINUED | OUTPATIENT
Start: 2021-01-01 | End: 2021-01-01

## 2021-01-01 RX ORDER — SODIUM CHLORIDE 0.9 % (FLUSH) 0.9 %
10 SYRINGE (ML) INJECTION EVERY 12 HOURS SCHEDULED
Status: DISCONTINUED | OUTPATIENT
Start: 2021-01-01 | End: 2021-01-01 | Stop reason: HOSPADM

## 2021-01-01 RX ORDER — NICOTINE POLACRILEX 4 MG
15 LOZENGE BUCCAL
Status: DISCONTINUED | OUTPATIENT
Start: 2021-01-01 | End: 2021-01-01

## 2021-01-01 RX ORDER — FENTANYL CITRATE/PF 100MCG/2ML
SYRINGE (ML) INTRAVENOUS
Status: DISCONTINUED | OUTPATIENT
Start: 2021-01-01 | End: 2021-01-01

## 2021-01-01 RX ORDER — DEXTROSE MONOHYDRATE 25 G/50ML
25 INJECTION, SOLUTION INTRAVENOUS
Status: DISCONTINUED | OUTPATIENT
Start: 2021-01-01 | End: 2021-01-01

## 2021-01-01 RX ORDER — SODIUM CHLORIDE AND POTASSIUM CHLORIDE 150; 900 MG/100ML; MG/100ML
250 INJECTION, SOLUTION INTRAVENOUS CONTINUOUS PRN
Status: DISCONTINUED | OUTPATIENT
Start: 2021-01-01 | End: 2021-01-01

## 2021-01-01 RX ORDER — VENLAFAXINE HYDROCHLORIDE 37.5 MG/1
37.5 CAPSULE, EXTENDED RELEASE ORAL DAILY
Status: CANCELLED | OUTPATIENT
Start: 2021-01-01

## 2021-01-01 RX ORDER — VORICONAZOLE 200 MG/1
200 TABLET, FILM COATED ORAL EVERY 12 HOURS
Status: DISCONTINUED | OUTPATIENT
Start: 2021-01-01 | End: 2021-01-01

## 2021-01-01 RX ORDER — MORPHINE SULFATE 2 MG/ML
2 INJECTION, SOLUTION INTRAMUSCULAR; INTRAVENOUS
Status: DISCONTINUED | OUTPATIENT
Start: 2021-01-01 | End: 2021-01-01

## 2021-01-01 RX ORDER — FENTANYL CITRATE 50 UG/ML
50 INJECTION, SOLUTION INTRAMUSCULAR; INTRAVENOUS
Status: DISPENSED | OUTPATIENT
Start: 2021-01-01 | End: 2021-01-01

## 2021-01-01 RX ORDER — SODIUM CHLORIDE 9 MG/ML
250 INJECTION, SOLUTION INTRAVENOUS CONTINUOUS PRN
Status: DISCONTINUED | OUTPATIENT
Start: 2021-01-01 | End: 2021-01-01

## 2021-01-01 RX ORDER — CASTOR OIL AND BALSAM, PERU 788; 87 MG/G; MG/G
1 OINTMENT TOPICAL EVERY 12 HOURS SCHEDULED
Status: DISCONTINUED | OUTPATIENT
Start: 2021-01-01 | End: 2021-01-01

## 2021-01-01 RX ORDER — DOXYCYCLINE HYCLATE 100 MG/1
100 CAPSULE ORAL 2 TIMES DAILY
COMMUNITY
Start: 2021-01-01 | End: 2021-01-01

## 2021-01-01 RX ORDER — DEXTROSE MONOHYDRATE 25 G/50ML
12.5 INJECTION, SOLUTION INTRAVENOUS AS NEEDED
Status: DISCONTINUED | OUTPATIENT
Start: 2021-01-01 | End: 2021-01-01

## 2021-01-01 RX ORDER — DEXAMETHASONE SODIUM PHOSPHATE 10 MG/ML
6 INJECTION INTRAMUSCULAR; INTRAVENOUS EVERY 24 HOURS
Status: DISCONTINUED | OUTPATIENT
Start: 2021-01-01 | End: 2021-01-01

## 2021-01-01 RX ORDER — PHENYLEPHRINE HCL IN 0.9% NACL 1 MG/10 ML
2 SYRINGE (ML) INTRAVENOUS
Status: DISCONTINUED | OUTPATIENT
Start: 2021-01-01 | End: 2021-01-01

## 2021-01-01 RX ORDER — MORPHINE SULFATE 2 MG/ML
2 INJECTION, SOLUTION INTRAMUSCULAR; INTRAVENOUS
Status: DISCONTINUED | OUTPATIENT
Start: 2021-01-01 | End: 2021-01-01 | Stop reason: HOSPADM

## 2021-01-01 RX ORDER — DEXAMETHASONE SODIUM PHOSPHATE 4 MG/ML
6 INJECTION, SOLUTION INTRA-ARTICULAR; INTRALESIONAL; INTRAMUSCULAR; INTRAVENOUS; SOFT TISSUE EVERY 12 HOURS
Status: DISCONTINUED | OUTPATIENT
Start: 2021-01-01 | End: 2021-01-01

## 2021-01-01 RX ORDER — DEXTROSE, SODIUM CHLORIDE, AND POTASSIUM CHLORIDE 5; .9; .15 G/100ML; G/100ML; G/100ML
150 INJECTION INTRAVENOUS CONTINUOUS PRN
Status: DISCONTINUED | OUTPATIENT
Start: 2021-01-01 | End: 2021-01-01

## 2021-01-01 RX ORDER — SODIUM CHLORIDE 450 MG/100ML
75 INJECTION, SOLUTION INTRAVENOUS CONTINUOUS
Status: DISCONTINUED | OUTPATIENT
Start: 2021-01-01 | End: 2021-01-01

## 2021-01-01 RX ORDER — HYDROMORPHONE HYDROCHLORIDE 1 MG/ML
0.5 INJECTION, SOLUTION INTRAMUSCULAR; INTRAVENOUS; SUBCUTANEOUS EVERY 4 HOURS PRN
Status: DISPENSED | OUTPATIENT
Start: 2021-01-01 | End: 2021-01-01

## 2021-01-01 RX ORDER — ALBUTEROL SULFATE 90 UG/1
2 AEROSOL, METERED RESPIRATORY (INHALATION) EVERY 6 HOURS PRN
Status: DISCONTINUED | OUTPATIENT
Start: 2021-01-01 | End: 2021-01-01

## 2021-01-01 RX ORDER — HEPARIN SODIUM 5000 [USP'U]/ML
5000 INJECTION, SOLUTION INTRAVENOUS; SUBCUTANEOUS AS NEEDED
Status: DISCONTINUED | OUTPATIENT
Start: 2021-01-01 | End: 2021-01-01

## 2021-01-01 RX ORDER — DEXAMETHASONE SODIUM PHOSPHATE 10 MG/ML
6 INJECTION INTRAMUSCULAR; INTRAVENOUS EVERY 12 HOURS
Status: DISCONTINUED | OUTPATIENT
Start: 2021-01-01 | End: 2021-01-01

## 2021-01-01 RX ORDER — VECURONIUM BROMIDE 1 MG/ML
100 INJECTION, POWDER, LYOPHILIZED, FOR SOLUTION INTRAVENOUS ONCE
Status: COMPLETED | OUTPATIENT
Start: 2021-01-01 | End: 2021-01-01

## 2021-01-01 RX ORDER — FENTANYL CITRATE/PF 100MCG/2ML
SYRINGE (ML) INTRAVENOUS
Status: COMPLETED
Start: 2021-01-01 | End: 2021-01-01

## 2021-01-01 RX ORDER — POTASSIUM CHLORIDE, DEXTROSE MONOHYDRATE AND SODIUM CHLORIDE 300; 5; 900 MG/100ML; G/100ML; MG/100ML
150 INJECTION, SOLUTION INTRAVENOUS CONTINUOUS PRN
Status: DISCONTINUED | OUTPATIENT
Start: 2021-01-01 | End: 2021-01-01

## 2021-01-01 RX ORDER — MAGNESIUM SULFATE HEPTAHYDRATE 40 MG/ML
4 INJECTION, SOLUTION INTRAVENOUS ONCE
Status: COMPLETED | OUTPATIENT
Start: 2021-01-01 | End: 2021-01-01

## 2021-01-01 RX ORDER — VECURONIUM BROMIDE 1 MG/ML
5 INJECTION, POWDER, LYOPHILIZED, FOR SOLUTION INTRAVENOUS ONCE
Status: DISCONTINUED | OUTPATIENT
Start: 2021-01-01 | End: 2021-01-01

## 2021-01-01 RX ORDER — SODIUM CHLORIDE 0.9 % (FLUSH) 0.9 %
10 SYRINGE (ML) INJECTION AS NEEDED
Status: DISCONTINUED | OUTPATIENT
Start: 2021-01-01 | End: 2021-01-01 | Stop reason: HOSPADM

## 2021-01-01 RX ORDER — DEXTROSE MONOHYDRATE 25 G/50ML
25 INJECTION, SOLUTION INTRAVENOUS
Status: CANCELLED | OUTPATIENT
Start: 2021-01-01

## 2021-01-01 RX ORDER — HEPARIN SODIUM 5000 [USP'U]/ML
2500 INJECTION, SOLUTION INTRAVENOUS; SUBCUTANEOUS AS NEEDED
Status: DISCONTINUED | OUTPATIENT
Start: 2021-01-01 | End: 2021-01-01

## 2021-01-01 RX ORDER — ATORVASTATIN CALCIUM 20 MG/1
20 TABLET, FILM COATED ORAL NIGHTLY
Status: DISCONTINUED | OUTPATIENT
Start: 2021-01-01 | End: 2021-01-01

## 2021-01-01 RX ORDER — VECURONIUM BROMIDE 1 MG/ML
8 INJECTION, POWDER, LYOPHILIZED, FOR SOLUTION INTRAVENOUS AS NEEDED
Status: DISCONTINUED | OUTPATIENT
Start: 2021-01-01 | End: 2021-01-01

## 2021-01-01 RX ORDER — LINEZOLID 2 MG/ML
600 INJECTION, SOLUTION INTRAVENOUS EVERY 12 HOURS
Status: DISCONTINUED | OUTPATIENT
Start: 2021-01-01 | End: 2021-01-01

## 2021-01-01 RX ORDER — SODIUM BICARBONATE 650 MG/1
1300 TABLET ORAL 3 TIMES DAILY
Status: DISCONTINUED | OUTPATIENT
Start: 2021-01-01 | End: 2021-01-01

## 2021-01-01 RX ORDER — SODIUM CHLORIDE 0.9 % (FLUSH) 0.9 %
3 SYRINGE (ML) INJECTION EVERY 12 HOURS SCHEDULED
Status: DISCONTINUED | OUTPATIENT
Start: 2021-01-01 | End: 2021-01-01

## 2021-01-01 RX ORDER — MAGNESIUM SULFATE HEPTAHYDRATE 40 MG/ML
4 INJECTION, SOLUTION INTRAVENOUS AS NEEDED
Status: DISCONTINUED | OUTPATIENT
Start: 2021-01-01 | End: 2021-01-01

## 2021-01-01 RX ORDER — ALBUMIN (HUMAN) 12.5 G/50ML
25 SOLUTION INTRAVENOUS AS NEEDED
Status: DISCONTINUED | OUTPATIENT
Start: 2021-01-01 | End: 2021-01-01

## 2021-01-01 RX ORDER — HEPARIN SODIUM 10000 [USP'U]/100ML
9.8 INJECTION, SOLUTION INTRAVENOUS
Status: DISCONTINUED | OUTPATIENT
Start: 2021-01-01 | End: 2021-01-01

## 2021-01-01 RX ORDER — PANTOPRAZOLE SODIUM 40 MG/10ML
40 INJECTION, POWDER, LYOPHILIZED, FOR SOLUTION INTRAVENOUS EVERY 12 HOURS SCHEDULED
Status: DISCONTINUED | OUTPATIENT
Start: 2021-01-01 | End: 2021-01-01 | Stop reason: HOSPADM

## 2021-01-01 RX ORDER — HEPARIN SODIUM 10000 [USP'U]/100ML
10 INJECTION, SOLUTION INTRAVENOUS
Status: DISCONTINUED | OUTPATIENT
Start: 2021-01-01 | End: 2021-01-01

## 2021-01-01 RX ORDER — VENLAFAXINE HYDROCHLORIDE 37.5 MG/1
37.5 CAPSULE, EXTENDED RELEASE ORAL DAILY
COMMUNITY

## 2021-01-01 RX ORDER — POTASSIUM CHLORIDE 7.45 MG/ML
10 INJECTION INTRAVENOUS
Status: DISCONTINUED | OUTPATIENT
Start: 2021-01-01 | End: 2021-01-01

## 2021-01-01 RX ORDER — HYDROMORPHONE HYDROCHLORIDE 1 MG/ML
0.25 INJECTION, SOLUTION INTRAMUSCULAR; INTRAVENOUS; SUBCUTANEOUS
Status: DISPENSED | OUTPATIENT
Start: 2021-01-01 | End: 2021-01-01

## 2021-01-01 RX ORDER — MAGNESIUM SULFATE HEPTAHYDRATE 40 MG/ML
2 INJECTION, SOLUTION INTRAVENOUS ONCE
Status: COMPLETED | OUTPATIENT
Start: 2021-01-01 | End: 2021-01-01

## 2021-01-01 RX ORDER — LORAZEPAM 1 MG/1
1 TABLET ORAL EVERY 8 HOURS PRN
Qty: 10 TABLET | Refills: 0 | Status: ON HOLD | OUTPATIENT
Start: 2021-01-01 | End: 2021-01-01

## 2021-01-01 RX ORDER — NICOTINE POLACRILEX 4 MG
15 LOZENGE BUCCAL
Status: CANCELLED | OUTPATIENT
Start: 2021-01-01

## 2021-01-01 RX ORDER — SUCCINYLCHOLINE CHLORIDE 20 MG/ML
100 INJECTION INTRAMUSCULAR; INTRAVENOUS ONCE
Status: COMPLETED | OUTPATIENT
Start: 2021-01-01 | End: 2021-01-01

## 2021-01-01 RX ORDER — SODIUM CHLORIDE 0.9 % (FLUSH) 0.9 %
30 SYRINGE (ML) INJECTION ONCE AS NEEDED
Status: DISCONTINUED | OUTPATIENT
Start: 2021-01-01 | End: 2021-01-01

## 2021-01-01 RX ORDER — SODIUM CHLORIDE AND POTASSIUM CHLORIDE 150; 450 MG/100ML; MG/100ML
250 INJECTION, SOLUTION INTRAVENOUS CONTINUOUS PRN
Status: DISCONTINUED | OUTPATIENT
Start: 2021-01-01 | End: 2021-01-01

## 2021-01-01 RX ORDER — SODIUM CHLORIDE 9 MG/ML
10 INJECTION, SOLUTION INTRAVENOUS CONTINUOUS PRN
Status: DISCONTINUED | OUTPATIENT
Start: 2021-01-01 | End: 2021-01-01

## 2021-01-01 RX ORDER — MORPHINE SULFATE/0.9% NACL/PF 1 MG/ML
SYRINGE (ML) INJECTION CONTINUOUS
Status: DISCONTINUED | OUTPATIENT
Start: 2021-01-01 | End: 2021-01-01 | Stop reason: HOSPADM

## 2021-01-01 RX ADMIN — PANTOPRAZOLE SODIUM 40 MG: 40 INJECTION, POWDER, FOR SOLUTION INTRAVENOUS at 21:27

## 2021-01-01 RX ADMIN — DEXAMETHASONE SODIUM PHOSPHATE 6 MG: 10 INJECTION INTRAMUSCULAR; INTRAVENOUS at 06:33

## 2021-01-01 RX ADMIN — MINERAL OIL AND PETROLATUM: 150; 830 OINTMENT OPHTHALMIC at 00:01

## 2021-01-01 RX ADMIN — PROPOFOL 50 MCG/KG/MIN: 10 INJECTION, EMULSION INTRAVENOUS at 04:52

## 2021-01-01 RX ADMIN — SODIUM CHLORIDE, PRESERVATIVE FREE 10 ML: 5 INJECTION INTRAVENOUS at 20:13

## 2021-01-01 RX ADMIN — Medication: at 09:05

## 2021-01-01 RX ADMIN — NOREPINEPHRINE BITARTRATE 0.02 MCG/KG/MIN: 1 INJECTION INTRAVENOUS at 02:50

## 2021-01-01 RX ADMIN — PROPOFOL 50 MCG/KG/MIN: 10 INJECTION, EMULSION INTRAVENOUS at 14:42

## 2021-01-01 RX ADMIN — PROPOFOL 50 MCG/KG/MIN: 10 INJECTION, EMULSION INTRAVENOUS at 02:24

## 2021-01-01 RX ADMIN — MINERAL OIL AND PETROLATUM: 150; 830 OINTMENT OPHTHALMIC at 04:32

## 2021-01-01 RX ADMIN — DEXTROSE MONOHYDRATE 25 G: 25 INJECTION, SOLUTION INTRAVENOUS at 17:39

## 2021-01-01 RX ADMIN — SODIUM BICARBONATE TAB 650 MG 1300 MG: 650 TAB at 22:18

## 2021-01-01 RX ADMIN — REMDESIVIR 100 MG: 100 INJECTION, POWDER, LYOPHILIZED, FOR SOLUTION INTRAVENOUS at 09:13

## 2021-01-01 RX ADMIN — INSULIN ASPART 5 UNITS: 100 INJECTION, SOLUTION INTRAVENOUS; SUBCUTANEOUS at 17:43

## 2021-01-01 RX ADMIN — SODIUM CHLORIDE, PRESERVATIVE FREE 10 ML: 5 INJECTION INTRAVENOUS at 20:46

## 2021-01-01 RX ADMIN — DOCUSATE SODIUM 50 MG AND SENNOSIDES 8.6 MG 1 TABLET: 8.6; 5 TABLET, FILM COATED ORAL at 21:27

## 2021-01-01 RX ADMIN — CASTOR OIL AND BALSAM, PERU 1 APPLICATION: 788; 87 OINTMENT TOPICAL at 08:34

## 2021-01-01 RX ADMIN — CHLORHEXIDINE GLUCONATE 15 ML: 1.2 RINSE ORAL at 20:13

## 2021-01-01 RX ADMIN — VECURONIUM BROMIDE 1.2 MCG/KG/MIN: 1 INJECTION, POWDER, LYOPHILIZED, FOR SOLUTION INTRAVENOUS at 05:23

## 2021-01-01 RX ADMIN — PANTOPRAZOLE SODIUM 40 MG: 40 INJECTION, POWDER, FOR SOLUTION INTRAVENOUS at 08:23

## 2021-01-01 RX ADMIN — SODIUM CHLORIDE, PRESERVATIVE FREE 10 ML: 5 INJECTION INTRAVENOUS at 08:02

## 2021-01-01 RX ADMIN — SODIUM CHLORIDE, PRESERVATIVE FREE 10 ML: 5 INJECTION INTRAVENOUS at 09:16

## 2021-01-01 RX ADMIN — INSULIN ASPART 3 UNITS: 100 INJECTION, SOLUTION INTRAVENOUS; SUBCUTANEOUS at 11:31

## 2021-01-01 RX ADMIN — REMDESIVIR 100 MG: 100 INJECTION, POWDER, LYOPHILIZED, FOR SOLUTION INTRAVENOUS at 06:33

## 2021-01-01 RX ADMIN — SODIUM CHLORIDE, PRESERVATIVE FREE 10 ML: 5 INJECTION INTRAVENOUS at 09:04

## 2021-01-01 RX ADMIN — MEROPENEM 500 MG: 500 INJECTION, POWDER, FOR SOLUTION INTRAVENOUS at 13:21

## 2021-01-01 RX ADMIN — INSULIN ASPART 5 UNITS: 100 INJECTION, SOLUTION INTRAVENOUS; SUBCUTANEOUS at 00:39

## 2021-01-01 RX ADMIN — PANTOPRAZOLE SODIUM 40 MG: 40 INJECTION, POWDER, FOR SOLUTION INTRAVENOUS at 08:01

## 2021-01-01 RX ADMIN — HYDROMORPHONE HYDROCHLORIDE 0.25 MG: 1 INJECTION, SOLUTION INTRAMUSCULAR; INTRAVENOUS; SUBCUTANEOUS at 02:32

## 2021-01-01 RX ADMIN — INSULIN ASPART 5 UNITS: 100 INJECTION, SOLUTION INTRAVENOUS; SUBCUTANEOUS at 11:46

## 2021-01-01 RX ADMIN — INSULIN DETEMIR 7 UNITS: 100 INJECTION, SOLUTION SUBCUTANEOUS at 08:59

## 2021-01-01 RX ADMIN — PROPOFOL 50 MCG/KG/MIN: 10 INJECTION, EMULSION INTRAVENOUS at 09:31

## 2021-01-01 RX ADMIN — Medication: at 19:35

## 2021-01-01 RX ADMIN — CHLORHEXIDINE GLUCONATE 15 ML: 1.2 RINSE ORAL at 21:40

## 2021-01-01 RX ADMIN — CHLORHEXIDINE GLUCONATE 15 ML: 1.2 RINSE ORAL at 22:19

## 2021-01-01 RX ADMIN — IOPAMIDOL 60 ML: 755 INJECTION, SOLUTION INTRAVENOUS at 11:05

## 2021-01-01 RX ADMIN — DEXMEDETOMIDINE 1.5 MCG/KG/HR: 100 INJECTION, SOLUTION, CONCENTRATE INTRAVENOUS at 15:03

## 2021-01-01 RX ADMIN — Medication 10 MG/HR: at 04:32

## 2021-01-01 RX ADMIN — DEXAMETHASONE SODIUM PHOSPHATE 6 MG: 4 INJECTION, SOLUTION INTRA-ARTICULAR; INTRALESIONAL; INTRAMUSCULAR; INTRAVENOUS; SOFT TISSUE at 17:09

## 2021-01-01 RX ADMIN — MICAFUNGIN SODIUM 100 MG: 100 INJECTION, POWDER, LYOPHILIZED, FOR SOLUTION INTRAVENOUS at 15:26

## 2021-01-01 RX ADMIN — SODIUM CHLORIDE 1000 ML: 9 INJECTION, SOLUTION INTRAVENOUS at 13:15

## 2021-01-01 RX ADMIN — DEXAMETHASONE SODIUM PHOSPHATE 6 MG: 4 INJECTION, SOLUTION INTRA-ARTICULAR; INTRALESIONAL; INTRAMUSCULAR; INTRAVENOUS; SOFT TISSUE at 03:54

## 2021-01-01 RX ADMIN — ATORVASTATIN CALCIUM 20 MG: 20 TABLET, FILM COATED ORAL at 20:13

## 2021-01-01 RX ADMIN — FENTANYL CITRATE: 50 INJECTION, SOLUTION INTRAMUSCULAR; INTRAVENOUS at 00:35

## 2021-01-01 RX ADMIN — PROPOFOL 50 MCG/KG/MIN: 10 INJECTION, EMULSION INTRAVENOUS at 00:48

## 2021-01-01 RX ADMIN — INSULIN HUMAN 15 UNITS/HR: 1 INJECTION, SOLUTION INTRAVENOUS at 00:42

## 2021-01-01 RX ADMIN — CASTOR OIL AND BALSAM, PERU 1 APPLICATION: 788; 87 OINTMENT TOPICAL at 19:46

## 2021-01-01 RX ADMIN — PROPOFOL 45 MCG/KG/MIN: 10 INJECTION, EMULSION INTRAVENOUS at 18:14

## 2021-01-01 RX ADMIN — CHLORHEXIDINE GLUCONATE 15 ML: 1.2 RINSE ORAL at 08:23

## 2021-01-01 RX ADMIN — MINERAL OIL AND PETROLATUM: 150; 830 OINTMENT OPHTHALMIC at 20:30

## 2021-01-01 RX ADMIN — SODIUM CHLORIDE, PRESERVATIVE FREE 10 ML: 5 INJECTION INTRAVENOUS at 07:54

## 2021-01-01 RX ADMIN — HEPARIN SODIUM 20.8 UNITS/KG/HR: 10000 INJECTION, SOLUTION INTRAVENOUS at 13:03

## 2021-01-01 RX ADMIN — ASPIRIN 81 MG: 81 TABLET, COATED ORAL at 11:39

## 2021-01-01 RX ADMIN — PROPOFOL 45 MCG/KG/MIN: 10 INJECTION, EMULSION INTRAVENOUS at 22:45

## 2021-01-01 RX ADMIN — NYSTATIN 1 APPLICATION: 100000 POWDER TOPICAL at 07:56

## 2021-01-01 RX ADMIN — NYSTATIN: 100000 POWDER TOPICAL at 08:01

## 2021-01-01 RX ADMIN — VECURONIUM BROMIDE 8 MG: 1 INJECTION, POWDER, LYOPHILIZED, FOR SOLUTION INTRAVENOUS at 00:26

## 2021-01-01 RX ADMIN — MINERAL OIL AND PETROLATUM: 150; 830 OINTMENT OPHTHALMIC at 21:39

## 2021-01-01 RX ADMIN — MORPHINE SULFATE 2 MG: 2 INJECTION, SOLUTION INTRAMUSCULAR; INTRAVENOUS at 15:10

## 2021-01-01 RX ADMIN — POTASSIUM CHLORIDE AND SODIUM CHLORIDE 250 ML/HR: 450; 150 INJECTION, SOLUTION INTRAVENOUS at 06:52

## 2021-01-01 RX ADMIN — VANCOMYCIN HYDROCHLORIDE 1000 MG: 1 INJECTION, POWDER, LYOPHILIZED, FOR SOLUTION INTRAVENOUS at 17:59

## 2021-01-01 RX ADMIN — CHLORHEXIDINE GLUCONATE 15 ML: 1.2 RINSE ORAL at 19:50

## 2021-01-01 RX ADMIN — PROPOFOL 25 MCG/KG/MIN: 10 INJECTION, EMULSION INTRAVENOUS at 01:35

## 2021-01-01 RX ADMIN — MEROPENEM 500 MG: 500 INJECTION, POWDER, FOR SOLUTION INTRAVENOUS at 12:55

## 2021-01-01 RX ADMIN — INSULIN DETEMIR 20 UNITS: 100 INJECTION, SOLUTION SUBCUTANEOUS at 20:42

## 2021-01-01 RX ADMIN — MEROPENEM 500 MG: 500 INJECTION, POWDER, FOR SOLUTION INTRAVENOUS at 16:42

## 2021-01-01 RX ADMIN — SODIUM BICARBONATE TAB 650 MG 650 MG: 650 TAB at 18:25

## 2021-01-01 RX ADMIN — SODIUM BICARBONATE TAB 650 MG 1300 MG: 650 TAB at 17:09

## 2021-01-01 RX ADMIN — INSULIN DETEMIR 30 UNITS: 100 INJECTION, SOLUTION SUBCUTANEOUS at 08:30

## 2021-01-01 RX ADMIN — PROPOFOL 50 MCG/KG/MIN: 10 INJECTION, EMULSION INTRAVENOUS at 19:36

## 2021-01-01 RX ADMIN — SODIUM CHLORIDE 2 G: 9 INJECTION, SOLUTION INTRAVENOUS at 12:21

## 2021-01-01 RX ADMIN — MEROPENEM 500 MG: 500 INJECTION, POWDER, FOR SOLUTION INTRAVENOUS at 14:39

## 2021-01-01 RX ADMIN — PROPOFOL 50 MCG/KG/MIN: 10 INJECTION, EMULSION INTRAVENOUS at 09:15

## 2021-01-01 RX ADMIN — FENTANYL CITRATE: 50 INJECTION INTRAVENOUS at 08:30

## 2021-01-01 RX ADMIN — INSULIN HUMAN 15 UNITS/HR: 1 INJECTION, SOLUTION INTRAVENOUS at 07:01

## 2021-01-01 RX ADMIN — CASTOR OIL AND BALSAM, PERU 1 APPLICATION: 788; 87 OINTMENT TOPICAL at 09:15

## 2021-01-01 RX ADMIN — MICAFUNGIN SODIUM 100 MG: 100 INJECTION, POWDER, LYOPHILIZED, FOR SOLUTION INTRAVENOUS at 12:04

## 2021-01-01 RX ADMIN — PIPERACILLIN SODIUM AND TAZOBACTAM SODIUM 3.38 G: 3; .375 INJECTION, POWDER, LYOPHILIZED, FOR SOLUTION INTRAVENOUS at 04:13

## 2021-01-01 RX ADMIN — INSULIN ASPART 3 UNITS: 100 INJECTION, SOLUTION INTRAVENOUS; SUBCUTANEOUS at 11:41

## 2021-01-01 RX ADMIN — DOXYCYCLINE 100 MG: 100 INJECTION, POWDER, LYOPHILIZED, FOR SOLUTION INTRAVENOUS at 04:54

## 2021-01-01 RX ADMIN — Medication 95.3 MG: at 15:46

## 2021-01-01 RX ADMIN — MINERAL OIL AND PETROLATUM: 150; 830 OINTMENT OPHTHALMIC at 04:09

## 2021-01-01 RX ADMIN — SODIUM CHLORIDE, PRESERVATIVE FREE 10 ML: 5 INJECTION INTRAVENOUS at 08:05

## 2021-01-01 RX ADMIN — PROPOFOL 50 MCG/KG/MIN: 10 INJECTION, EMULSION INTRAVENOUS at 02:49

## 2021-01-01 RX ADMIN — FENTANYL CITRATE 50 MCG: 50 INJECTION INTRAMUSCULAR; INTRAVENOUS at 13:04

## 2021-01-01 RX ADMIN — SODIUM CHLORIDE, PRESERVATIVE FREE 10 ML: 5 INJECTION INTRAVENOUS at 21:15

## 2021-01-01 RX ADMIN — VECURONIUM BROMIDE 1.2 MCG/KG/MIN: 1 INJECTION, POWDER, LYOPHILIZED, FOR SOLUTION INTRAVENOUS at 17:48

## 2021-01-01 RX ADMIN — DEXTROSE MONOHYDRATE 10 ML: 500 INJECTION PARENTERAL at 23:57

## 2021-01-01 RX ADMIN — DEXAMETHASONE SODIUM PHOSPHATE 6 MG: 10 INJECTION INTRAMUSCULAR; INTRAVENOUS at 06:39

## 2021-01-01 RX ADMIN — CLINDAMYCIN IN 5 PERCENT DEXTROSE 600 MG: 12 INJECTION, SOLUTION INTRAVENOUS at 09:36

## 2021-01-01 RX ADMIN — SODIUM CHLORIDE, PRESERVATIVE FREE 10 ML: 5 INJECTION INTRAVENOUS at 21:40

## 2021-01-01 RX ADMIN — CHLORHEXIDINE GLUCONATE 15 ML: 1.2 RINSE ORAL at 09:02

## 2021-01-01 RX ADMIN — DEXTROSE MONOHYDRATE 15 ML: 500 INJECTION PARENTERAL at 19:55

## 2021-01-01 RX ADMIN — DEXMEDETOMIDINE 1.5 MCG/KG/HR: 100 INJECTION, SOLUTION, CONCENTRATE INTRAVENOUS at 03:39

## 2021-01-01 RX ADMIN — POLYETHYLENE GLYCOL (3350) 17 G: 17 POWDER, FOR SOLUTION ORAL at 08:26

## 2021-01-01 RX ADMIN — PROPOFOL 50 MCG/KG/MIN: 10 INJECTION, EMULSION INTRAVENOUS at 11:38

## 2021-01-01 RX ADMIN — Medication 10 MG/HR: at 13:25

## 2021-01-01 RX ADMIN — SODIUM BICARBONATE TAB 650 MG 1300 MG: 650 TAB at 16:55

## 2021-01-01 RX ADMIN — INSULIN ASPART 3 UNITS: 100 INJECTION, SOLUTION INTRAVENOUS; SUBCUTANEOUS at 00:40

## 2021-01-01 RX ADMIN — SODIUM BICARBONATE TAB 650 MG 1300 MG: 650 TAB at 09:02

## 2021-01-01 RX ADMIN — PROPOFOL 50 MCG/KG/MIN: 10 INJECTION, EMULSION INTRAVENOUS at 20:12

## 2021-01-01 RX ADMIN — SODIUM CHLORIDE, PRESERVATIVE FREE 10 ML: 5 INJECTION INTRAVENOUS at 21:31

## 2021-01-01 RX ADMIN — ASPIRIN 81 MG: 81 TABLET, COATED ORAL at 11:01

## 2021-01-01 RX ADMIN — INSULIN ASPART 5 UNITS: 100 INJECTION, SOLUTION INTRAVENOUS; SUBCUTANEOUS at 11:07

## 2021-01-01 RX ADMIN — ALBUMIN HUMAN 25 G: 0.25 SOLUTION INTRAVENOUS at 17:10

## 2021-01-01 RX ADMIN — DOCUSATE SODIUM 50 MG AND SENNOSIDES 8.6 MG 1 TABLET: 8.6; 5 TABLET, FILM COATED ORAL at 09:36

## 2021-01-01 RX ADMIN — Medication: at 22:03

## 2021-01-01 RX ADMIN — SODIUM BICARBONATE TAB 650 MG 1300 MG: 650 TAB at 09:36

## 2021-01-01 RX ADMIN — HEPARIN SODIUM 13.8 UNITS/KG/HR: 10000 INJECTION, SOLUTION INTRAVENOUS at 10:01

## 2021-01-01 RX ADMIN — PANTOPRAZOLE SODIUM 40 MG: 40 INJECTION, POWDER, FOR SOLUTION INTRAVENOUS at 21:41

## 2021-01-01 RX ADMIN — PROPOFOL 50 MCG/KG/MIN: 10 INJECTION, EMULSION INTRAVENOUS at 05:57

## 2021-01-01 RX ADMIN — Medication: at 02:11

## 2021-01-01 RX ADMIN — VORICONAZOLE 200 MG: 200 TABLET ORAL at 05:54

## 2021-01-01 RX ADMIN — SODIUM CHLORIDE 75 ML/HR: 4.5 INJECTION, SOLUTION INTRAVENOUS at 08:31

## 2021-01-01 RX ADMIN — SUCCINYLCHOLINE CHLORIDE 100 MG: 20 INJECTION, SOLUTION INTRAMUSCULAR; INTRAVENOUS at 15:45

## 2021-01-01 RX ADMIN — NYSTATIN: 100000 POWDER TOPICAL at 20:11

## 2021-01-01 RX ADMIN — MORPHINE SULFATE 2 MG: 2 INJECTION, SOLUTION INTRAMUSCULAR; INTRAVENOUS at 14:58

## 2021-01-01 RX ADMIN — MICAFUNGIN SODIUM 100 MG: 100 INJECTION, POWDER, LYOPHILIZED, FOR SOLUTION INTRAVENOUS at 13:30

## 2021-01-01 RX ADMIN — PANTOPRAZOLE SODIUM 40 MG: 40 INJECTION, POWDER, FOR SOLUTION INTRAVENOUS at 22:17

## 2021-01-01 RX ADMIN — DEXTROSE MONOHYDRATE 25 G: 500 INJECTION PARENTERAL at 03:51

## 2021-01-01 RX ADMIN — CHLORHEXIDINE GLUCONATE 15 ML: 1.2 RINSE ORAL at 08:03

## 2021-01-01 RX ADMIN — Medication: at 23:45

## 2021-01-01 RX ADMIN — PROPOFOL 45 MCG/KG/MIN: 10 INJECTION, EMULSION INTRAVENOUS at 05:08

## 2021-01-01 RX ADMIN — DEXMEDETOMIDINE 1.5 MCG/KG/HR: 100 INJECTION, SOLUTION, CONCENTRATE INTRAVENOUS at 22:55

## 2021-01-01 RX ADMIN — PANTOPRAZOLE SODIUM 40 MG: 40 INJECTION, POWDER, FOR SOLUTION INTRAVENOUS at 09:18

## 2021-01-01 RX ADMIN — MEROPENEM 500 MG: 500 INJECTION, POWDER, FOR SOLUTION INTRAVENOUS at 13:17

## 2021-01-01 RX ADMIN — POTASSIUM CHLORIDE AND SODIUM CHLORIDE 250 ML/HR: 450; 150 INJECTION, SOLUTION INTRAVENOUS at 17:45

## 2021-01-01 RX ADMIN — VECURONIUM BROMIDE 1 MCG/KG/MIN: 1 INJECTION, POWDER, LYOPHILIZED, FOR SOLUTION INTRAVENOUS at 13:18

## 2021-01-01 RX ADMIN — MINERAL OIL AND PETROLATUM: 150; 830 OINTMENT OPHTHALMIC at 11:32

## 2021-01-01 RX ADMIN — DEXMEDETOMIDINE 1.5 MCG/KG/HR: 100 INJECTION, SOLUTION, CONCENTRATE INTRAVENOUS at 01:16

## 2021-01-01 RX ADMIN — VECURONIUM BROMIDE 8 MG: 1 INJECTION, POWDER, LYOPHILIZED, FOR SOLUTION INTRAVENOUS at 12:44

## 2021-01-01 RX ADMIN — Medication: at 13:59

## 2021-01-01 RX ADMIN — NYSTATIN: 100000 POWDER TOPICAL at 21:39

## 2021-01-01 RX ADMIN — PANTOPRAZOLE SODIUM 40 MG: 40 INJECTION, POWDER, FOR SOLUTION INTRAVENOUS at 09:36

## 2021-01-01 RX ADMIN — SODIUM CHLORIDE, PRESERVATIVE FREE 10 ML: 5 INJECTION INTRAVENOUS at 20:24

## 2021-01-01 RX ADMIN — SODIUM BICARBONATE TAB 650 MG 1300 MG: 650 TAB at 17:12

## 2021-01-01 RX ADMIN — PROPOFOL 50 MCG/KG/MIN: 10 INJECTION, EMULSION INTRAVENOUS at 23:15

## 2021-01-01 RX ADMIN — CHLORHEXIDINE GLUCONATE 15 ML: 1.2 RINSE ORAL at 07:55

## 2021-01-01 RX ADMIN — DEXAMETHASONE SODIUM PHOSPHATE 6 MG: 4 INJECTION, SOLUTION INTRA-ARTICULAR; INTRALESIONAL; INTRAMUSCULAR; INTRAVENOUS; SOFT TISSUE at 16:35

## 2021-01-01 RX ADMIN — VASOPRESSIN 0.04 UNITS/MIN: 20 INJECTION INTRAVENOUS at 19:02

## 2021-01-01 RX ADMIN — DEXMEDETOMIDINE 1.5 MCG/KG/HR: 100 INJECTION, SOLUTION, CONCENTRATE INTRAVENOUS at 08:05

## 2021-01-01 RX ADMIN — SODIUM BICARBONATE TAB 650 MG 1300 MG: 650 TAB at 21:39

## 2021-01-01 RX ADMIN — SODIUM CHLORIDE, PRESERVATIVE FREE 10 ML: 5 INJECTION INTRAVENOUS at 00:25

## 2021-01-01 RX ADMIN — Medication 10 MG/HR: at 13:59

## 2021-01-01 RX ADMIN — DEXAMETHASONE SODIUM PHOSPHATE 6 MG: 4 INJECTION, SOLUTION INTRA-ARTICULAR; INTRALESIONAL; INTRAMUSCULAR; INTRAVENOUS; SOFT TISSUE at 16:32

## 2021-01-01 RX ADMIN — DEXAMETHASONE SODIUM PHOSPHATE 6 MG: 4 INJECTION, SOLUTION INTRA-ARTICULAR; INTRALESIONAL; INTRAMUSCULAR; INTRAVENOUS; SOFT TISSUE at 06:32

## 2021-01-01 RX ADMIN — Medication: at 16:57

## 2021-01-01 RX ADMIN — SODIUM BICARBONATE 50 MEQ: 84 INJECTION INTRAVENOUS at 11:58

## 2021-01-01 RX ADMIN — DEXMEDETOMIDINE 1.5 MCG/KG/HR: 100 INJECTION, SOLUTION, CONCENTRATE INTRAVENOUS at 01:32

## 2021-01-01 RX ADMIN — HEPARIN SODIUM 5000 UNITS: 5000 INJECTION INTRAVENOUS; SUBCUTANEOUS at 16:36

## 2021-01-01 RX ADMIN — PROPOFOL 35 MCG/KG/MIN: 10 INJECTION, EMULSION INTRAVENOUS at 19:05

## 2021-01-01 RX ADMIN — PANTOPRAZOLE SODIUM 40 MG: 40 INJECTION, POWDER, FOR SOLUTION INTRAVENOUS at 20:30

## 2021-01-01 RX ADMIN — SODIUM BICARBONATE TAB 650 MG 1300 MG: 650 TAB at 17:48

## 2021-01-01 RX ADMIN — MICAFUNGIN SODIUM 100 MG: 100 INJECTION, POWDER, LYOPHILIZED, FOR SOLUTION INTRAVENOUS at 12:27

## 2021-01-01 RX ADMIN — INSULIN DETEMIR 7 UNITS: 100 INJECTION, SOLUTION SUBCUTANEOUS at 11:40

## 2021-01-01 RX ADMIN — PROPOFOL 50 MCG/KG/MIN: 10 INJECTION, EMULSION INTRAVENOUS at 14:06

## 2021-01-01 RX ADMIN — VANCOMYCIN HYDROCHLORIDE 1000 MG: 1 INJECTION, POWDER, LYOPHILIZED, FOR SOLUTION INTRAVENOUS at 16:35

## 2021-01-01 RX ADMIN — PANTOPRAZOLE SODIUM 40 MG: 40 INJECTION, POWDER, FOR SOLUTION INTRAVENOUS at 20:06

## 2021-01-01 RX ADMIN — MICAFUNGIN SODIUM 100 MG: 100 INJECTION, POWDER, LYOPHILIZED, FOR SOLUTION INTRAVENOUS at 12:54

## 2021-01-01 RX ADMIN — Medication: at 00:29

## 2021-01-01 RX ADMIN — NYSTATIN: 100000 POWDER TOPICAL at 20:23

## 2021-01-01 RX ADMIN — MINERAL OIL AND PETROLATUM: 150; 830 OINTMENT OPHTHALMIC at 00:48

## 2021-01-01 RX ADMIN — MEROPENEM 500 MG: 500 INJECTION, POWDER, FOR SOLUTION INTRAVENOUS at 02:13

## 2021-01-01 RX ADMIN — ALBUMIN HUMAN 25 G: 0.25 SOLUTION INTRAVENOUS at 18:49

## 2021-01-01 RX ADMIN — DOCUSATE SODIUM 50 MG AND SENNOSIDES 8.6 MG 1 TABLET: 8.6; 5 TABLET, FILM COATED ORAL at 08:26

## 2021-01-01 RX ADMIN — INSULIN ASPART 5 UNITS: 100 INJECTION, SOLUTION INTRAVENOUS; SUBCUTANEOUS at 21:37

## 2021-01-01 RX ADMIN — FENTANYL CITRATE: 50 INJECTION, SOLUTION INTRAMUSCULAR; INTRAVENOUS at 17:13

## 2021-01-01 RX ADMIN — PANTOPRAZOLE SODIUM 40 MG: 40 INJECTION, POWDER, FOR SOLUTION INTRAVENOUS at 21:39

## 2021-01-01 RX ADMIN — Medication: at 16:11

## 2021-01-01 RX ADMIN — MINERAL OIL AND PETROLATUM: 150; 830 OINTMENT OPHTHALMIC at 09:08

## 2021-01-01 RX ADMIN — FENTANYL CITRATE: 50 INJECTION INTRAVENOUS at 12:11

## 2021-01-01 RX ADMIN — SODIUM BICARBONATE TAB 650 MG 1300 MG: 650 TAB at 16:37

## 2021-01-01 RX ADMIN — SODIUM CHLORIDE, PRESERVATIVE FREE 10 ML: 5 INJECTION INTRAVENOUS at 09:03

## 2021-01-01 RX ADMIN — PROPOFOL 50 MCG/KG/MIN: 10 INJECTION, EMULSION INTRAVENOUS at 14:40

## 2021-01-01 RX ADMIN — SODIUM CHLORIDE, PRESERVATIVE FREE 10 ML: 5 INJECTION INTRAVENOUS at 20:04

## 2021-01-01 RX ADMIN — MEROPENEM 500 MG: 500 INJECTION, POWDER, FOR SOLUTION INTRAVENOUS at 14:44

## 2021-01-01 RX ADMIN — DOCUSATE SODIUM 50 MG AND SENNOSIDES 8.6 MG 1 TABLET: 8.6; 5 TABLET, FILM COATED ORAL at 17:12

## 2021-01-01 RX ADMIN — SODIUM CHLORIDE, PRESERVATIVE FREE 10 ML: 5 INJECTION INTRAVENOUS at 08:46

## 2021-01-01 RX ADMIN — Medication: at 12:54

## 2021-01-01 RX ADMIN — SODIUM CHLORIDE, PRESERVATIVE FREE 10 ML: 5 INJECTION INTRAVENOUS at 21:44

## 2021-01-01 RX ADMIN — PANTOPRAZOLE SODIUM 40 MG: 40 INJECTION, POWDER, FOR SOLUTION INTRAVENOUS at 08:26

## 2021-01-01 RX ADMIN — HYDROMORPHONE HYDROCHLORIDE 0.5 MG: 1 INJECTION, SOLUTION INTRAMUSCULAR; INTRAVENOUS; SUBCUTANEOUS at 03:33

## 2021-01-01 RX ADMIN — CHLORHEXIDINE GLUCONATE 15 ML: 1.2 RINSE ORAL at 20:23

## 2021-01-01 RX ADMIN — SODIUM CHLORIDE, POTASSIUM CHLORIDE, SODIUM LACTATE AND CALCIUM CHLORIDE 1000 ML: 600; 310; 30; 20 INJECTION, SOLUTION INTRAVENOUS at 12:35

## 2021-01-01 RX ADMIN — SODIUM BICARBONATE TAB 650 MG 1300 MG: 650 TAB at 17:08

## 2021-01-01 RX ADMIN — DEXMEDETOMIDINE 1.5 MCG/KG/HR: 100 INJECTION, SOLUTION, CONCENTRATE INTRAVENOUS at 22:21

## 2021-01-01 RX ADMIN — DEXTROSE MONOHYDRATE 25 G: 25 INJECTION, SOLUTION INTRAVENOUS at 04:45

## 2021-01-01 RX ADMIN — PROPOFOL 50 MCG/KG/MIN: 10 INJECTION, EMULSION INTRAVENOUS at 08:22

## 2021-01-01 RX ADMIN — ASPIRIN 81 MG: 81 TABLET, COATED ORAL at 08:02

## 2021-01-01 RX ADMIN — DEXMEDETOMIDINE 1.5 MCG/KG/HR: 100 INJECTION, SOLUTION, CONCENTRATE INTRAVENOUS at 05:41

## 2021-01-01 RX ADMIN — Medication 6 MG/HR: at 17:48

## 2021-01-01 RX ADMIN — MINERAL OIL AND PETROLATUM: 150; 830 OINTMENT OPHTHALMIC at 12:23

## 2021-01-01 RX ADMIN — PROPOFOL 50 MCG/KG/MIN: 10 INJECTION, EMULSION INTRAVENOUS at 10:07

## 2021-01-01 RX ADMIN — PROPOFOL 50 MCG/KG/MIN: 10 INJECTION, EMULSION INTRAVENOUS at 00:34

## 2021-01-01 RX ADMIN — PROPOFOL 30 MCG/KG/MIN: 10 INJECTION, EMULSION INTRAVENOUS at 03:51

## 2021-01-01 RX ADMIN — PROPOFOL 50 MCG/KG/MIN: 10 INJECTION, EMULSION INTRAVENOUS at 16:31

## 2021-01-01 RX ADMIN — ATORVASTATIN CALCIUM 20 MG: 20 TABLET, FILM COATED ORAL at 21:44

## 2021-01-01 RX ADMIN — SODIUM CHLORIDE, PRESERVATIVE FREE 10 ML: 5 INJECTION INTRAVENOUS at 21:43

## 2021-01-01 RX ADMIN — PROPOFOL 50 MCG/KG/MIN: 10 INJECTION, EMULSION INTRAVENOUS at 04:31

## 2021-01-01 RX ADMIN — VECURONIUM BROMIDE 10.4 MG: 1 INJECTION, POWDER, LYOPHILIZED, FOR SOLUTION INTRAVENOUS at 04:19

## 2021-01-01 RX ADMIN — PROPOFOL 50 MCG/KG/MIN: 10 INJECTION, EMULSION INTRAVENOUS at 03:25

## 2021-01-01 RX ADMIN — Medication 1750 MG: at 12:54

## 2021-01-01 RX ADMIN — MINERAL OIL AND PETROLATUM: 150; 830 OINTMENT OPHTHALMIC at 11:47

## 2021-01-01 RX ADMIN — PROPOFOL 50 MCG/KG/MIN: 10 INJECTION, EMULSION INTRAVENOUS at 21:13

## 2021-01-01 RX ADMIN — CLINDAMYCIN IN 5 PERCENT DEXTROSE 600 MG: 12 INJECTION, SOLUTION INTRAVENOUS at 11:36

## 2021-01-01 RX ADMIN — SODIUM BICARBONATE TAB 650 MG 1300 MG: 650 TAB at 20:24

## 2021-01-01 RX ADMIN — HEPARIN SODIUM 5000 UNITS: 5000 INJECTION INTRAVENOUS; SUBCUTANEOUS at 13:26

## 2021-01-01 RX ADMIN — PROPOFOL 45 MCG/KG/MIN: 10 INJECTION, EMULSION INTRAVENOUS at 16:36

## 2021-01-01 RX ADMIN — Medication: at 19:14

## 2021-01-01 RX ADMIN — INSULIN ASPART 3 UNITS: 100 INJECTION, SOLUTION INTRAVENOUS; SUBCUTANEOUS at 17:00

## 2021-01-01 RX ADMIN — INSULIN ASPART 3 UNITS: 100 INJECTION, SOLUTION INTRAVENOUS; SUBCUTANEOUS at 08:55

## 2021-01-01 RX ADMIN — VORICONAZOLE 200 MG: 200 TABLET ORAL at 17:48

## 2021-01-01 RX ADMIN — HEPARIN SODIUM 9 UNITS/KG/HR: 10000 INJECTION, SOLUTION INTRAVENOUS at 16:15

## 2021-01-01 RX ADMIN — PROPOFOL 50 MCG/KG/MIN: 10 INJECTION, EMULSION INTRAVENOUS at 10:14

## 2021-01-01 RX ADMIN — SODIUM BICARBONATE TAB 650 MG 1300 MG: 650 TAB at 08:02

## 2021-01-01 RX ADMIN — INSULIN DETEMIR 20 UNITS: 100 INJECTION, SOLUTION SUBCUTANEOUS at 12:01

## 2021-01-01 RX ADMIN — Medication: at 20:30

## 2021-01-01 RX ADMIN — DOCUSATE SODIUM 50 MG AND SENNOSIDES 8.6 MG 1 TABLET: 8.6; 5 TABLET, FILM COATED ORAL at 21:42

## 2021-01-01 RX ADMIN — DEXAMETHASONE SODIUM PHOSPHATE 6 MG: 4 INJECTION, SOLUTION INTRA-ARTICULAR; INTRALESIONAL; INTRAMUSCULAR; INTRAVENOUS; SOFT TISSUE at 06:37

## 2021-01-01 RX ADMIN — SODIUM BICARBONATE TAB 650 MG 1300 MG: 650 TAB at 20:13

## 2021-01-01 RX ADMIN — FENTANYL CITRATE: 50 INJECTION INTRAVENOUS at 17:19

## 2021-01-01 RX ADMIN — SODIUM BICARBONATE TAB 650 MG 1300 MG: 650 TAB at 20:55

## 2021-01-01 RX ADMIN — CHLORHEXIDINE GLUCONATE 15 ML: 1.2 RINSE ORAL at 09:08

## 2021-01-01 RX ADMIN — SODIUM CHLORIDE 1000 ML: 9 INJECTION, SOLUTION INTRAVENOUS at 14:54

## 2021-01-01 RX ADMIN — DOXYCYCLINE 100 MG: 100 INJECTION, POWDER, LYOPHILIZED, FOR SOLUTION INTRAVENOUS at 17:05

## 2021-01-01 RX ADMIN — MINERAL OIL AND PETROLATUM: 150; 830 OINTMENT OPHTHALMIC at 16:56

## 2021-01-01 RX ADMIN — VECURONIUM BROMIDE 0.6 MCG/KG/MIN: 1 INJECTION, POWDER, LYOPHILIZED, FOR SOLUTION INTRAVENOUS at 02:13

## 2021-01-01 RX ADMIN — ALBUMIN HUMAN 25 G: 0.25 SOLUTION INTRAVENOUS at 15:30

## 2021-01-01 RX ADMIN — PANTOPRAZOLE SODIUM 40 MG: 40 INJECTION, POWDER, FOR SOLUTION INTRAVENOUS at 09:08

## 2021-01-01 RX ADMIN — MINERAL OIL AND PETROLATUM: 150; 830 OINTMENT OPHTHALMIC at 11:31

## 2021-01-01 RX ADMIN — SODIUM CHLORIDE 1000 ML: 9 INJECTION, SOLUTION INTRAVENOUS at 13:14

## 2021-01-01 RX ADMIN — DEXAMETHASONE SODIUM PHOSPHATE 6 MG: 4 INJECTION, SOLUTION INTRA-ARTICULAR; INTRALESIONAL; INTRAMUSCULAR; INTRAVENOUS; SOFT TISSUE at 04:53

## 2021-01-01 RX ADMIN — POTASSIUM CHLORIDE AND SODIUM CHLORIDE 250 ML/HR: 450; 150 INJECTION, SOLUTION INTRAVENOUS at 02:24

## 2021-01-01 RX ADMIN — PROPOFOL 50 MCG/KG/MIN: 10 INJECTION, EMULSION INTRAVENOUS at 06:33

## 2021-01-01 RX ADMIN — SODIUM CHLORIDE, PRESERVATIVE FREE 10 ML: 5 INJECTION INTRAVENOUS at 20:32

## 2021-01-01 RX ADMIN — MEROPENEM 500 MG: 500 INJECTION, POWDER, FOR SOLUTION INTRAVENOUS at 17:50

## 2021-01-01 RX ADMIN — MINERAL OIL AND PETROLATUM: 150; 830 OINTMENT OPHTHALMIC at 08:01

## 2021-01-01 RX ADMIN — CASTOR OIL AND BALSAM, PERU 1 APPLICATION: 788; 87 OINTMENT TOPICAL at 19:50

## 2021-01-01 RX ADMIN — PANTOPRAZOLE SODIUM 40 MG: 40 INJECTION, POWDER, FOR SOLUTION INTRAVENOUS at 09:39

## 2021-01-01 RX ADMIN — POTASSIUM CHLORIDE AND SODIUM CHLORIDE 250 ML/HR: 450; 150 INJECTION, SOLUTION INTRAVENOUS at 21:39

## 2021-01-01 RX ADMIN — DEXAMETHASONE SODIUM PHOSPHATE 6 MG: 4 INJECTION, SOLUTION INTRA-ARTICULAR; INTRALESIONAL; INTRAMUSCULAR; INTRAVENOUS; SOFT TISSUE at 05:38

## 2021-01-01 RX ADMIN — INSULIN DETEMIR 7 UNITS: 100 INJECTION, SOLUTION SUBCUTANEOUS at 08:04

## 2021-01-01 RX ADMIN — SODIUM BICARBONATE TAB 650 MG 1300 MG: 650 TAB at 21:27

## 2021-01-01 RX ADMIN — CHLORHEXIDINE GLUCONATE 15 ML: 1.2 RINSE ORAL at 08:35

## 2021-01-01 RX ADMIN — DEXMEDETOMIDINE 1.5 MCG/KG/HR: 100 INJECTION, SOLUTION, CONCENTRATE INTRAVENOUS at 05:57

## 2021-01-01 RX ADMIN — FENTANYL CITRATE: 50 INJECTION INTRAVENOUS at 15:34

## 2021-01-01 RX ADMIN — SODIUM CHLORIDE, PRESERVATIVE FREE 10 ML: 5 INJECTION INTRAVENOUS at 21:33

## 2021-01-01 RX ADMIN — DEXMEDETOMIDINE 1.5 MCG/KG/HR: 100 INJECTION, SOLUTION, CONCENTRATE INTRAVENOUS at 11:14

## 2021-01-01 RX ADMIN — Medication: at 02:45

## 2021-01-01 RX ADMIN — LORAZEPAM 1 MG: 2 INJECTION INTRAMUSCULAR; INTRAVENOUS at 14:59

## 2021-01-01 RX ADMIN — PROPOFOL 50 MCG/KG/MIN: 10 INJECTION, EMULSION INTRAVENOUS at 13:23

## 2021-01-01 RX ADMIN — PROPOFOL 50 MCG/KG/MIN: 10 INJECTION, EMULSION INTRAVENOUS at 08:00

## 2021-01-01 RX ADMIN — DEXTROSE MONOHYDRATE 25 G: 500 INJECTION PARENTERAL at 11:39

## 2021-01-01 RX ADMIN — CHLORHEXIDINE GLUCONATE 15 ML: 1.2 RINSE ORAL at 14:42

## 2021-01-01 RX ADMIN — NYSTATIN: 100000 POWDER TOPICAL at 21:25

## 2021-01-01 RX ADMIN — DEXMEDETOMIDINE 1.5 MCG/KG/HR: 100 INJECTION, SOLUTION, CONCENTRATE INTRAVENOUS at 06:30

## 2021-01-01 RX ADMIN — SODIUM CHLORIDE, PRESERVATIVE FREE 10 ML: 5 INJECTION INTRAVENOUS at 08:41

## 2021-01-01 RX ADMIN — INSULIN HUMAN 9 UNITS/HR: 1 INJECTION, SOLUTION INTRAVENOUS at 12:35

## 2021-01-01 RX ADMIN — PROPOFOL 50 MCG/KG/MIN: 10 INJECTION, EMULSION INTRAVENOUS at 19:16

## 2021-01-01 RX ADMIN — PROPOFOL 50 MCG/KG/MIN: 10 INJECTION, EMULSION INTRAVENOUS at 11:30

## 2021-01-01 RX ADMIN — DEXMEDETOMIDINE 1.5 MCG/KG/HR: 100 INJECTION, SOLUTION, CONCENTRATE INTRAVENOUS at 16:45

## 2021-01-01 RX ADMIN — PROPOFOL 50 MCG/KG/MIN: 10 INJECTION, EMULSION INTRAVENOUS at 23:40

## 2021-01-01 RX ADMIN — NYSTATIN: 100000 POWDER TOPICAL at 09:08

## 2021-01-01 RX ADMIN — DOXYCYCLINE 100 MG: 100 INJECTION, POWDER, LYOPHILIZED, FOR SOLUTION INTRAVENOUS at 17:46

## 2021-01-01 RX ADMIN — PROPOFOL 50 MCG/KG/MIN: 10 INJECTION, EMULSION INTRAVENOUS at 02:47

## 2021-01-01 RX ADMIN — NYSTATIN: 100000 POWDER TOPICAL at 09:03

## 2021-01-01 RX ADMIN — PIPERACILLIN SODIUM AND TAZOBACTAM SODIUM 3.38 G: 3; .375 INJECTION, POWDER, LYOPHILIZED, FOR SOLUTION INTRAVENOUS at 04:54

## 2021-01-01 RX ADMIN — PROPOFOL 50 MCG/KG/MIN: 10 INJECTION, EMULSION INTRAVENOUS at 03:07

## 2021-01-01 RX ADMIN — VECURONIUM BROMIDE 1.2 MCG/KG/MIN: 1 INJECTION, POWDER, LYOPHILIZED, FOR SOLUTION INTRAVENOUS at 20:50

## 2021-01-01 RX ADMIN — CHLORHEXIDINE GLUCONATE 15 ML: 1.2 RINSE ORAL at 20:11

## 2021-01-01 RX ADMIN — Medication: at 14:35

## 2021-01-01 RX ADMIN — METOCLOPRAMIDE HYDROCHLORIDE 10 MG: 10 INJECTION, SOLUTION INTRAMUSCULAR; INTRAVENOUS at 16:40

## 2021-01-01 RX ADMIN — SODIUM CHLORIDE, PRESERVATIVE FREE 10 ML: 5 INJECTION INTRAVENOUS at 00:24

## 2021-01-01 RX ADMIN — Medication 10 MG/HR: at 03:46

## 2021-01-01 RX ADMIN — PROPOFOL 50 MCG/KG/MIN: 10 INJECTION, EMULSION INTRAVENOUS at 13:58

## 2021-01-01 RX ADMIN — PROPOFOL 50 MCG/KG/MIN: 10 INJECTION, EMULSION INTRAVENOUS at 00:31

## 2021-01-01 RX ADMIN — VORICONAZOLE 200 MG: 200 TABLET ORAL at 06:32

## 2021-01-01 RX ADMIN — PROPOFOL 50 MCG/KG/MIN: 10 INJECTION, EMULSION INTRAVENOUS at 00:56

## 2021-01-01 RX ADMIN — SODIUM CHLORIDE 1000 ML: 9 INJECTION, SOLUTION INTRAVENOUS at 13:40

## 2021-01-01 RX ADMIN — DEXMEDETOMIDINE 1.5 MCG/KG/HR: 100 INJECTION, SOLUTION, CONCENTRATE INTRAVENOUS at 08:43

## 2021-01-01 RX ADMIN — SODIUM BICARBONATE TAB 650 MG 1300 MG: 650 TAB at 17:34

## 2021-01-01 RX ADMIN — PROPOFOL 50 MCG/KG/MIN: 10 INJECTION, EMULSION INTRAVENOUS at 13:17

## 2021-01-01 RX ADMIN — MEROPENEM 500 MG: 500 INJECTION, POWDER, FOR SOLUTION INTRAVENOUS at 16:13

## 2021-01-01 RX ADMIN — SODIUM CHLORIDE 1000 ML: 9 INJECTION, SOLUTION INTRAVENOUS at 09:28

## 2021-01-01 RX ADMIN — MINERAL OIL AND PETROLATUM: 150; 830 OINTMENT OPHTHALMIC at 07:53

## 2021-01-01 RX ADMIN — INSULIN HUMAN 9 UNITS/HR: 1 INJECTION, SOLUTION INTRAVENOUS at 02:44

## 2021-01-01 RX ADMIN — Medication: at 13:00

## 2021-01-01 RX ADMIN — SODIUM CHLORIDE, PRESERVATIVE FREE 10 ML: 5 INJECTION INTRAVENOUS at 21:39

## 2021-01-01 RX ADMIN — DEXAMETHASONE SODIUM PHOSPHATE 6 MG: 4 INJECTION, SOLUTION INTRA-ARTICULAR; INTRALESIONAL; INTRAMUSCULAR; INTRAVENOUS; SOFT TISSUE at 05:54

## 2021-01-01 RX ADMIN — INSULIN ASPART 5 UNITS: 100 INJECTION, SOLUTION INTRAVENOUS; SUBCUTANEOUS at 06:38

## 2021-01-01 RX ADMIN — MINERAL OIL AND PETROLATUM: 150; 830 OINTMENT OPHTHALMIC at 00:26

## 2021-01-01 RX ADMIN — DEXAMETHASONE SODIUM PHOSPHATE 6 MG: 4 INJECTION, SOLUTION INTRA-ARTICULAR; INTRALESIONAL; INTRAMUSCULAR; INTRAVENOUS; SOFT TISSUE at 05:09

## 2021-01-01 RX ADMIN — PROPOFOL 50 MCG/KG/MIN: 10 INJECTION, EMULSION INTRAVENOUS at 05:47

## 2021-01-01 RX ADMIN — DEXMEDETOMIDINE 1.5 MCG/KG/HR: 100 INJECTION, SOLUTION, CONCENTRATE INTRAVENOUS at 12:01

## 2021-01-01 RX ADMIN — Medication 1 MG/HR: at 20:41

## 2021-01-01 RX ADMIN — PIPERACILLIN SODIUM AND TAZOBACTAM SODIUM 3.38 G: 3; .375 INJECTION, POWDER, LYOPHILIZED, FOR SOLUTION INTRAVENOUS at 11:58

## 2021-01-01 RX ADMIN — PROPOFOL 50 MCG/KG/MIN: 10 INJECTION, EMULSION INTRAVENOUS at 11:39

## 2021-01-01 RX ADMIN — Medication: at 13:36

## 2021-01-01 RX ADMIN — CASTOR OIL AND BALSAM, PERU 1 APPLICATION: 788; 87 OINTMENT TOPICAL at 18:37

## 2021-01-01 RX ADMIN — SODIUM BICARBONATE TAB 650 MG 1300 MG: 650 TAB at 17:50

## 2021-01-01 RX ADMIN — METOCLOPRAMIDE HYDROCHLORIDE 10 MG: 10 INJECTION, SOLUTION INTRAMUSCULAR; INTRAVENOUS at 03:00

## 2021-01-01 RX ADMIN — PIPERACILLIN SODIUM AND TAZOBACTAM SODIUM 3.38 G: 3; .375 INJECTION, POWDER, LYOPHILIZED, FOR SOLUTION INTRAVENOUS at 11:52

## 2021-01-01 RX ADMIN — INSULIN DETEMIR 7 UNITS: 100 INJECTION, SOLUTION SUBCUTANEOUS at 09:41

## 2021-01-01 RX ADMIN — INSULIN HUMAN 4 UNITS/HR: 1 INJECTION, SOLUTION INTRAVENOUS at 02:04

## 2021-01-01 RX ADMIN — DEXAMETHASONE SODIUM PHOSPHATE 6 MG: 4 INJECTION, SOLUTION INTRA-ARTICULAR; INTRALESIONAL; INTRAMUSCULAR; INTRAVENOUS; SOFT TISSUE at 04:13

## 2021-01-01 RX ADMIN — Medication 50 MEQ: at 11:58

## 2021-01-01 RX ADMIN — Medication: at 00:35

## 2021-01-01 RX ADMIN — CHLORHEXIDINE GLUCONATE 15 ML: 1.2 RINSE ORAL at 10:54

## 2021-01-01 RX ADMIN — NYSTATIN: 100000 POWDER TOPICAL at 09:39

## 2021-01-01 RX ADMIN — DEXMEDETOMIDINE 1.5 MCG/KG/HR: 100 INJECTION, SOLUTION, CONCENTRATE INTRAVENOUS at 00:26

## 2021-01-01 RX ADMIN — PROPOFOL 30 MCG/KG/MIN: 10 INJECTION, EMULSION INTRAVENOUS at 17:05

## 2021-01-01 RX ADMIN — INSULIN DETEMIR 15 UNITS: 100 INJECTION, SOLUTION SUBCUTANEOUS at 09:04

## 2021-01-01 RX ADMIN — Medication: at 06:31

## 2021-01-01 RX ADMIN — POLYETHYLENE GLYCOL (3350) 17 G: 17 POWDER, FOR SOLUTION ORAL at 09:04

## 2021-01-01 RX ADMIN — DEXMEDETOMIDINE 1.5 MCG/KG/HR: 100 INJECTION, SOLUTION, CONCENTRATE INTRAVENOUS at 14:08

## 2021-01-01 RX ADMIN — NYSTATIN: 100000 POWDER TOPICAL at 21:15

## 2021-01-01 RX ADMIN — VECURONIUM BROMIDE 0.6 MCG/KG/MIN: 1 INJECTION, POWDER, LYOPHILIZED, FOR SOLUTION INTRAVENOUS at 06:05

## 2021-01-01 RX ADMIN — PANTOPRAZOLE SODIUM 40 MG: 40 INJECTION, POWDER, FOR SOLUTION INTRAVENOUS at 21:07

## 2021-01-01 RX ADMIN — Medication 10 MG/HR: at 16:20

## 2021-01-01 RX ADMIN — PROPOFOL 20 MCG/KG/MIN: 10 INJECTION, EMULSION INTRAVENOUS at 03:25

## 2021-01-01 RX ADMIN — DEXMEDETOMIDINE 1.4 MCG/KG/HR: 100 INJECTION, SOLUTION, CONCENTRATE INTRAVENOUS at 21:06

## 2021-01-01 RX ADMIN — MINERAL OIL AND PETROLATUM: 150; 830 OINTMENT OPHTHALMIC at 15:41

## 2021-01-01 RX ADMIN — MAGNESIUM SULFATE HEPTAHYDRATE 2 G: 40 INJECTION, SOLUTION INTRAVENOUS at 15:25

## 2021-01-01 RX ADMIN — PIPERACILLIN SODIUM AND TAZOBACTAM SODIUM 3.38 G: 3; .375 INJECTION, POWDER, LYOPHILIZED, FOR SOLUTION INTRAVENOUS at 21:07

## 2021-01-01 RX ADMIN — FENTANYL CITRATE: 50 INJECTION INTRAVENOUS at 22:17

## 2021-01-01 RX ADMIN — PROPOFOL 50 MCG/KG/MIN: 10 INJECTION, EMULSION INTRAVENOUS at 01:57

## 2021-01-01 RX ADMIN — INSULIN ASPART 2 UNITS: 100 INJECTION, SOLUTION INTRAVENOUS; SUBCUTANEOUS at 11:39

## 2021-01-01 RX ADMIN — SODIUM CHLORIDE 2 G: 9 INJECTION, SOLUTION INTRAVENOUS at 11:35

## 2021-01-01 RX ADMIN — Medication 10 MG/HR: at 00:49

## 2021-01-01 RX ADMIN — INSULIN ASPART 3 UNITS: 100 INJECTION, SOLUTION INTRAVENOUS; SUBCUTANEOUS at 18:29

## 2021-01-01 RX ADMIN — HEPARIN SODIUM 10 UNITS/KG/HR: 10000 INJECTION, SOLUTION INTRAVENOUS at 16:25

## 2021-01-01 RX ADMIN — PROPOFOL 45 MCG/KG/MIN: 10 INJECTION, EMULSION INTRAVENOUS at 13:16

## 2021-01-01 RX ADMIN — CHLORHEXIDINE GLUCONATE 15 ML: 1.2 RINSE ORAL at 19:46

## 2021-01-01 RX ADMIN — SODIUM BICARBONATE TAB 650 MG 1300 MG: 650 TAB at 21:32

## 2021-01-01 RX ADMIN — DEXMEDETOMIDINE 1.5 MCG/KG/HR: 100 INJECTION, SOLUTION, CONCENTRATE INTRAVENOUS at 08:34

## 2021-01-01 RX ADMIN — SODIUM BICARBONATE TAB 650 MG 1300 MG: 650 TAB at 16:20

## 2021-01-01 RX ADMIN — MEROPENEM 500 MG: 500 INJECTION, POWDER, FOR SOLUTION INTRAVENOUS at 17:06

## 2021-01-01 RX ADMIN — NYSTATIN: 100000 POWDER TOPICAL at 08:47

## 2021-01-01 RX ADMIN — PROPOFOL 50 MCG/KG/MIN: 10 INJECTION, EMULSION INTRAVENOUS at 03:00

## 2021-01-01 RX ADMIN — FENTANYL CITRATE 50 MCG: 50 INJECTION INTRAMUSCULAR; INTRAVENOUS at 19:49

## 2021-01-01 RX ADMIN — VORICONAZOLE 200 MG: 200 TABLET ORAL at 17:33

## 2021-01-01 RX ADMIN — MINERAL OIL AND PETROLATUM: 150; 830 OINTMENT OPHTHALMIC at 11:40

## 2021-01-01 RX ADMIN — SODIUM CHLORIDE 2000 ML: 9 INJECTION, SOLUTION INTRAVENOUS at 11:15

## 2021-01-01 RX ADMIN — PROPOFOL 40 MCG/KG/MIN: 10 INJECTION, EMULSION INTRAVENOUS at 15:54

## 2021-01-01 RX ADMIN — SODIUM CHLORIDE, PRESERVATIVE FREE 10 ML: 5 INJECTION INTRAVENOUS at 08:27

## 2021-01-01 RX ADMIN — VECURONIUM BROMIDE 8 MG: 1 INJECTION, POWDER, LYOPHILIZED, FOR SOLUTION INTRAVENOUS at 21:59

## 2021-01-01 RX ADMIN — ACETAMINOPHEN 650 MG: 325 TABLET ORAL at 10:31

## 2021-01-01 RX ADMIN — PROPOFOL 50 MCG/KG/MIN: 10 INJECTION, EMULSION INTRAVENOUS at 12:59

## 2021-01-01 RX ADMIN — DEXAMETHASONE SODIUM PHOSPHATE 6 MG: 4 INJECTION, SOLUTION INTRA-ARTICULAR; INTRALESIONAL; INTRAMUSCULAR; INTRAVENOUS; SOFT TISSUE at 05:25

## 2021-01-01 RX ADMIN — INSULIN DETEMIR 7 UNITS: 100 INJECTION, SOLUTION SUBCUTANEOUS at 20:14

## 2021-01-01 RX ADMIN — MINERAL OIL AND PETROLATUM: 150; 830 OINTMENT OPHTHALMIC at 11:36

## 2021-01-01 RX ADMIN — DOCUSATE SODIUM 50 MG AND SENNOSIDES 8.6 MG 1 TABLET: 8.6; 5 TABLET, FILM COATED ORAL at 09:04

## 2021-01-01 RX ADMIN — PANTOPRAZOLE SODIUM 40 MG: 40 INJECTION, POWDER, FOR SOLUTION INTRAVENOUS at 20:16

## 2021-01-01 RX ADMIN — SODIUM CHLORIDE, PRESERVATIVE FREE 10 ML: 5 INJECTION INTRAVENOUS at 21:07

## 2021-01-01 RX ADMIN — HEPARIN SODIUM 5000 UNITS: 5000 INJECTION INTRAVENOUS; SUBCUTANEOUS at 05:47

## 2021-01-01 RX ADMIN — CASTOR OIL AND BALSAM, PERU 1 APPLICATION: 788; 87 OINTMENT TOPICAL at 20:23

## 2021-01-01 RX ADMIN — NYSTATIN: 100000 POWDER TOPICAL at 21:42

## 2021-01-01 RX ADMIN — INSULIN DETEMIR 15 UNITS: 100 INJECTION, SOLUTION SUBCUTANEOUS at 21:32

## 2021-01-01 RX ADMIN — SODIUM BICARBONATE TAB 650 MG 1300 MG: 650 TAB at 08:26

## 2021-01-01 RX ADMIN — DEXAMETHASONE SODIUM PHOSPHATE 6 MG: 4 INJECTION, SOLUTION INTRA-ARTICULAR; INTRALESIONAL; INTRAMUSCULAR; INTRAVENOUS; SOFT TISSUE at 04:23

## 2021-01-01 RX ADMIN — Medication: at 10:58

## 2021-01-01 RX ADMIN — DOCUSATE SODIUM 50 MG AND SENNOSIDES 8.6 MG 1 TABLET: 8.6; 5 TABLET, FILM COATED ORAL at 20:46

## 2021-01-01 RX ADMIN — ALBUMIN HUMAN 25 G: 0.25 SOLUTION INTRAVENOUS at 15:34

## 2021-01-01 RX ADMIN — SODIUM CHLORIDE, PRESERVATIVE FREE 10 ML: 5 INJECTION INTRAVENOUS at 21:50

## 2021-01-01 RX ADMIN — SODIUM CHLORIDE, PRESERVATIVE FREE 10 ML: 5 INJECTION INTRAVENOUS at 20:26

## 2021-01-01 RX ADMIN — VECURONIUM BROMIDE 0.6 MCG/KG/MIN: 1 INJECTION, POWDER, LYOPHILIZED, FOR SOLUTION INTRAVENOUS at 04:13

## 2021-01-01 RX ADMIN — PANTOPRAZOLE SODIUM 40 MG: 40 INJECTION, POWDER, FOR SOLUTION INTRAVENOUS at 20:25

## 2021-01-01 RX ADMIN — SODIUM BICARBONATE TAB 650 MG 1300 MG: 650 TAB at 20:12

## 2021-01-01 RX ADMIN — HEPARIN SODIUM 5000 UNITS: 5000 INJECTION INTRAVENOUS; SUBCUTANEOUS at 22:18

## 2021-01-01 RX ADMIN — PROPOFOL 30 MCG/KG/MIN: 10 INJECTION, EMULSION INTRAVENOUS at 22:22

## 2021-01-01 RX ADMIN — HEPARIN SODIUM 5000 UNITS: 5000 INJECTION INTRAVENOUS; SUBCUTANEOUS at 05:57

## 2021-01-01 RX ADMIN — SODIUM CHLORIDE, PRESERVATIVE FREE 10 ML: 5 INJECTION INTRAVENOUS at 20:14

## 2021-01-01 RX ADMIN — Medication 10 MG/HR: at 03:51

## 2021-01-01 RX ADMIN — POTASSIUM CHLORIDE, DEXTROSE MONOHYDRATE AND SODIUM CHLORIDE 150 ML/HR: 150; 5; 450 INJECTION, SOLUTION INTRAVENOUS at 00:51

## 2021-01-01 RX ADMIN — Medication: at 15:29

## 2021-01-01 RX ADMIN — DOCUSATE SODIUM 50 MG AND SENNOSIDES 8.6 MG 1 TABLET: 8.6; 5 TABLET, FILM COATED ORAL at 21:39

## 2021-01-01 RX ADMIN — CHLORHEXIDINE GLUCONATE 15 ML: 1.2 RINSE ORAL at 21:08

## 2021-01-01 RX ADMIN — INSULIN DETEMIR 25 UNITS: 100 INJECTION, SOLUTION SUBCUTANEOUS at 21:34

## 2021-01-01 RX ADMIN — PROPOFOL 50 MCG/KG/MIN: 10 INJECTION, EMULSION INTRAVENOUS at 16:20

## 2021-01-01 RX ADMIN — PROPOFOL 50 MCG/KG/MIN: 10 INJECTION, EMULSION INTRAVENOUS at 23:45

## 2021-01-01 RX ADMIN — MINERAL OIL AND PETROLATUM: 150; 830 OINTMENT OPHTHALMIC at 00:40

## 2021-01-01 RX ADMIN — DOXYCYCLINE 100 MG: 100 INJECTION, POWDER, LYOPHILIZED, FOR SOLUTION INTRAVENOUS at 05:00

## 2021-01-01 RX ADMIN — MINERAL OIL AND PETROLATUM: 150; 830 OINTMENT OPHTHALMIC at 04:30

## 2021-01-01 RX ADMIN — PROPOFOL 50 MCG/KG/MIN: 10 INJECTION, EMULSION INTRAVENOUS at 16:43

## 2021-01-01 RX ADMIN — FENTANYL CITRATE: 50 INJECTION INTRAVENOUS at 03:27

## 2021-01-01 RX ADMIN — DOCUSATE SODIUM 50 MG AND SENNOSIDES 8.6 MG 1 TABLET: 8.6; 5 TABLET, FILM COATED ORAL at 21:32

## 2021-01-01 RX ADMIN — PANTOPRAZOLE SODIUM 40 MG: 40 INJECTION, POWDER, FOR SOLUTION INTRAVENOUS at 21:30

## 2021-01-01 RX ADMIN — SODIUM BICARBONATE TAB 650 MG 1300 MG: 650 TAB at 19:45

## 2021-01-01 RX ADMIN — FENTANYL CITRATE: 50 INJECTION, SOLUTION INTRAMUSCULAR; INTRAVENOUS at 11:52

## 2021-01-01 RX ADMIN — DEXTROSE MONOHYDRATE 25 G: 500 INJECTION PARENTERAL at 17:02

## 2021-01-01 RX ADMIN — PROPOFOL 50 MCG/KG/MIN: 10 INJECTION, EMULSION INTRAVENOUS at 09:33

## 2021-01-01 RX ADMIN — HYDROMORPHONE HYDROCHLORIDE 0.5 MG: 1 INJECTION, SOLUTION INTRAMUSCULAR; INTRAVENOUS; SUBCUTANEOUS at 21:08

## 2021-01-01 RX ADMIN — INSULIN DETEMIR 15 UNITS: 100 INJECTION, SOLUTION SUBCUTANEOUS at 03:31

## 2021-01-01 RX ADMIN — DEXTROSE MONOHYDRATE 50 ML: 500 INJECTION PARENTERAL at 17:34

## 2021-01-01 RX ADMIN — NOREPINEPHRINE BITARTRATE 0.3 MCG/KG/MIN: 1 INJECTION, SOLUTION, CONCENTRATE INTRAVENOUS at 22:24

## 2021-01-01 RX ADMIN — PANTOPRAZOLE SODIUM 40 MG: 40 INJECTION, POWDER, FOR SOLUTION INTRAVENOUS at 08:16

## 2021-01-01 RX ADMIN — HEPARIN SODIUM 5000 UNITS: 5000 INJECTION INTRAVENOUS; SUBCUTANEOUS at 17:09

## 2021-01-01 RX ADMIN — HEPARIN SODIUM 13 UNITS/KG/HR: 10000 INJECTION, SOLUTION INTRAVENOUS at 16:50

## 2021-01-01 RX ADMIN — LINEZOLID 600 MG: 600 INJECTION, SOLUTION INTRAVENOUS at 21:41

## 2021-01-01 RX ADMIN — MINERAL OIL AND PETROLATUM: 150; 830 OINTMENT OPHTHALMIC at 23:38

## 2021-01-01 RX ADMIN — PROPOFOL 50 MCG/KG/MIN: 10 INJECTION, EMULSION INTRAVENOUS at 12:50

## 2021-01-01 RX ADMIN — MINERAL OIL AND PETROLATUM: 150; 830 OINTMENT OPHTHALMIC at 17:34

## 2021-01-01 RX ADMIN — SODIUM CHLORIDE, PRESERVATIVE FREE 10 ML: 5 INJECTION INTRAVENOUS at 08:40

## 2021-01-01 RX ADMIN — SODIUM CHLORIDE, PRESERVATIVE FREE 10 ML: 5 INJECTION INTRAVENOUS at 19:46

## 2021-01-01 RX ADMIN — SODIUM CHLORIDE, PRESERVATIVE FREE 10 ML: 5 INJECTION INTRAVENOUS at 09:08

## 2021-01-01 RX ADMIN — DEXMEDETOMIDINE 1.5 MCG/KG/HR: 100 INJECTION, SOLUTION, CONCENTRATE INTRAVENOUS at 22:48

## 2021-01-01 RX ADMIN — DIPHENHYDRAMINE HYDROCHLORIDE 50 MG: 50 INJECTION, SOLUTION INTRAMUSCULAR; INTRAVENOUS at 13:43

## 2021-01-01 RX ADMIN — DEXAMETHASONE SODIUM PHOSPHATE 6 MG: 4 INJECTION, SOLUTION INTRA-ARTICULAR; INTRALESIONAL; INTRAMUSCULAR; INTRAVENOUS; SOFT TISSUE at 05:49

## 2021-01-01 RX ADMIN — PANTOPRAZOLE SODIUM 40 MG: 40 INJECTION, POWDER, FOR SOLUTION INTRAVENOUS at 21:43

## 2021-01-01 RX ADMIN — DEXTROSE MONOHYDRATE 10 ML: 500 INJECTION PARENTERAL at 06:59

## 2021-01-01 RX ADMIN — Medication: at 04:12

## 2021-01-01 RX ADMIN — PROPOFOL 45 MCG/KG/MIN: 10 INJECTION, EMULSION INTRAVENOUS at 00:24

## 2021-01-01 RX ADMIN — ACETAMINOPHEN 650 MG: 325 TABLET ORAL at 14:28

## 2021-01-01 RX ADMIN — PROPOFOL 50 MCG/KG/MIN: 10 INJECTION, EMULSION INTRAVENOUS at 09:47

## 2021-01-01 RX ADMIN — FENTANYL CITRATE 50 MCG: 50 INJECTION INTRAMUSCULAR; INTRAVENOUS at 00:19

## 2021-01-01 RX ADMIN — IOPAMIDOL 130 ML: 755 INJECTION, SOLUTION INTRAVENOUS at 10:19

## 2021-01-01 RX ADMIN — HEPARIN SODIUM 5000 UNITS: 5000 INJECTION INTRAVENOUS; SUBCUTANEOUS at 05:09

## 2021-01-01 RX ADMIN — VORICONAZOLE 200 MG: 200 TABLET ORAL at 17:50

## 2021-01-01 RX ADMIN — INSULIN DETEMIR 7 UNITS: 100 INJECTION, SOLUTION SUBCUTANEOUS at 09:18

## 2021-01-01 RX ADMIN — CHLORHEXIDINE GLUCONATE 15 ML: 1.2 RINSE ORAL at 08:46

## 2021-01-01 RX ADMIN — DEXMEDETOMIDINE 1.5 MCG/KG/HR: 100 INJECTION, SOLUTION, CONCENTRATE INTRAVENOUS at 06:15

## 2021-01-01 RX ADMIN — NYSTATIN: 100000 POWDER TOPICAL at 20:27

## 2021-01-01 RX ADMIN — ATORVASTATIN CALCIUM 20 MG: 20 TABLET, FILM COATED ORAL at 20:23

## 2021-01-01 RX ADMIN — DEXAMETHASONE SODIUM PHOSPHATE 6 MG: 4 INJECTION, SOLUTION INTRA-ARTICULAR; INTRALESIONAL; INTRAMUSCULAR; INTRAVENOUS; SOFT TISSUE at 15:55

## 2021-01-01 RX ADMIN — NOREPINEPHRINE BITARTRATE 0.12 MCG/KG/MIN: 1 INJECTION INTRAVENOUS at 09:34

## 2021-01-01 RX ADMIN — PANTOPRAZOLE SODIUM 40 MG: 40 INJECTION, POWDER, FOR SOLUTION INTRAVENOUS at 09:00

## 2021-01-01 RX ADMIN — PROPOFOL 50 MCG/KG/MIN: 10 INJECTION, EMULSION INTRAVENOUS at 17:23

## 2021-01-01 RX ADMIN — CHLORHEXIDINE GLUCONATE 15 ML: 1.2 RINSE ORAL at 08:02

## 2021-01-01 RX ADMIN — FENTANYL CITRATE: 50 INJECTION INTRAVENOUS at 15:55

## 2021-01-01 RX ADMIN — SODIUM CHLORIDE, PRESERVATIVE FREE 10 ML: 5 INJECTION INTRAVENOUS at 20:11

## 2021-01-01 RX ADMIN — DEXAMETHASONE SODIUM PHOSPHATE 6 MG: 4 INJECTION, SOLUTION INTRA-ARTICULAR; INTRALESIONAL; INTRAMUSCULAR; INTRAVENOUS; SOFT TISSUE at 16:47

## 2021-01-01 RX ADMIN — DEXAMETHASONE SODIUM PHOSPHATE 6 MG: 4 INJECTION, SOLUTION INTRA-ARTICULAR; INTRALESIONAL; INTRAMUSCULAR; INTRAVENOUS; SOFT TISSUE at 05:55

## 2021-01-01 RX ADMIN — PROPOFOL 50 MCG/KG/MIN: 10 INJECTION, EMULSION INTRAVENOUS at 15:14

## 2021-01-01 RX ADMIN — SODIUM CHLORIDE 1000 ML: 9 INJECTION, SOLUTION INTRAVENOUS at 08:55

## 2021-01-01 RX ADMIN — PROPOFOL 50 MCG/KG/MIN: 10 INJECTION, EMULSION INTRAVENOUS at 19:55

## 2021-01-01 RX ADMIN — PROPOFOL 50 MCG/KG/MIN: 10 INJECTION, EMULSION INTRAVENOUS at 23:20

## 2021-01-01 RX ADMIN — DEXMEDETOMIDINE 1.5 MCG/KG/HR: 100 INJECTION, SOLUTION, CONCENTRATE INTRAVENOUS at 04:37

## 2021-01-01 RX ADMIN — PROPOFOL 30 MCG/KG/MIN: 10 INJECTION, EMULSION INTRAVENOUS at 10:25

## 2021-01-01 RX ADMIN — INSULIN DETEMIR 7 UNITS: 100 INJECTION, SOLUTION SUBCUTANEOUS at 22:10

## 2021-01-01 RX ADMIN — SODIUM BICARBONATE TAB 650 MG 1300 MG: 650 TAB at 09:09

## 2021-01-01 RX ADMIN — DEXMEDETOMIDINE 1.5 MCG/KG/HR: 100 INJECTION, SOLUTION, CONCENTRATE INTRAVENOUS at 14:11

## 2021-01-01 RX ADMIN — PROPOFOL 50 MCG/KG/MIN: 10 INJECTION, EMULSION INTRAVENOUS at 11:35

## 2021-01-01 RX ADMIN — FENTANYL CITRATE: 50 INJECTION INTRAVENOUS at 07:16

## 2021-01-01 RX ADMIN — Medication: at 08:58

## 2021-01-01 RX ADMIN — INSULIN ASPART 8 UNITS: 100 INJECTION, SOLUTION INTRAVENOUS; SUBCUTANEOUS at 00:02

## 2021-01-01 RX ADMIN — PROPOFOL 25 MCG/KG/MIN: 10 INJECTION, EMULSION INTRAVENOUS at 06:11

## 2021-01-01 RX ADMIN — Medication: at 05:47

## 2021-01-01 RX ADMIN — MEROPENEM 500 MG: 500 INJECTION, POWDER, FOR SOLUTION INTRAVENOUS at 12:01

## 2021-01-01 RX ADMIN — NYSTATIN: 100000 POWDER TOPICAL at 08:26

## 2021-01-01 RX ADMIN — SODIUM CHLORIDE, PRESERVATIVE FREE 10 ML: 5 INJECTION INTRAVENOUS at 21:41

## 2021-01-01 RX ADMIN — CLINDAMYCIN IN 5 PERCENT DEXTROSE 600 MG: 12 INJECTION, SOLUTION INTRAVENOUS at 17:06

## 2021-01-01 RX ADMIN — VORICONAZOLE 500 MG: 10 INJECTION, POWDER, LYOPHILIZED, FOR SOLUTION INTRAVENOUS at 04:20

## 2021-01-01 RX ADMIN — SODIUM CHLORIDE 2 G: 9 INJECTION, SOLUTION INTRAVENOUS at 11:39

## 2021-01-01 RX ADMIN — INSULIN ASPART 3 UNITS: 100 INJECTION, SOLUTION INTRAVENOUS; SUBCUTANEOUS at 06:39

## 2021-01-01 RX ADMIN — PANTOPRAZOLE SODIUM 40 MG: 40 INJECTION, POWDER, FOR SOLUTION INTRAVENOUS at 08:54

## 2021-01-01 RX ADMIN — SODIUM CHLORIDE, PRESERVATIVE FREE 10 ML: 5 INJECTION INTRAVENOUS at 09:15

## 2021-01-01 RX ADMIN — PIPERACILLIN SODIUM AND TAZOBACTAM SODIUM 3.38 G: 3; .375 INJECTION, POWDER, LYOPHILIZED, FOR SOLUTION INTRAVENOUS at 19:54

## 2021-01-01 RX ADMIN — PROPOFOL 50 MCG/KG/MIN: 10 INJECTION, EMULSION INTRAVENOUS at 01:10

## 2021-01-01 RX ADMIN — SODIUM CHLORIDE, PRESERVATIVE FREE 3 ML: 5 INJECTION INTRAVENOUS at 08:14

## 2021-01-01 RX ADMIN — NOREPINEPHRINE BITARTRATE 0.24 MCG/KG/MIN: 1 INJECTION, SOLUTION, CONCENTRATE INTRAVENOUS at 04:37

## 2021-01-01 RX ADMIN — INSULIN ASPART 9 UNITS: 100 INJECTION, SOLUTION INTRAVENOUS; SUBCUTANEOUS at 12:19

## 2021-01-01 RX ADMIN — SODIUM BICARBONATE TAB 650 MG 1300 MG: 650 TAB at 21:16

## 2021-01-01 RX ADMIN — PANTOPRAZOLE SODIUM 40 MG: 40 INJECTION, POWDER, FOR SOLUTION INTRAVENOUS at 21:42

## 2021-01-01 RX ADMIN — PROPOFOL 50 MCG/KG/MIN: 10 INJECTION, EMULSION INTRAVENOUS at 10:34

## 2021-01-01 RX ADMIN — VANCOMYCIN HYDROCHLORIDE 1000 MG: 1 INJECTION, POWDER, LYOPHILIZED, FOR SOLUTION INTRAVENOUS at 17:13

## 2021-01-01 RX ADMIN — SODIUM BICARBONATE TAB 650 MG 1300 MG: 650 TAB at 16:36

## 2021-01-01 RX ADMIN — Medication: at 10:17

## 2021-01-01 RX ADMIN — MINERAL OIL AND PETROLATUM: 150; 830 OINTMENT OPHTHALMIC at 04:24

## 2021-01-01 RX ADMIN — CASTOR OIL AND BALSAM, PERU 1 APPLICATION: 788; 87 OINTMENT TOPICAL at 21:08

## 2021-01-01 RX ADMIN — PROPOFOL 50 MCG/KG/MIN: 10 INJECTION, EMULSION INTRAVENOUS at 20:29

## 2021-01-01 RX ADMIN — HEPARIN SODIUM 2500 UNITS: 5000 INJECTION INTRAVENOUS; SUBCUTANEOUS at 05:37

## 2021-01-01 RX ADMIN — FENTANYL CITRATE: 50 INJECTION INTRAVENOUS at 04:53

## 2021-01-01 RX ADMIN — INSULIN ASPART 5 UNITS: 100 INJECTION, SOLUTION INTRAVENOUS; SUBCUTANEOUS at 08:18

## 2021-01-01 RX ADMIN — DEXMEDETOMIDINE 1.5 MCG/KG/HR: 100 INJECTION, SOLUTION, CONCENTRATE INTRAVENOUS at 19:16

## 2021-01-01 RX ADMIN — PIPERACILLIN SODIUM AND TAZOBACTAM SODIUM 4.5 G: 4; .5 INJECTION, POWDER, LYOPHILIZED, FOR SOLUTION INTRAVENOUS at 20:24

## 2021-01-01 RX ADMIN — PROPOFOL 50 MCG/KG/MIN: 10 INJECTION, EMULSION INTRAVENOUS at 00:51

## 2021-01-01 RX ADMIN — INSULIN DETEMIR 25 UNITS: 100 INJECTION, SOLUTION SUBCUTANEOUS at 08:50

## 2021-01-01 RX ADMIN — POTASSIUM CHLORIDE AND SODIUM CHLORIDE 250 ML/HR: 450; 150 INJECTION, SOLUTION INTRAVENOUS at 16:34

## 2021-01-01 RX ADMIN — CHLORHEXIDINE GLUCONATE 15 ML: 1.2 RINSE ORAL at 09:04

## 2021-01-01 RX ADMIN — PROPOFOL 40 MCG/KG/MIN: 10 INJECTION, EMULSION INTRAVENOUS at 11:31

## 2021-01-01 RX ADMIN — DEXTROSE MONOHYDRATE AND SODIUM CHLORIDE 30 ML/HR: 5; .9 INJECTION, SOLUTION INTRAVENOUS at 05:46

## 2021-01-01 RX ADMIN — MINERAL OIL AND PETROLATUM: 150; 830 OINTMENT OPHTHALMIC at 19:58

## 2021-01-01 RX ADMIN — HEPARIN SODIUM 14 UNITS/KG/HR: 10000 INJECTION, SOLUTION INTRAVENOUS at 19:10

## 2021-01-01 RX ADMIN — SODIUM CHLORIDE, PRESERVATIVE FREE 10 ML: 5 INJECTION INTRAVENOUS at 21:32

## 2021-01-01 RX ADMIN — MICAFUNGIN SODIUM 100 MG: 100 INJECTION, POWDER, LYOPHILIZED, FOR SOLUTION INTRAVENOUS at 16:41

## 2021-01-01 RX ADMIN — MINERAL OIL AND PETROLATUM: 150; 830 OINTMENT OPHTHALMIC at 17:44

## 2021-01-01 RX ADMIN — LINEZOLID 600 MG: 600 INJECTION, SOLUTION INTRAVENOUS at 09:17

## 2021-01-01 RX ADMIN — SODIUM CHLORIDE, PRESERVATIVE FREE 10 ML: 5 INJECTION INTRAVENOUS at 08:14

## 2021-01-01 RX ADMIN — DEXMEDETOMIDINE 1.5 MCG/KG/HR: 100 INJECTION, SOLUTION, CONCENTRATE INTRAVENOUS at 12:54

## 2021-01-01 RX ADMIN — CASTOR OIL AND BALSAM, PERU 1 APPLICATION: 788; 87 OINTMENT TOPICAL at 20:31

## 2021-01-01 RX ADMIN — HEPARIN SODIUM 5000 UNITS: 5000 INJECTION INTRAVENOUS; SUBCUTANEOUS at 06:25

## 2021-01-01 RX ADMIN — MORPHINE SULFATE: 10 INJECTION INTRAVENOUS at 15:54

## 2021-01-01 RX ADMIN — HEPARIN SODIUM 5000 UNITS: 5000 INJECTION INTRAVENOUS; SUBCUTANEOUS at 21:40

## 2021-01-01 RX ADMIN — Medication: at 09:33

## 2021-01-01 RX ADMIN — FENTANYL CITRATE: 50 INJECTION, SOLUTION INTRAMUSCULAR; INTRAVENOUS at 06:53

## 2021-01-01 RX ADMIN — SODIUM CHLORIDE, PRESERVATIVE FREE 3 ML: 5 INJECTION INTRAVENOUS at 09:18

## 2021-01-01 RX ADMIN — CHLORHEXIDINE GLUCONATE 15 ML: 1.2 RINSE ORAL at 09:00

## 2021-01-01 RX ADMIN — SODIUM CHLORIDE, PRESERVATIVE FREE 10 ML: 5 INJECTION INTRAVENOUS at 09:07

## 2021-01-01 RX ADMIN — SODIUM CHLORIDE, PRESERVATIVE FREE 10 ML: 5 INJECTION INTRAVENOUS at 21:28

## 2021-01-01 RX ADMIN — SODIUM CHLORIDE, PRESERVATIVE FREE 10 ML: 5 INJECTION INTRAVENOUS at 08:04

## 2021-01-01 RX ADMIN — INSULIN DETEMIR 7 UNITS: 100 INJECTION, SOLUTION SUBCUTANEOUS at 09:10

## 2021-01-01 RX ADMIN — PROPOFOL 35 MCG/KG/MIN: 10 INJECTION, EMULSION INTRAVENOUS at 11:49

## 2021-01-01 RX ADMIN — SODIUM CHLORIDE, PRESERVATIVE FREE 10 ML: 5 INJECTION INTRAVENOUS at 08:16

## 2021-01-01 RX ADMIN — PANTOPRAZOLE SODIUM 40 MG: 40 INJECTION, POWDER, FOR SOLUTION INTRAVENOUS at 08:14

## 2021-01-01 RX ADMIN — MINERAL OIL AND PETROLATUM: 150; 830 OINTMENT OPHTHALMIC at 11:10

## 2021-01-01 RX ADMIN — VECURONIUM BROMIDE 1.2 MCG/KG/MIN: 1 INJECTION, POWDER, LYOPHILIZED, FOR SOLUTION INTRAVENOUS at 04:18

## 2021-01-01 RX ADMIN — POLYETHYLENE GLYCOL (3350) 17 G: 17 POWDER, FOR SOLUTION ORAL at 11:40

## 2021-01-01 RX ADMIN — MICAFUNGIN SODIUM 100 MG: 100 INJECTION, POWDER, LYOPHILIZED, FOR SOLUTION INTRAVENOUS at 11:59

## 2021-01-01 RX ADMIN — CHLORHEXIDINE GLUCONATE 15 ML: 1.2 RINSE ORAL at 21:15

## 2021-01-01 RX ADMIN — MINERAL OIL AND PETROLATUM: 150; 830 OINTMENT OPHTHALMIC at 17:37

## 2021-01-01 RX ADMIN — NYSTATIN: 100000 POWDER TOPICAL at 12:29

## 2021-01-01 RX ADMIN — Medication: at 20:46

## 2021-01-01 RX ADMIN — VORICONAZOLE 500 MG: 10 INJECTION, POWDER, LYOPHILIZED, FOR SOLUTION INTRAVENOUS at 16:19

## 2021-01-01 RX ADMIN — DOCUSATE SODIUM 50 MG AND SENNOSIDES 8.6 MG 1 TABLET: 8.6; 5 TABLET, FILM COATED ORAL at 09:02

## 2021-01-01 RX ADMIN — NOREPINEPHRINE BITARTRATE 0.1 MCG/KG/MIN: 1 INJECTION INTRAVENOUS at 12:05

## 2021-01-01 RX ADMIN — HEPARIN SODIUM 5000 UNITS: 5000 INJECTION INTRAVENOUS; SUBCUTANEOUS at 06:21

## 2021-01-01 RX ADMIN — HEPARIN SODIUM 5000 UNITS: 5000 INJECTION INTRAVENOUS; SUBCUTANEOUS at 05:44

## 2021-01-01 RX ADMIN — MINERAL OIL AND PETROLATUM: 150; 830 OINTMENT OPHTHALMIC at 20:13

## 2021-01-01 RX ADMIN — HEPARIN SODIUM 5000 UNITS: 5000 INJECTION INTRAVENOUS; SUBCUTANEOUS at 20:54

## 2021-01-01 RX ADMIN — HEPARIN SODIUM 15 UNITS/KG/HR: 10000 INJECTION, SOLUTION INTRAVENOUS at 19:42

## 2021-01-01 RX ADMIN — DEXAMETHASONE SODIUM PHOSPHATE 6 MG: 4 INJECTION, SOLUTION INTRA-ARTICULAR; INTRALESIONAL; INTRAMUSCULAR; INTRAVENOUS; SOFT TISSUE at 05:57

## 2021-01-01 RX ADMIN — INSULIN ASPART 5 UNITS: 100 INJECTION, SOLUTION INTRAVENOUS; SUBCUTANEOUS at 05:24

## 2021-01-01 RX ADMIN — PROPOFOL 50 MCG/KG/MIN: 10 INJECTION, EMULSION INTRAVENOUS at 09:43

## 2021-01-01 RX ADMIN — SODIUM CHLORIDE, PRESERVATIVE FREE 10 ML: 5 INJECTION INTRAVENOUS at 09:09

## 2021-01-01 RX ADMIN — SODIUM BICARBONATE TAB 650 MG 1300 MG: 650 TAB at 18:40

## 2021-01-01 RX ADMIN — PANTOPRAZOLE SODIUM 40 MG: 40 INJECTION, POWDER, FOR SOLUTION INTRAVENOUS at 20:55

## 2021-01-01 RX ADMIN — Medication: at 06:19

## 2021-01-01 RX ADMIN — Medication: at 05:17

## 2021-01-01 RX ADMIN — Medication: at 05:37

## 2021-01-01 RX ADMIN — SODIUM CHLORIDE, PRESERVATIVE FREE 10 ML: 5 INJECTION INTRAVENOUS at 08:39

## 2021-01-01 RX ADMIN — REMDESIVIR 100 MG: 100 INJECTION, POWDER, LYOPHILIZED, FOR SOLUTION INTRAVENOUS at 04:55

## 2021-01-01 RX ADMIN — PANTOPRAZOLE SODIUM 40 MG: 40 INJECTION, POWDER, FOR SOLUTION INTRAVENOUS at 07:49

## 2021-01-01 RX ADMIN — SODIUM CHLORIDE, PRESERVATIVE FREE 10 ML: 5 INJECTION INTRAVENOUS at 21:45

## 2021-01-01 RX ADMIN — NOREPINEPHRINE BITARTRATE 0.02 MCG/KG/MIN: 1 INJECTION INTRAVENOUS at 08:37

## 2021-01-01 RX ADMIN — ASPIRIN 81 MG: 81 TABLET, COATED ORAL at 09:08

## 2021-01-01 RX ADMIN — MICAFUNGIN SODIUM 100 MG: 100 INJECTION, POWDER, LYOPHILIZED, FOR SOLUTION INTRAVENOUS at 12:21

## 2021-01-01 RX ADMIN — INSULIN HUMAN 11.7 UNITS/HR: 1 INJECTION, SOLUTION INTRAVENOUS at 13:38

## 2021-01-01 RX ADMIN — PANTOPRAZOLE SODIUM 40 MG: 40 INJECTION, POWDER, FOR SOLUTION INTRAVENOUS at 21:16

## 2021-01-01 RX ADMIN — Medication: at 04:04

## 2021-01-01 RX ADMIN — DEXMEDETOMIDINE 1.5 MCG/KG/HR: 100 INJECTION, SOLUTION, CONCENTRATE INTRAVENOUS at 06:05

## 2021-01-01 RX ADMIN — Medication 2000 MG: at 20:40

## 2021-01-01 RX ADMIN — VORICONAZOLE 200 MG: 200 TABLET ORAL at 05:38

## 2021-01-01 RX ADMIN — HEPARIN SODIUM 5000 UNITS: 5000 INJECTION INTRAVENOUS; SUBCUTANEOUS at 21:16

## 2021-01-01 RX ADMIN — PANTOPRAZOLE SODIUM 40 MG: 40 INJECTION, POWDER, FOR SOLUTION INTRAVENOUS at 20:23

## 2021-01-01 RX ADMIN — DOCUSATE SODIUM 50 MG AND SENNOSIDES 8.6 MG 1 TABLET: 8.6; 5 TABLET, FILM COATED ORAL at 11:39

## 2021-01-01 RX ADMIN — SODIUM BICARBONATE TAB 650 MG 1300 MG: 650 TAB at 09:00

## 2021-01-01 RX ADMIN — DEXMEDETOMIDINE 1.5 MCG/KG/HR: 100 INJECTION, SOLUTION, CONCENTRATE INTRAVENOUS at 07:28

## 2021-01-01 RX ADMIN — VANCOMYCIN HYDROCHLORIDE 1000 MG: 1 INJECTION, POWDER, LYOPHILIZED, FOR SOLUTION INTRAVENOUS at 14:51

## 2021-01-01 RX ADMIN — HEPARIN SODIUM 5000 UNITS: 5000 INJECTION INTRAVENOUS; SUBCUTANEOUS at 11:31

## 2021-01-01 RX ADMIN — CASTOR OIL AND BALSAM, PERU 1 APPLICATION: 788; 87 OINTMENT TOPICAL at 08:02

## 2021-01-01 RX ADMIN — ASPIRIN 81 MG: 81 TABLET, COATED ORAL at 09:36

## 2021-01-01 RX ADMIN — SODIUM CHLORIDE, PRESERVATIVE FREE 10 ML: 5 INJECTION INTRAVENOUS at 20:30

## 2021-01-01 RX ADMIN — PROPOFOL 50 MCG/KG/MIN: 10 INJECTION, EMULSION INTRAVENOUS at 21:25

## 2021-01-01 RX ADMIN — Medication: at 18:38

## 2021-01-01 RX ADMIN — MEROPENEM 500 MG: 500 INJECTION, POWDER, FOR SOLUTION INTRAVENOUS at 14:35

## 2021-01-01 RX ADMIN — Medication: at 08:19

## 2021-01-01 RX ADMIN — INSULIN ASPART 10 UNITS: 100 INJECTION, SOLUTION INTRAVENOUS; SUBCUTANEOUS at 17:07

## 2021-01-01 RX ADMIN — DOXYCYCLINE 100 MG: 100 INJECTION, POWDER, LYOPHILIZED, FOR SOLUTION INTRAVENOUS at 06:41

## 2021-01-01 RX ADMIN — SODIUM CHLORIDE, PRESERVATIVE FREE 10 ML: 5 INJECTION INTRAVENOUS at 21:27

## 2021-01-01 RX ADMIN — HEPARIN SODIUM 13.8 UNITS/KG/HR: 10000 INJECTION, SOLUTION INTRAVENOUS at 22:34

## 2021-01-01 RX ADMIN — INSULIN DETEMIR 15 UNITS: 100 INJECTION, SOLUTION SUBCUTANEOUS at 22:18

## 2021-01-01 RX ADMIN — MINERAL OIL AND PETROLATUM: 150; 830 OINTMENT OPHTHALMIC at 00:35

## 2021-01-01 RX ADMIN — INSULIN ASPART 3 UNITS: 100 INJECTION, SOLUTION INTRAVENOUS; SUBCUTANEOUS at 13:40

## 2021-01-01 RX ADMIN — PROPOFOL 50 MCG/KG/MIN: 10 INJECTION, EMULSION INTRAVENOUS at 19:34

## 2021-01-01 RX ADMIN — FENTANYL CITRATE: 50 INJECTION INTRAVENOUS at 18:23

## 2021-01-01 RX ADMIN — VECURONIUM BROMIDE 8 MG: 1 INJECTION, POWDER, LYOPHILIZED, FOR SOLUTION INTRAVENOUS at 03:09

## 2021-01-01 RX ADMIN — INSULIN DETEMIR 7 UNITS: 100 INJECTION, SOLUTION SUBCUTANEOUS at 20:24

## 2021-01-01 RX ADMIN — DEXMEDETOMIDINE 1.5 MCG/KG/HR: 100 INJECTION, SOLUTION, CONCENTRATE INTRAVENOUS at 00:44

## 2021-01-01 RX ADMIN — Medication: at 00:28

## 2021-01-01 RX ADMIN — SODIUM CHLORIDE, PRESERVATIVE FREE 10 ML: 5 INJECTION INTRAVENOUS at 08:38

## 2021-01-01 RX ADMIN — CALCIUM GLUCONATE 5 G: 98 INJECTION, SOLUTION INTRAVENOUS at 15:23

## 2021-01-01 RX ADMIN — PROPOFOL 50 MCG/KG/MIN: 10 INJECTION, EMULSION INTRAVENOUS at 16:58

## 2021-01-01 RX ADMIN — MEROPENEM 500 MG: 500 INJECTION, POWDER, FOR SOLUTION INTRAVENOUS at 14:07

## 2021-01-01 RX ADMIN — INSULIN ASPART 8 UNITS: 100 INJECTION, SOLUTION INTRAVENOUS; SUBCUTANEOUS at 06:00

## 2021-01-01 RX ADMIN — PANTOPRAZOLE SODIUM 40 MG: 40 INJECTION, POWDER, FOR SOLUTION INTRAVENOUS at 09:09

## 2021-01-01 RX ADMIN — PROPOFOL 30 MCG/KG/MIN: 10 INJECTION, EMULSION INTRAVENOUS at 21:58

## 2021-01-01 RX ADMIN — DEXMEDETOMIDINE 0.3 MCG/KG/HR: 100 INJECTION, SOLUTION, CONCENTRATE INTRAVENOUS at 17:07

## 2021-01-01 RX ADMIN — DEXMEDETOMIDINE 1.5 MCG/KG/HR: 100 INJECTION, SOLUTION, CONCENTRATE INTRAVENOUS at 16:00

## 2021-01-01 RX ADMIN — Medication 10 MG/HR: at 05:41

## 2021-01-01 RX ADMIN — SODIUM CHLORIDE, PRESERVATIVE FREE 10 ML: 5 INJECTION INTRAVENOUS at 09:00

## 2021-01-01 RX ADMIN — NYSTATIN: 100000 POWDER TOPICAL at 20:13

## 2021-01-01 RX ADMIN — PROPOFOL 20 MCG/KG/MIN: 10 INJECTION, EMULSION INTRAVENOUS at 11:10

## 2021-01-01 RX ADMIN — MEROPENEM 500 MG: 500 INJECTION, POWDER, FOR SOLUTION INTRAVENOUS at 12:21

## 2021-01-01 RX ADMIN — PROPOFOL 50 MCG/KG/MIN: 10 INJECTION, EMULSION INTRAVENOUS at 15:34

## 2021-01-01 RX ADMIN — METOCLOPRAMIDE HYDROCHLORIDE 10 MG: 10 INJECTION, SOLUTION INTRAMUSCULAR; INTRAVENOUS at 06:19

## 2021-01-01 RX ADMIN — DEXTROSE MONOHYDRATE 25 G: 25 INJECTION, SOLUTION INTRAVENOUS at 04:18

## 2021-01-01 RX ADMIN — NYSTATIN: 100000 POWDER TOPICAL at 20:16

## 2021-01-01 RX ADMIN — PROPOFOL 50 MCG/KG/MIN: 10 INJECTION, EMULSION INTRAVENOUS at 20:02

## 2021-01-01 RX ADMIN — PROPOFOL 5 MCG/KG/MIN: 10 INJECTION, EMULSION INTRAVENOUS at 11:52

## 2021-01-01 RX ADMIN — SODIUM CHLORIDE, PRESERVATIVE FREE 10 ML: 5 INJECTION INTRAVENOUS at 21:42

## 2021-01-01 RX ADMIN — DEXMEDETOMIDINE 1.5 MCG/KG/HR: 100 INJECTION, SOLUTION, CONCENTRATE INTRAVENOUS at 14:03

## 2021-01-01 RX ADMIN — Medication: at 09:40

## 2021-01-01 RX ADMIN — SODIUM CHLORIDE, PRESERVATIVE FREE 10 ML: 5 INJECTION INTRAVENOUS at 00:38

## 2021-01-01 RX ADMIN — NYSTATIN: 100000 POWDER TOPICAL at 08:40

## 2021-01-01 RX ADMIN — INSULIN ASPART 3 UNITS: 100 INJECTION, SOLUTION INTRAVENOUS; SUBCUTANEOUS at 06:06

## 2021-01-01 RX ADMIN — CHLORHEXIDINE GLUCONATE 15 ML: 1.2 RINSE ORAL at 21:45

## 2021-01-01 RX ADMIN — Medication: at 12:24

## 2021-01-01 RX ADMIN — MINERAL OIL AND PETROLATUM: 150; 830 OINTMENT OPHTHALMIC at 08:57

## 2021-01-01 RX ADMIN — Medication: at 13:03

## 2021-01-01 RX ADMIN — PIPERACILLIN SODIUM AND TAZOBACTAM SODIUM 3.38 G: 3; .375 INJECTION, POWDER, LYOPHILIZED, FOR SOLUTION INTRAVENOUS at 04:23

## 2021-01-01 RX ADMIN — PANTOPRAZOLE SODIUM 40 MG: 40 INJECTION, POWDER, FOR SOLUTION INTRAVENOUS at 21:40

## 2021-01-01 RX ADMIN — PROPOFOL 50 MCG/KG/MIN: 10 INJECTION, EMULSION INTRAVENOUS at 04:12

## 2021-01-01 RX ADMIN — MINERAL OIL AND PETROLATUM: 150; 830 OINTMENT OPHTHALMIC at 04:20

## 2021-01-01 RX ADMIN — Medication: at 02:57

## 2021-01-01 RX ADMIN — SODIUM BICARBONATE TAB 650 MG 1300 MG: 650 TAB at 17:36

## 2021-01-01 RX ADMIN — PIPERACILLIN SODIUM AND TAZOBACTAM SODIUM 3.38 G: 3; .375 INJECTION, POWDER, LYOPHILIZED, FOR SOLUTION INTRAVENOUS at 11:45

## 2021-01-01 RX ADMIN — PROPOFOL 50 MCG/KG/MIN: 10 INJECTION, EMULSION INTRAVENOUS at 17:34

## 2021-01-01 RX ADMIN — HEPARIN SODIUM 9.8 UNITS/KG/HR: 10000 INJECTION, SOLUTION INTRAVENOUS at 14:36

## 2021-01-01 RX ADMIN — SODIUM BICARBONATE TAB 650 MG 1300 MG: 650 TAB at 16:50

## 2021-01-01 RX ADMIN — PROPOFOL 50 MCG/KG/MIN: 10 INJECTION, EMULSION INTRAVENOUS at 04:47

## 2021-01-01 RX ADMIN — PROPOFOL 20 MCG/KG/MIN: 10 INJECTION, EMULSION INTRAVENOUS at 21:23

## 2021-01-01 RX ADMIN — INSULIN ASPART 2 UNITS: 100 INJECTION, SOLUTION INTRAVENOUS; SUBCUTANEOUS at 18:26

## 2021-01-01 RX ADMIN — INSULIN ASPART 8 UNITS: 100 INJECTION, SOLUTION INTRAVENOUS; SUBCUTANEOUS at 16:36

## 2021-01-01 RX ADMIN — PROPOFOL 30 MCG/KG/MIN: 10 INJECTION, EMULSION INTRAVENOUS at 20:48

## 2021-01-01 RX ADMIN — Medication: at 03:19

## 2021-01-01 RX ADMIN — PROPOFOL 50 MCG/KG/MIN: 10 INJECTION, EMULSION INTRAVENOUS at 23:16

## 2021-01-01 RX ADMIN — MINERAL OIL AND PETROLATUM: 150; 830 OINTMENT OPHTHALMIC at 08:23

## 2021-01-01 RX ADMIN — Medication: at 08:22

## 2021-01-01 RX ADMIN — PANTOPRAZOLE SODIUM 40 MG: 40 INJECTION, POWDER, FOR SOLUTION INTRAVENOUS at 20:13

## 2021-01-01 RX ADMIN — HEPARIN SODIUM 10 UNITS/KG/HR: 10000 INJECTION, SOLUTION INTRAVENOUS at 10:27

## 2021-01-01 RX ADMIN — ALBUMIN HUMAN 25 G: 0.25 SOLUTION INTRAVENOUS at 13:50

## 2021-01-01 RX ADMIN — Medication: at 00:00

## 2021-01-01 RX ADMIN — DEXMEDETOMIDINE 1.5 MCG/KG/HR: 100 INJECTION, SOLUTION, CONCENTRATE INTRAVENOUS at 02:49

## 2021-01-01 RX ADMIN — SODIUM CHLORIDE, PRESERVATIVE FREE 10 ML: 5 INJECTION INTRAVENOUS at 19:50

## 2021-01-01 RX ADMIN — FENTANYL CITRATE: 50 INJECTION, SOLUTION INTRAMUSCULAR; INTRAVENOUS at 08:07

## 2021-01-01 RX ADMIN — SODIUM CHLORIDE, PRESERVATIVE FREE 10 ML: 5 INJECTION INTRAVENOUS at 08:29

## 2021-01-01 RX ADMIN — HEPARIN SODIUM 5000 UNITS: 5000 INJECTION INTRAVENOUS; SUBCUTANEOUS at 14:07

## 2021-01-01 RX ADMIN — INSULIN ASPART 9 UNITS: 100 INJECTION, SOLUTION INTRAVENOUS; SUBCUTANEOUS at 07:51

## 2021-01-01 RX ADMIN — PROPOFOL 50 MCG/KG/MIN: 10 INJECTION, EMULSION INTRAVENOUS at 16:56

## 2021-01-01 RX ADMIN — PROPOFOL 35 MCG/KG/MIN: 10 INJECTION, EMULSION INTRAVENOUS at 10:02

## 2021-01-01 RX ADMIN — ATORVASTATIN CALCIUM 20 MG: 20 TABLET, FILM COATED ORAL at 21:39

## 2021-01-01 RX ADMIN — CHLORHEXIDINE GLUCONATE 15 ML: 1.2 RINSE ORAL at 21:30

## 2021-01-01 RX ADMIN — HUMAN INSULIN 5 UNITS: 100 INJECTION, SOLUTION SUBCUTANEOUS at 10:55

## 2021-01-01 RX ADMIN — SODIUM CHLORIDE, PRESERVATIVE FREE 10 ML: 5 INJECTION INTRAVENOUS at 08:26

## 2021-01-01 RX ADMIN — INSULIN ASPART 10 UNITS: 100 INJECTION, SOLUTION INTRAVENOUS; SUBCUTANEOUS at 12:47

## 2021-01-01 RX ADMIN — PROPOFOL 30 MCG/KG/MIN: 10 INJECTION, EMULSION INTRAVENOUS at 14:42

## 2021-01-01 RX ADMIN — CASTOR OIL AND BALSAM, PERU 1 APPLICATION: 788; 87 OINTMENT TOPICAL at 17:45

## 2021-01-01 RX ADMIN — DEXMEDETOMIDINE 1.5 MCG/KG/HR: 100 INJECTION, SOLUTION, CONCENTRATE INTRAVENOUS at 21:39

## 2021-01-01 RX ADMIN — DEXMEDETOMIDINE 1.5 MCG/KG/HR: 100 INJECTION, SOLUTION, CONCENTRATE INTRAVENOUS at 17:56

## 2021-01-01 RX ADMIN — DEXAMETHASONE SODIUM PHOSPHATE 6 MG: 10 INJECTION INTRAMUSCULAR; INTRAVENOUS at 17:05

## 2021-01-01 RX ADMIN — NYSTATIN: 100000 POWDER TOPICAL at 05:49

## 2021-01-01 RX ADMIN — INSULIN DETEMIR 25 UNITS: 100 INJECTION, SOLUTION SUBCUTANEOUS at 00:28

## 2021-01-01 RX ADMIN — Medication: at 22:54

## 2021-01-01 RX ADMIN — DEXMEDETOMIDINE 1.5 MCG/KG/HR: 100 INJECTION, SOLUTION, CONCENTRATE INTRAVENOUS at 23:30

## 2021-01-01 RX ADMIN — CASTOR OIL AND BALSAM, PERU 1 APPLICATION: 788; 87 OINTMENT TOPICAL at 11:38

## 2021-01-01 RX ADMIN — Medication: at 17:48

## 2021-01-01 RX ADMIN — INSULIN HUMAN 13 UNITS/HR: 1 INJECTION, SOLUTION INTRAVENOUS at 17:48

## 2021-01-01 RX ADMIN — ALBUMIN HUMAN 25 G: 0.25 SOLUTION INTRAVENOUS at 14:00

## 2021-01-01 RX ADMIN — MICAFUNGIN SODIUM 100 MG: 100 INJECTION, POWDER, LYOPHILIZED, FOR SOLUTION INTRAVENOUS at 12:33

## 2021-01-01 RX ADMIN — Medication: at 18:48

## 2021-01-01 RX ADMIN — DEXAMETHASONE SODIUM PHOSPHATE 6 MG: 4 INJECTION, SOLUTION INTRA-ARTICULAR; INTRALESIONAL; INTRAMUSCULAR; INTRAVENOUS; SOFT TISSUE at 16:57

## 2021-01-01 RX ADMIN — DEXMEDETOMIDINE 1.4 MCG/KG/HR: 100 INJECTION, SOLUTION, CONCENTRATE INTRAVENOUS at 13:02

## 2021-01-01 RX ADMIN — SODIUM BICARBONATE TAB 650 MG 1300 MG: 650 TAB at 16:40

## 2021-01-01 RX ADMIN — Medication: at 03:43

## 2021-01-01 RX ADMIN — DEXAMETHASONE SODIUM PHOSPHATE 6 MG: 4 INJECTION, SOLUTION INTRA-ARTICULAR; INTRALESIONAL; INTRAMUSCULAR; INTRAVENOUS; SOFT TISSUE at 06:09

## 2021-01-01 RX ADMIN — KIT FOR THE PREPARATION OF TECHNETIUM TC 99M ALBUMIN AGGREGATED 1 DOSE: 2.5 INJECTION, POWDER, FOR SOLUTION INTRAVENOUS at 17:34

## 2021-01-01 RX ADMIN — CASTOR OIL AND BALSAM, PERU 1 APPLICATION: 788; 87 OINTMENT TOPICAL at 08:28

## 2021-01-01 RX ADMIN — Medication: at 11:52

## 2021-01-01 RX ADMIN — CHLORHEXIDINE GLUCONATE 15 ML: 1.2 RINSE ORAL at 21:27

## 2021-01-01 RX ADMIN — SODIUM CHLORIDE, PRESERVATIVE FREE 3 ML: 5 INJECTION INTRAVENOUS at 12:30

## 2021-01-01 RX ADMIN — PANTOPRAZOLE SODIUM 40 MG: 40 INJECTION, POWDER, FOR SOLUTION INTRAVENOUS at 08:28

## 2021-01-01 RX ADMIN — INSULIN ASPART 5 UNITS: 100 INJECTION, SOLUTION INTRAVENOUS; SUBCUTANEOUS at 01:16

## 2021-01-01 RX ADMIN — NOREPINEPHRINE BITARTRATE 0.02 MCG/KG/MIN: 1 INJECTION INTRAVENOUS at 09:50

## 2021-01-01 RX ADMIN — CEFTRIAXONE 1 G: 1 INJECTION, POWDER, FOR SOLUTION INTRAMUSCULAR; INTRAVENOUS at 06:41

## 2021-01-01 RX ADMIN — POTASSIUM CHLORIDE, DEXTROSE MONOHYDRATE AND SODIUM CHLORIDE 150 ML/HR: 150; 5; 450 INJECTION, SOLUTION INTRAVENOUS at 08:13

## 2021-01-01 RX ADMIN — SODIUM BICARBONATE 150 MEQ: 84 INJECTION, SOLUTION INTRAVENOUS at 15:20

## 2021-01-01 RX ADMIN — CALCIUM GLUCONATE 2 G: 98 INJECTION, SOLUTION INTRAVENOUS at 11:59

## 2021-01-01 RX ADMIN — HEPARIN SODIUM 16.8 UNITS/KG/HR: 10000 INJECTION, SOLUTION INTRAVENOUS at 00:49

## 2021-01-01 RX ADMIN — Medication 10 MG/HR: at 17:34

## 2021-01-01 RX ADMIN — INSULIN DETEMIR 7 UNITS: 100 INJECTION, SOLUTION SUBCUTANEOUS at 22:47

## 2021-01-01 RX ADMIN — INSULIN DETEMIR 15 UNITS: 100 INJECTION, SOLUTION SUBCUTANEOUS at 07:46

## 2021-01-01 RX ADMIN — POLYETHYLENE GLYCOL (3350) 17 G: 17 POWDER, FOR SOLUTION ORAL at 09:36

## 2021-01-01 RX ADMIN — DEXTROSE MONOHYDRATE 25 G: 25 INJECTION, SOLUTION INTRAVENOUS at 05:42

## 2021-01-01 RX ADMIN — MINERAL OIL AND PETROLATUM: 150; 830 OINTMENT OPHTHALMIC at 12:05

## 2021-01-01 RX ADMIN — Medication: at 07:28

## 2021-01-01 RX ADMIN — SODIUM CHLORIDE, PRESERVATIVE FREE 10 ML: 5 INJECTION INTRAVENOUS at 20:12

## 2021-01-01 RX ADMIN — NYSTATIN: 100000 POWDER TOPICAL at 21:40

## 2021-01-01 RX ADMIN — MICAFUNGIN SODIUM 100 MG: 100 INJECTION, POWDER, LYOPHILIZED, FOR SOLUTION INTRAVENOUS at 11:46

## 2021-01-01 RX ADMIN — VORICONAZOLE 200 MG: 200 TABLET ORAL at 16:50

## 2021-01-01 RX ADMIN — INSULIN DETEMIR 7 UNITS: 100 INJECTION, SOLUTION SUBCUTANEOUS at 21:47

## 2021-01-01 RX ADMIN — MICAFUNGIN SODIUM 100 MG: 100 INJECTION, POWDER, LYOPHILIZED, FOR SOLUTION INTRAVENOUS at 12:24

## 2021-01-01 RX ADMIN — INSULIN ASPART 5 UNITS: 100 INJECTION, SOLUTION INTRAVENOUS; SUBCUTANEOUS at 00:49

## 2021-01-01 RX ADMIN — REMDESIVIR 200 MG: 100 INJECTION, POWDER, LYOPHILIZED, FOR SOLUTION INTRAVENOUS at 06:42

## 2021-01-01 RX ADMIN — PROPOFOL 50 MCG/KG/MIN: 10 INJECTION, EMULSION INTRAVENOUS at 11:34

## 2021-01-01 RX ADMIN — INSULIN ASPART 3 UNITS: 100 INJECTION, SOLUTION INTRAVENOUS; SUBCUTANEOUS at 05:50

## 2021-01-01 RX ADMIN — PANTOPRAZOLE SODIUM 40 MG: 40 INJECTION, POWDER, FOR SOLUTION INTRAVENOUS at 09:48

## 2021-01-01 RX ADMIN — HEPARIN SODIUM 15.8 UNITS/KG/HR: 10000 INJECTION, SOLUTION INTRAVENOUS at 14:37

## 2021-01-01 RX ADMIN — INSULIN ASPART 3 UNITS: 100 INJECTION, SOLUTION INTRAVENOUS; SUBCUTANEOUS at 11:59

## 2021-01-01 RX ADMIN — INSULIN DETEMIR 25 UNITS: 100 INJECTION, SOLUTION SUBCUTANEOUS at 20:15

## 2021-01-01 RX ADMIN — INSULIN DETEMIR 30 UNITS: 100 INJECTION, SOLUTION SUBCUTANEOUS at 13:31

## 2021-01-01 RX ADMIN — PROPOFOL 50 MCG/KG/MIN: 10 INJECTION, EMULSION INTRAVENOUS at 06:55

## 2021-01-01 RX ADMIN — INSULIN ASPART 5 UNITS: 100 INJECTION, SOLUTION INTRAVENOUS; SUBCUTANEOUS at 11:35

## 2021-01-01 RX ADMIN — PROPOFOL 50 MCG/KG/MIN: 10 INJECTION, EMULSION INTRAVENOUS at 07:53

## 2021-01-01 RX ADMIN — INSULIN ASPART 2 UNITS: 100 INJECTION, SOLUTION INTRAVENOUS; SUBCUTANEOUS at 18:04

## 2021-01-01 RX ADMIN — MINERAL OIL AND PETROLATUM: 150; 830 OINTMENT OPHTHALMIC at 04:05

## 2021-01-01 RX ADMIN — VORICONAZOLE 200 MG: 200 TABLET ORAL at 17:08

## 2021-01-01 RX ADMIN — MINERAL OIL AND PETROLATUM: 150; 830 OINTMENT OPHTHALMIC at 12:21

## 2021-01-01 RX ADMIN — DEXMEDETOMIDINE 1.5 MCG/KG/HR: 100 INJECTION, SOLUTION, CONCENTRATE INTRAVENOUS at 09:49

## 2021-01-01 RX ADMIN — SODIUM CHLORIDE, PRESERVATIVE FREE 10 ML: 5 INJECTION INTRAVENOUS at 08:28

## 2021-01-01 RX ADMIN — SODIUM CHLORIDE, PRESERVATIVE FREE 3 ML: 5 INJECTION INTRAVENOUS at 20:30

## 2021-01-01 RX ADMIN — INSULIN ASPART 3 UNITS: 100 INJECTION, SOLUTION INTRAVENOUS; SUBCUTANEOUS at 03:32

## 2021-01-01 RX ADMIN — CLINDAMYCIN IN 5 PERCENT DEXTROSE 600 MG: 12 INJECTION, SOLUTION INTRAVENOUS at 11:40

## 2021-01-01 RX ADMIN — NOREPINEPHRINE BITARTRATE 0.02 MCG/KG/MIN: 1 INJECTION, SOLUTION, CONCENTRATE INTRAVENOUS at 15:09

## 2021-01-01 RX ADMIN — INSULIN ASPART 8 UNITS: 100 INJECTION, SOLUTION INTRAVENOUS; SUBCUTANEOUS at 00:36

## 2021-01-01 RX ADMIN — DEXAMETHASONE SODIUM PHOSPHATE 6 MG: 10 INJECTION INTRAMUSCULAR; INTRAVENOUS at 18:37

## 2021-01-01 RX ADMIN — SODIUM BICARBONATE TAB 650 MG 1300 MG: 650 TAB at 16:48

## 2021-01-01 RX ADMIN — CHLORHEXIDINE GLUCONATE 15 ML: 1.2 RINSE ORAL at 20:24

## 2021-01-01 RX ADMIN — PROPOFOL 45 MCG/KG/MIN: 10 INJECTION, EMULSION INTRAVENOUS at 20:23

## 2021-01-01 RX ADMIN — Medication: at 04:53

## 2021-01-01 RX ADMIN — ACETAMINOPHEN 650 MG: 325 TABLET ORAL at 04:53

## 2021-01-01 RX ADMIN — HEPARIN SODIUM 13.8 UNITS/KG/HR: 10000 INJECTION, SOLUTION INTRAVENOUS at 01:24

## 2021-01-01 RX ADMIN — DEXMEDETOMIDINE 1.5 MCG/KG/HR: 100 INJECTION, SOLUTION, CONCENTRATE INTRAVENOUS at 14:04

## 2021-01-01 RX ADMIN — FENTANYL CITRATE: 50 INJECTION INTRAVENOUS at 02:51

## 2021-01-01 RX ADMIN — MINERAL OIL AND PETROLATUM: 150; 830 OINTMENT OPHTHALMIC at 19:34

## 2021-01-01 RX ADMIN — VANCOMYCIN HYDROCHLORIDE 1000 MG: 1 INJECTION, POWDER, LYOPHILIZED, FOR SOLUTION INTRAVENOUS at 17:50

## 2021-01-01 RX ADMIN — ACETAMINOPHEN 650 MG: 325 TABLET ORAL at 22:23

## 2021-01-01 RX ADMIN — DEXMEDETOMIDINE 1.5 MCG/KG/HR: 100 INJECTION, SOLUTION, CONCENTRATE INTRAVENOUS at 01:01

## 2021-01-01 RX ADMIN — FENTANYL CITRATE: 50 INJECTION INTRAVENOUS at 13:30

## 2021-01-01 RX ADMIN — PROPOFOL 50 MCG/KG/MIN: 10 INJECTION, EMULSION INTRAVENOUS at 11:25

## 2021-01-01 RX ADMIN — INSULIN ASPART 8 UNITS: 100 INJECTION, SOLUTION INTRAVENOUS; SUBCUTANEOUS at 00:27

## 2021-01-01 RX ADMIN — MINERAL OIL AND PETROLATUM: 150; 830 OINTMENT OPHTHALMIC at 16:51

## 2021-01-01 RX ADMIN — MINERAL OIL AND PETROLATUM: 150; 830 OINTMENT OPHTHALMIC at 15:23

## 2021-01-01 RX ADMIN — INSULIN DETEMIR 20 UNITS: 100 INJECTION, SOLUTION SUBCUTANEOUS at 08:54

## 2021-01-01 RX ADMIN — CASTOR OIL AND BALSAM, PERU 1 APPLICATION: 788; 87 OINTMENT TOPICAL at 08:14

## 2021-01-01 RX ADMIN — SODIUM BICARBONATE: 84 INJECTION, SOLUTION INTRAVENOUS at 11:14

## 2021-01-01 RX ADMIN — MICAFUNGIN SODIUM 100 MG: 100 INJECTION, POWDER, LYOPHILIZED, FOR SOLUTION INTRAVENOUS at 13:03

## 2021-01-01 RX ADMIN — HEPARIN SODIUM 15.8 UNITS/KG/HR: 10000 INJECTION, SOLUTION INTRAVENOUS at 08:22

## 2021-01-01 RX ADMIN — SODIUM BICARBONATE TAB 650 MG 1300 MG: 650 TAB at 15:55

## 2021-01-01 RX ADMIN — VECURONIUM BROMIDE 0.6 MCG/KG/MIN: 1 INJECTION, POWDER, LYOPHILIZED, FOR SOLUTION INTRAVENOUS at 01:25

## 2021-01-01 RX ADMIN — HEPARIN SODIUM 10 UNITS/KG/HR: 10000 INJECTION, SOLUTION INTRAVENOUS at 06:27

## 2021-01-01 RX ADMIN — SODIUM CHLORIDE 1000 ML: 9 INJECTION, SOLUTION INTRAVENOUS at 18:49

## 2021-01-01 RX ADMIN — SODIUM BICARBONATE TAB 650 MG 1300 MG: 650 TAB at 11:39

## 2021-01-01 RX ADMIN — PROPOFOL 50 MCG/KG/MIN: 10 INJECTION, EMULSION INTRAVENOUS at 13:18

## 2021-01-01 RX ADMIN — DEXMEDETOMIDINE 1.5 MCG/KG/HR: 100 INJECTION, SOLUTION, CONCENTRATE INTRAVENOUS at 15:40

## 2021-01-01 RX ADMIN — ALBUMIN HUMAN 25 G: 0.25 SOLUTION INTRAVENOUS at 13:45

## 2021-01-01 RX ADMIN — NOREPINEPHRINE BITARTRATE 0.02 MCG/KG/MIN: 1 INJECTION INTRAVENOUS at 09:46

## 2021-01-01 RX ADMIN — PROPOFOL 50 MCG/KG/MIN: 10 INJECTION, EMULSION INTRAVENOUS at 16:50

## 2021-01-01 RX ADMIN — PROPOFOL 50 MCG/KG/MIN: 10 INJECTION, EMULSION INTRAVENOUS at 21:44

## 2021-01-01 RX ADMIN — FENTANYL CITRATE: 50 INJECTION, SOLUTION INTRAMUSCULAR; INTRAVENOUS at 15:54

## 2021-01-01 RX ADMIN — INSULIN DETEMIR 20 UNITS: 100 INJECTION, SOLUTION SUBCUTANEOUS at 20:53

## 2021-01-01 RX ADMIN — DEXAMETHASONE SODIUM PHOSPHATE 6 MG: 4 INJECTION, SOLUTION INTRA-ARTICULAR; INTRALESIONAL; INTRAMUSCULAR; INTRAVENOUS; SOFT TISSUE at 17:36

## 2021-01-01 RX ADMIN — PIPERACILLIN SODIUM AND TAZOBACTAM SODIUM 3.38 G: 3; .375 INJECTION, POWDER, LYOPHILIZED, FOR SOLUTION INTRAVENOUS at 11:14

## 2021-01-01 RX ADMIN — SODIUM CHLORIDE, PRESERVATIVE FREE 10 ML: 5 INJECTION INTRAVENOUS at 09:41

## 2021-01-01 RX ADMIN — INSULIN DETEMIR 7 UNITS: 100 INJECTION, SOLUTION SUBCUTANEOUS at 21:25

## 2021-01-01 RX ADMIN — HEPARIN SODIUM 15.69 UNITS/KG/HR: 10000 INJECTION, SOLUTION INTRAVENOUS at 09:31

## 2021-01-01 RX ADMIN — PROPOFOL 50 MCG/KG/MIN: 10 INJECTION, EMULSION INTRAVENOUS at 12:26

## 2021-01-01 RX ADMIN — PROPOFOL 50 MCG/KG/MIN: 10 INJECTION, EMULSION INTRAVENOUS at 05:22

## 2021-01-01 RX ADMIN — CLINDAMYCIN IN 5 PERCENT DEXTROSE 600 MG: 12 INJECTION, SOLUTION INTRAVENOUS at 03:08

## 2021-01-01 RX ADMIN — CHLORHEXIDINE GLUCONATE 15 ML: 1.2 RINSE ORAL at 08:41

## 2021-01-01 RX ADMIN — INSULIN ASPART 5 UNITS: 100 INJECTION, SOLUTION INTRAVENOUS; SUBCUTANEOUS at 17:49

## 2021-01-01 RX ADMIN — SODIUM BICARBONATE 150 MEQ: 84 INJECTION, SOLUTION INTRAVENOUS at 00:15

## 2021-01-01 RX ADMIN — DEXMEDETOMIDINE 1.5 MCG/KG/HR: 100 INJECTION, SOLUTION, CONCENTRATE INTRAVENOUS at 13:22

## 2021-01-01 RX ADMIN — MINERAL OIL AND PETROLATUM: 150; 830 OINTMENT OPHTHALMIC at 21:25

## 2021-01-01 RX ADMIN — PANTOPRAZOLE SODIUM 40 MG: 40 INJECTION, POWDER, FOR SOLUTION INTRAVENOUS at 19:49

## 2021-01-01 RX ADMIN — INSULIN ASPART 6 UNITS: 100 INJECTION, SOLUTION INTRAVENOUS; SUBCUTANEOUS at 18:01

## 2021-01-01 RX ADMIN — NYSTATIN: 100000 POWDER TOPICAL at 21:44

## 2021-01-01 RX ADMIN — DEXMEDETOMIDINE 1.5 MCG/KG/HR: 100 INJECTION, SOLUTION, CONCENTRATE INTRAVENOUS at 06:47

## 2021-01-01 RX ADMIN — DEXMEDETOMIDINE 1.5 MCG/KG/HR: 100 INJECTION, SOLUTION, CONCENTRATE INTRAVENOUS at 21:44

## 2021-01-01 RX ADMIN — DEXAMETHASONE SODIUM PHOSPHATE 6 MG: 10 INJECTION INTRAMUSCULAR; INTRAVENOUS at 04:54

## 2021-01-01 RX ADMIN — HEPARIN SODIUM 5000 UNITS: 5000 INJECTION INTRAVENOUS; SUBCUTANEOUS at 21:47

## 2021-01-01 RX ADMIN — SODIUM BICARBONATE TAB 650 MG 1300 MG: 650 TAB at 21:42

## 2021-01-01 RX ADMIN — PROPOFOL 50 MCG/KG/MIN: 10 INJECTION, EMULSION INTRAVENOUS at 00:02

## 2021-01-01 RX ADMIN — SODIUM BICARBONATE TAB 650 MG 1300 MG: 650 TAB at 08:38

## 2021-01-01 RX ADMIN — MINERAL OIL AND PETROLATUM: 150; 830 OINTMENT OPHTHALMIC at 05:16

## 2021-01-01 RX ADMIN — DEXMEDETOMIDINE 0.2 MCG/KG/HR: 100 INJECTION, SOLUTION, CONCENTRATE INTRAVENOUS at 16:11

## 2021-01-01 RX ADMIN — LORAZEPAM 0.5 MG: 2 INJECTION INTRAMUSCULAR; INTRAVENOUS at 09:26

## 2021-01-01 RX ADMIN — FENTANYL CITRATE: 50 INJECTION, SOLUTION INTRAMUSCULAR; INTRAVENOUS at 01:55

## 2021-01-01 RX ADMIN — SODIUM CHLORIDE, PRESERVATIVE FREE 10 ML: 5 INJECTION INTRAVENOUS at 09:19

## 2021-01-01 RX ADMIN — SODIUM BICARBONATE 75 MEQ: 84 INJECTION, SOLUTION INTRAVENOUS at 11:53

## 2021-01-01 RX ADMIN — SODIUM CHLORIDE, PRESERVATIVE FREE 10 ML: 5 INJECTION INTRAVENOUS at 20:47

## 2021-01-01 RX ADMIN — INSULIN DETEMIR 15 UNITS: 100 INJECTION, SOLUTION SUBCUTANEOUS at 21:42

## 2021-01-01 RX ADMIN — PROPOFOL 50 MCG/KG/MIN: 10 INJECTION, EMULSION INTRAVENOUS at 04:14

## 2021-01-01 RX ADMIN — DEXTROSE MONOHYDRATE 10 ML: 500 INJECTION PARENTERAL at 21:27

## 2021-01-01 RX ADMIN — DEXMEDETOMIDINE 1.5 MCG/KG/HR: 100 INJECTION, SOLUTION, CONCENTRATE INTRAVENOUS at 04:04

## 2021-01-01 RX ADMIN — PIPERACILLIN SODIUM AND TAZOBACTAM SODIUM 3.38 G: 3; .375 INJECTION, POWDER, LYOPHILIZED, FOR SOLUTION INTRAVENOUS at 11:32

## 2021-01-01 RX ADMIN — PROPOFOL 50 MCG/KG/MIN: 10 INJECTION, EMULSION INTRAVENOUS at 16:10

## 2021-01-01 RX ADMIN — PROPOFOL 50 MCG/KG/MIN: 10 INJECTION, EMULSION INTRAVENOUS at 08:18

## 2021-01-01 RX ADMIN — INSULIN ASPART 14 UNITS: 100 INJECTION, SOLUTION INTRAVENOUS; SUBCUTANEOUS at 11:00

## 2021-01-01 RX ADMIN — Medication 10 MG/HR: at 09:09

## 2021-01-01 RX ADMIN — ALBUMIN HUMAN 25 G: 0.25 SOLUTION INTRAVENOUS at 15:00

## 2021-01-01 RX ADMIN — FENTANYL CITRATE: 50 INJECTION, SOLUTION INTRAMUSCULAR; INTRAVENOUS at 02:44

## 2021-01-01 RX ADMIN — INSULIN ASPART 8 UNITS: 100 INJECTION, SOLUTION INTRAVENOUS; SUBCUTANEOUS at 18:10

## 2021-01-01 RX ADMIN — DEXMEDETOMIDINE 1.5 MCG/KG/HR: 100 INJECTION, SOLUTION, CONCENTRATE INTRAVENOUS at 11:25

## 2021-01-01 RX ADMIN — DEXMEDETOMIDINE 1.5 MCG/KG/HR: 100 INJECTION, SOLUTION, CONCENTRATE INTRAVENOUS at 19:59

## 2021-01-01 RX ADMIN — CHLORHEXIDINE GLUCONATE 15 ML: 1.2 RINSE ORAL at 11:38

## 2021-01-01 RX ADMIN — MINERAL OIL AND PETROLATUM: 150; 830 OINTMENT OPHTHALMIC at 09:37

## 2021-01-01 RX ADMIN — DEXMEDETOMIDINE 1.5 MCG/KG/HR: 100 INJECTION, SOLUTION, CONCENTRATE INTRAVENOUS at 16:35

## 2021-01-01 RX ADMIN — Medication: at 02:17

## 2021-01-01 RX ADMIN — MICAFUNGIN SODIUM 100 MG: 100 INJECTION, POWDER, LYOPHILIZED, FOR SOLUTION INTRAVENOUS at 11:33

## 2021-01-01 RX ADMIN — PANTOPRAZOLE SODIUM 40 MG: 40 INJECTION, POWDER, FOR SOLUTION INTRAVENOUS at 20:46

## 2021-01-01 RX ADMIN — Medication: at 10:35

## 2021-01-01 RX ADMIN — SODIUM BICARBONATE TAB 650 MG 1300 MG: 650 TAB at 08:23

## 2021-01-01 RX ADMIN — MEROPENEM 500 MG: 500 INJECTION, POWDER, FOR SOLUTION INTRAVENOUS at 13:25

## 2021-01-01 RX ADMIN — NYSTATIN: 100000 POWDER TOPICAL at 09:17

## 2021-01-01 RX ADMIN — Medication: at 15:14

## 2021-01-01 RX ADMIN — SODIUM PHOSPHATE, MONOBASIC, MONOHYDRATE AND SODIUM PHOSPHATE, DIBASIC, ANHYDROUS 30 MMOL: 276; 142 INJECTION, SOLUTION INTRAVENOUS at 08:36

## 2021-01-01 RX ADMIN — PROPOFOL 50 MCG/KG/MIN: 10 INJECTION, EMULSION INTRAVENOUS at 06:29

## 2021-01-01 RX ADMIN — HEPARIN SODIUM 5000 UNITS: 5000 INJECTION INTRAVENOUS; SUBCUTANEOUS at 16:41

## 2021-01-01 RX ADMIN — FENTANYL CITRATE 50 MCG: 50 INJECTION INTRAMUSCULAR; INTRAVENOUS at 09:30

## 2021-01-01 RX ADMIN — HEPARIN SODIUM 5000 UNITS: 5000 INJECTION INTRAVENOUS; SUBCUTANEOUS at 14:39

## 2021-01-01 RX ADMIN — SODIUM BICARBONATE TAB 650 MG 1300 MG: 650 TAB at 09:16

## 2021-01-01 RX ADMIN — DOCUSATE SODIUM 50 MG AND SENNOSIDES 8.6 MG 1 TABLET: 8.6; 5 TABLET, FILM COATED ORAL at 08:03

## 2021-01-01 RX ADMIN — Medication: at 22:10

## 2021-01-01 RX ADMIN — PROPOFOL 40 MCG/KG/MIN: 10 INJECTION, EMULSION INTRAVENOUS at 19:34

## 2021-01-01 RX ADMIN — DEXAMETHASONE SODIUM PHOSPHATE 6 MG: 4 INJECTION, SOLUTION INTRA-ARTICULAR; INTRALESIONAL; INTRAMUSCULAR; INTRAVENOUS; SOFT TISSUE at 05:22

## 2021-01-01 RX ADMIN — HEPARIN SODIUM 18.8 UNITS/KG/HR: 10000 INJECTION, SOLUTION INTRAVENOUS at 04:47

## 2021-01-01 RX ADMIN — PROPOFOL 50 MCG/KG/MIN: 10 INJECTION, EMULSION INTRAVENOUS at 06:03

## 2021-01-01 RX ADMIN — PROPOFOL 50 MCG/KG/MIN: 10 INJECTION, EMULSION INTRAVENOUS at 21:38

## 2021-01-01 RX ADMIN — FENTANYL CITRATE: 50 INJECTION INTRAVENOUS at 10:50

## 2021-01-01 RX ADMIN — DEXMEDETOMIDINE 1.5 MCG/KG/HR: 100 INJECTION, SOLUTION, CONCENTRATE INTRAVENOUS at 04:23

## 2021-01-01 RX ADMIN — POLYETHYLENE GLYCOL (3350) 17 G: 17 POWDER, FOR SOLUTION ORAL at 17:12

## 2021-01-01 RX ADMIN — SODIUM BICARBONATE TAB 650 MG 1300 MG: 650 TAB at 08:01

## 2021-01-01 RX ADMIN — DEXTROSE MONOHYDRATE 10 ML: 500 INJECTION PARENTERAL at 01:45

## 2021-01-01 RX ADMIN — CHLORHEXIDINE GLUCONATE 15 ML: 1.2 RINSE ORAL at 20:47

## 2021-01-01 RX ADMIN — PROPOFOL 35 MCG/KG/MIN: 10 INJECTION, EMULSION INTRAVENOUS at 15:40

## 2021-01-01 RX ADMIN — DEXAMETHASONE SODIUM PHOSPHATE 6 MG: 4 INJECTION, SOLUTION INTRA-ARTICULAR; INTRALESIONAL; INTRAMUSCULAR; INTRAVENOUS; SOFT TISSUE at 17:10

## 2021-01-01 RX ADMIN — DEXMEDETOMIDINE 1.5 MCG/KG/HR: 100 INJECTION, SOLUTION, CONCENTRATE INTRAVENOUS at 20:13

## 2021-01-01 RX ADMIN — DEXMEDETOMIDINE 1.5 MCG/KG/HR: 100 INJECTION, SOLUTION, CONCENTRATE INTRAVENOUS at 22:12

## 2021-01-01 RX ADMIN — PROPOFOL 50 MCG/KG/MIN: 10 INJECTION, EMULSION INTRAVENOUS at 12:34

## 2021-01-01 RX ADMIN — CASTOR OIL AND BALSAM, PERU 1 APPLICATION: 788; 87 OINTMENT TOPICAL at 08:15

## 2021-01-01 RX ADMIN — VORICONAZOLE 200 MG: 200 TABLET ORAL at 08:23

## 2021-01-01 RX ADMIN — DEXAMETHASONE SODIUM PHOSPHATE 6 MG: 4 INJECTION, SOLUTION INTRA-ARTICULAR; INTRALESIONAL; INTRAMUSCULAR; INTRAVENOUS; SOFT TISSUE at 16:40

## 2021-01-01 RX ADMIN — DEXMEDETOMIDINE 1.5 MCG/KG/HR: 100 INJECTION, SOLUTION, CONCENTRATE INTRAVENOUS at 03:25

## 2021-01-01 RX ADMIN — DEXAMETHASONE SODIUM PHOSPHATE 6 MG: 4 INJECTION, SOLUTION INTRA-ARTICULAR; INTRALESIONAL; INTRAMUSCULAR; INTRAVENOUS; SOFT TISSUE at 18:40

## 2021-01-01 RX ADMIN — DEXMEDETOMIDINE 1.5 MCG/KG/HR: 100 INJECTION, SOLUTION, CONCENTRATE INTRAVENOUS at 18:21

## 2021-01-01 RX ADMIN — NYSTATIN: 100000 POWDER TOPICAL at 09:04

## 2021-01-01 RX ADMIN — Medication: at 01:24

## 2021-01-01 RX ADMIN — DOCUSATE SODIUM 50 MG AND SENNOSIDES 8.6 MG 1 TABLET: 8.6; 5 TABLET, FILM COATED ORAL at 20:23

## 2021-01-01 RX ADMIN — PROPOFOL 50 MCG/KG/MIN: 10 INJECTION, EMULSION INTRAVENOUS at 09:09

## 2021-01-01 RX ADMIN — Medication: at 22:34

## 2021-01-01 RX ADMIN — SODIUM BICARBONATE TAB 650 MG 1300 MG: 650 TAB at 08:46

## 2021-01-01 RX ADMIN — INSULIN HUMAN 20 UNITS/HR: 1 INJECTION, SOLUTION INTRAVENOUS at 09:22

## 2021-01-01 RX ADMIN — DEXMEDETOMIDINE 1.5 MCG/KG/HR: 100 INJECTION, SOLUTION, CONCENTRATE INTRAVENOUS at 19:52

## 2021-01-01 RX ADMIN — DEXAMETHASONE SODIUM PHOSPHATE 6 MG: 10 INJECTION INTRAMUSCULAR; INTRAVENOUS at 16:44

## 2021-01-01 RX ADMIN — FENTANYL CITRATE: 50 INJECTION INTRAVENOUS at 23:29

## 2021-01-01 RX ADMIN — DEXAMETHASONE SODIUM PHOSPHATE 6 MG: 10 INJECTION INTRAMUSCULAR; INTRAVENOUS at 05:00

## 2021-01-01 RX ADMIN — CHLORHEXIDINE GLUCONATE 15 ML: 1.2 RINSE ORAL at 09:39

## 2021-01-01 RX ADMIN — DEXTROSE MONOHYDRATE 25 G: 500 INJECTION PARENTERAL at 05:41

## 2021-01-01 RX ADMIN — MEROPENEM 500 MG: 500 INJECTION, POWDER, FOR SOLUTION INTRAVENOUS at 01:46

## 2021-01-01 RX ADMIN — PROPOFOL 50 MCG/KG/MIN: 10 INJECTION, EMULSION INTRAVENOUS at 02:23

## 2021-01-01 RX ADMIN — PROPOFOL 35 MCG/KG/MIN: 10 INJECTION, EMULSION INTRAVENOUS at 06:32

## 2021-01-01 RX ADMIN — SODIUM BICARBONATE TAB 650 MG 1300 MG: 650 TAB at 09:08

## 2021-01-01 RX ADMIN — SODIUM CHLORIDE, PRESERVATIVE FREE 10 ML: 5 INJECTION INTRAVENOUS at 09:17

## 2021-01-01 RX ADMIN — HEPARIN SODIUM 15.8 UNITS/KG/HR: 10000 INJECTION, SOLUTION INTRAVENOUS at 16:58

## 2021-01-01 RX ADMIN — VORICONAZOLE 200 MG: 200 TABLET ORAL at 05:22

## 2021-01-01 RX ADMIN — PIPERACILLIN SODIUM AND TAZOBACTAM SODIUM 3.38 G: 3; .375 INJECTION, POWDER, LYOPHILIZED, FOR SOLUTION INTRAVENOUS at 20:31

## 2021-01-01 RX ADMIN — CHLORHEXIDINE GLUCONATE 15 ML: 1.2 RINSE ORAL at 09:03

## 2021-01-01 RX ADMIN — VECURONIUM BROMIDE 8 MG: 1 INJECTION, POWDER, LYOPHILIZED, FOR SOLUTION INTRAVENOUS at 22:04

## 2021-01-01 RX ADMIN — INSULIN ASPART 7 UNITS: 100 INJECTION, SOLUTION INTRAVENOUS; SUBCUTANEOUS at 05:34

## 2021-01-01 RX ADMIN — HEPARIN SODIUM 15 UNITS/KG/HR: 10000 INJECTION, SOLUTION INTRAVENOUS at 16:48

## 2021-01-01 RX ADMIN — PANTOPRAZOLE SODIUM 40 MG: 40 INJECTION, POWDER, FOR SOLUTION INTRAVENOUS at 08:46

## 2021-01-01 RX ADMIN — PROPOFOL 30 MCG/KG/MIN: 10 INJECTION, EMULSION INTRAVENOUS at 05:21

## 2021-01-01 RX ADMIN — INSULIN DETEMIR 7 UNITS: 100 INJECTION, SOLUTION SUBCUTANEOUS at 08:25

## 2021-01-01 RX ADMIN — CHLORHEXIDINE GLUCONATE 15 ML: 1.2 RINSE ORAL at 08:28

## 2021-01-01 RX ADMIN — PANTOPRAZOLE SODIUM 40 MG: 40 INJECTION, POWDER, FOR SOLUTION INTRAVENOUS at 20:31

## 2021-01-01 RX ADMIN — DEXTROSE MONOHYDRATE 25 G: 500 INJECTION PARENTERAL at 11:40

## 2021-01-01 RX ADMIN — DEXMEDETOMIDINE 1.5 MCG/KG/HR: 100 INJECTION, SOLUTION, CONCENTRATE INTRAVENOUS at 14:38

## 2021-01-01 RX ADMIN — PANTOPRAZOLE SODIUM 40 MG: 40 INJECTION, POWDER, FOR SOLUTION INTRAVENOUS at 08:02

## 2021-01-01 RX ADMIN — SODIUM BICARBONATE TAB 650 MG 1300 MG: 650 TAB at 07:49

## 2021-01-01 RX ADMIN — POLYETHYLENE GLYCOL (3350) 17 G: 17 POWDER, FOR SOLUTION ORAL at 09:02

## 2021-01-01 RX ADMIN — SODIUM CHLORIDE, PRESERVATIVE FREE 10 ML: 5 INJECTION INTRAVENOUS at 09:23

## 2021-01-01 RX ADMIN — FENTANYL CITRATE 50 MCG: 50 INJECTION INTRAMUSCULAR; INTRAVENOUS at 13:45

## 2021-01-01 RX ADMIN — SODIUM BICARBONATE TAB 650 MG 1300 MG: 650 TAB at 21:30

## 2021-01-01 RX ADMIN — PROPOFOL 35 MCG/KG/MIN: 10 INJECTION, EMULSION INTRAVENOUS at 02:17

## 2021-01-01 RX ADMIN — VECURONIUM BROMIDE 0.6 MCG/KG/MIN: 1 INJECTION, POWDER, LYOPHILIZED, FOR SOLUTION INTRAVENOUS at 13:21

## 2021-01-01 RX ADMIN — DEXAMETHASONE SODIUM PHOSPHATE 6 MG: 4 INJECTION, SOLUTION INTRA-ARTICULAR; INTRALESIONAL; INTRAMUSCULAR; INTRAVENOUS; SOFT TISSUE at 05:45

## 2021-01-01 RX ADMIN — Medication: at 22:53

## 2021-01-01 RX ADMIN — MINERAL OIL AND PETROLATUM: 150; 830 OINTMENT OPHTHALMIC at 01:10

## 2021-01-01 RX ADMIN — NYSTATIN: 100000 POWDER TOPICAL at 22:19

## 2021-01-01 RX ADMIN — DEXAMETHASONE SODIUM PHOSPHATE 6 MG: 4 INJECTION, SOLUTION INTRA-ARTICULAR; INTRALESIONAL; INTRAMUSCULAR; INTRAVENOUS; SOFT TISSUE at 04:20

## 2021-01-01 RX ADMIN — SODIUM BICARBONATE TAB 650 MG 1300 MG: 650 TAB at 09:04

## 2021-01-01 RX ADMIN — SODIUM BICARBONATE TAB 650 MG 1300 MG: 650 TAB at 21:44

## 2021-01-01 RX ADMIN — MINERAL OIL AND PETROLATUM: 150; 830 OINTMENT OPHTHALMIC at 16:12

## 2021-01-01 RX ADMIN — SODIUM BICARBONATE TAB 650 MG 650 MG: 650 TAB at 19:49

## 2021-01-01 RX ADMIN — FUROSEMIDE 80 MG: 10 INJECTION INTRAMUSCULAR; INTRAVENOUS at 13:43

## 2021-01-01 RX ADMIN — Medication: at 18:20

## 2021-01-01 RX ADMIN — PROPOFOL 50 MCG/KG/MIN: 10 INJECTION, EMULSION INTRAVENOUS at 09:19

## 2021-01-01 RX ADMIN — MAGNESIUM SULFATE HEPTAHYDRATE 4 G: 40 INJECTION, SOLUTION INTRAVENOUS at 14:52

## 2021-01-01 RX ADMIN — Medication: at 16:20

## 2021-01-01 RX ADMIN — DEXMEDETOMIDINE 1.5 MCG/KG/HR: 100 INJECTION, SOLUTION, CONCENTRATE INTRAVENOUS at 11:44

## 2021-01-01 RX ADMIN — HEPARIN SODIUM 13.8 UNITS/KG/HR: 10000 INJECTION, SOLUTION INTRAVENOUS at 20:20

## 2021-01-01 RX ADMIN — SODIUM CHLORIDE, POTASSIUM CHLORIDE, SODIUM LACTATE AND CALCIUM CHLORIDE 500 ML: 600; 310; 30; 20 INJECTION, SOLUTION INTRAVENOUS at 15:18

## 2021-01-01 RX ADMIN — NYSTATIN: 100000 POWDER TOPICAL at 08:57

## 2021-01-01 RX ADMIN — REMDESIVIR 100 MG: 100 INJECTION, POWDER, LYOPHILIZED, FOR SOLUTION INTRAVENOUS at 05:00

## 2021-01-01 RX ADMIN — PROPOFOL 50 MCG/KG/MIN: 10 INJECTION, EMULSION INTRAVENOUS at 21:30

## 2021-01-01 RX ADMIN — INSULIN ASPART 3 UNITS: 100 INJECTION, SOLUTION INTRAVENOUS; SUBCUTANEOUS at 23:47

## 2021-01-01 RX ADMIN — DOCUSATE SODIUM 50 MG AND SENNOSIDES 8.6 MG 1 TABLET: 8.6; 5 TABLET, FILM COATED ORAL at 20:13

## 2021-01-01 RX ADMIN — NOREPINEPHRINE BITARTRATE 0.18 MCG/KG/MIN: 1 INJECTION, SOLUTION, CONCENTRATE INTRAVENOUS at 11:53

## 2021-01-01 RX ADMIN — PANTOPRAZOLE SODIUM 40 MG: 40 INJECTION, POWDER, FOR SOLUTION INTRAVENOUS at 08:27

## 2021-01-01 RX ADMIN — Medication: at 18:09

## 2021-01-01 RX ADMIN — PROPOFOL 50 MCG/KG/MIN: 10 INJECTION, EMULSION INTRAVENOUS at 06:18

## 2021-01-01 RX ADMIN — INSULIN ASPART 3 UNITS: 100 INJECTION, SOLUTION INTRAVENOUS; SUBCUTANEOUS at 05:43

## 2021-01-01 RX ADMIN — PROPOFOL 50 MCG/KG/MIN: 10 INJECTION, EMULSION INTRAVENOUS at 07:32

## 2021-01-01 RX ADMIN — MEROPENEM 500 MG: 500 INJECTION, POWDER, FOR SOLUTION INTRAVENOUS at 14:51

## 2021-01-01 RX ADMIN — ACETAMINOPHEN 650 MG: 325 TABLET ORAL at 00:04

## 2021-01-01 RX ADMIN — REMDESIVIR 100 MG: 100 INJECTION, POWDER, LYOPHILIZED, FOR SOLUTION INTRAVENOUS at 05:37

## 2021-01-01 RX ADMIN — NYSTATIN: 100000 POWDER TOPICAL at 20:46

## 2021-01-01 RX ADMIN — CLINDAMYCIN IN 5 PERCENT DEXTROSE 600 MG: 12 INJECTION, SOLUTION INTRAVENOUS at 02:11

## 2021-01-01 RX ADMIN — Medication: at 12:32

## 2021-01-01 RX ADMIN — INSULIN ASPART 8 UNITS: 100 INJECTION, SOLUTION INTRAVENOUS; SUBCUTANEOUS at 20:54

## 2021-01-01 RX ADMIN — PANTOPRAZOLE SODIUM 40 MG: 40 INJECTION, POWDER, FOR SOLUTION INTRAVENOUS at 09:17

## 2021-01-01 RX ADMIN — HEPARIN SODIUM 15 UNITS/KG/HR: 10000 INJECTION, SOLUTION INTRAVENOUS at 12:35

## 2021-01-01 RX ADMIN — HEPARIN SODIUM 5000 UNITS: 5000 INJECTION INTRAVENOUS; SUBCUTANEOUS at 20:25

## 2021-01-01 RX ADMIN — MINERAL OIL AND PETROLATUM: 150; 830 OINTMENT OPHTHALMIC at 23:48

## 2021-01-01 RX ADMIN — PROPOFOL 50 MCG/KG/MIN: 10 INJECTION, EMULSION INTRAVENOUS at 03:50

## 2021-01-01 RX ADMIN — PROPOFOL 50 MCG/KG/MIN: 10 INJECTION, EMULSION INTRAVENOUS at 02:00

## 2021-01-01 RX ADMIN — MINERAL OIL AND PETROLATUM: 150; 830 OINTMENT OPHTHALMIC at 07:33

## 2021-01-01 RX ADMIN — MINERAL OIL AND PETROLATUM: 150; 830 OINTMENT OPHTHALMIC at 08:40

## 2021-01-01 RX ADMIN — DEXMEDETOMIDINE 1.5 MCG/KG/HR: 100 INJECTION, SOLUTION, CONCENTRATE INTRAVENOUS at 05:43

## 2021-01-01 RX ADMIN — CHLORHEXIDINE GLUCONATE 15 ML: 1.2 RINSE ORAL at 20:12

## 2021-01-01 RX ADMIN — POLYETHYLENE GLYCOL (3350) 17 G: 17 POWDER, FOR SOLUTION ORAL at 08:00

## 2021-01-01 RX ADMIN — Medication 1 MG/HR: at 04:49

## 2021-01-01 RX ADMIN — DEXTROSE AND SODIUM CHLORIDE 150 ML/HR: 5; 450 INJECTION, SOLUTION INTRAVENOUS at 14:29

## 2021-01-01 RX ADMIN — INSULIN DETEMIR 15 UNITS: 100 INJECTION, SOLUTION SUBCUTANEOUS at 09:08

## 2021-01-01 RX ADMIN — CALCIUM GLUCONATE: 98 INJECTION, SOLUTION INTRAVENOUS at 21:23

## 2021-01-01 RX ADMIN — DEXTROSE MONOHYDRATE 25 G: 500 INJECTION PARENTERAL at 06:08

## 2021-01-01 RX ADMIN — PANTOPRAZOLE SODIUM 40 MG: 40 INJECTION, POWDER, FOR SOLUTION INTRAVENOUS at 19:45

## 2021-01-01 RX ADMIN — MINERAL OIL AND PETROLATUM: 150; 830 OINTMENT OPHTHALMIC at 20:46

## 2021-01-01 RX ADMIN — SODIUM BICARBONATE TAB 650 MG 1300 MG: 650 TAB at 20:46

## 2021-01-01 RX ADMIN — INSULIN DETEMIR 25 UNITS: 100 INJECTION, SOLUTION SUBCUTANEOUS at 10:50

## 2021-01-01 RX ADMIN — INSULIN ASPART 5 UNITS: 100 INJECTION, SOLUTION INTRAVENOUS; SUBCUTANEOUS at 05:21

## 2021-01-01 RX ADMIN — PROPOFOL 50 MCG/KG/MIN: 10 INJECTION, EMULSION INTRAVENOUS at 22:54

## 2021-01-01 RX ADMIN — Medication: at 09:02

## 2021-01-01 RX ADMIN — PIPERACILLIN SODIUM AND TAZOBACTAM SODIUM 3.38 G: 3; .375 INJECTION, POWDER, LYOPHILIZED, FOR SOLUTION INTRAVENOUS at 20:30

## 2021-01-01 RX ADMIN — LORAZEPAM 1.5 MG: 2 INJECTION INTRAMUSCULAR; INTRAVENOUS at 09:39

## 2021-01-01 RX ADMIN — PROPOFOL 50 MCG/KG/MIN: 10 INJECTION, EMULSION INTRAVENOUS at 20:43

## 2021-01-01 RX ADMIN — Medication: at 21:11

## 2021-01-01 RX ADMIN — INSULIN HUMAN 11 UNITS/HR: 1 INJECTION, SOLUTION INTRAVENOUS at 19:27

## 2021-01-01 RX ADMIN — PANTOPRAZOLE SODIUM 40 MG: 40 INJECTION, POWDER, FOR SOLUTION INTRAVENOUS at 11:40

## 2021-01-01 RX ADMIN — CASTOR OIL AND BALSAM, PERU 1 APPLICATION: 788; 87 OINTMENT TOPICAL at 20:03

## 2021-01-01 RX ADMIN — CLINDAMYCIN IN 5 PERCENT DEXTROSE 600 MG: 12 INJECTION, SOLUTION INTRAVENOUS at 17:49

## 2021-01-01 RX ADMIN — PANTOPRAZOLE SODIUM 40 MG: 40 INJECTION, POWDER, FOR SOLUTION INTRAVENOUS at 21:32

## 2021-01-01 RX ADMIN — PROPOFOL 50 MCG/KG/MIN: 10 INJECTION, EMULSION INTRAVENOUS at 17:11

## 2021-01-01 RX ADMIN — MINERAL OIL AND PETROLATUM: 150; 830 OINTMENT OPHTHALMIC at 16:19

## 2021-01-01 RX ADMIN — SODIUM BICARBONATE TAB 650 MG 1300 MG: 650 TAB at 20:25

## 2021-01-01 RX ADMIN — PROPOFOL 45 MCG/KG/MIN: 10 INJECTION, EMULSION INTRAVENOUS at 16:11

## 2021-01-01 RX ADMIN — SODIUM CHLORIDE 1000 ML: 9 INJECTION, SOLUTION INTRAVENOUS at 15:45

## 2021-01-01 RX ADMIN — VECURONIUM BROMIDE 8 MG: 1 INJECTION, POWDER, LYOPHILIZED, FOR SOLUTION INTRAVENOUS at 02:59

## 2021-01-01 RX ADMIN — Medication 6 MG/HR: at 09:35

## 2021-01-01 RX ADMIN — MICAFUNGIN SODIUM 100 MG: 100 INJECTION, POWDER, LYOPHILIZED, FOR SOLUTION INTRAVENOUS at 12:01

## 2021-08-04 NOTE — ED NOTES
MEDICAL SCREENING     Patient initially seen in triage.  The patient was advised further evaluation and diagnostic testing will be needed, some of the treatment and testing will be initiated in the lobby in order to begin the process.  The patient will be returned to the waiting area for the time being and possibly be re-assessed by a subsequent ED provider.  The patient will be brought back to the treatment area in as timely manner as possible.       Gilmer Avitia MD  08/04/21 0907

## 2021-08-04 NOTE — CONSULTS
Stroke Consult Note    Patient Name: Martinez Mckenna   MRN: [id number]  Age: 51 y.o.  Sex: male  : 1970    Primary Care Physician: Rafal Casillas MD  Referring Physician:  No ref. provider found    TIME STROKE TEAM CALLED: 10:27 AM EST     TIME PATIENT SEEN: 10:30 AM EST    Handedness: Right  Race: White    Chief Complaint/Reason for Consultation: Tremors right upper extremity more than left    Subjective .  HPI: 51-year-old right-handed white male with known diagnosis of diabetes, smokeless tobacco use, occasional alcohol use, was working on a computer when he suddenly developed tremulousness in right more than left upper extremity, and some right facial numbness, and blurry vision, for which he came to the hospital.  Started at around 8:30 AM this morning.  Denies any similar findings in the past.  No history of stroke/MI.  Has diabetes since last 6 to 7 years, highest A1c was 12.  Glucose was 337 in the ER.    Last Known Normal Date/Time: 8:30 AM EST     Review of Systems   Constitutional: Negative.    HENT: Negative.    Eyes: Negative.    Respiratory: Negative.    Cardiovascular: Negative.    Gastrointestinal: Negative.    Endocrine: Negative.    Genitourinary: Negative.    Musculoskeletal: Negative.    Skin: Negative.    Allergic/Immunologic: Negative.    Psychiatric/Behavioral: Negative.       No past medical history on file.  No past surgical history on file.  No family history on file.  Social History     Socioeconomic History   • Marital status: Single     Spouse name: Not on file   • Number of children: Not on file   • Years of education: Not on file   • Highest education level: Not on file     No Known Allergies  Prior to Admission medications    Not on File             Objective     Temp:  [97.9 °F (36.6 °C)-98 °F (36.7 °C)] 98 °F (36.7 °C)  Heart Rate:  [] 87  Resp:  [20-21] 21  BP: (126-180)/(87-98) 126/95  Neurological Exam  Mental Status  Awake, alert and oriented to person, place and  time.Alert. Speech is normal. Language is fluent with no aphasia. Attention and concentration are normal. Fund of knowledge is appropriate for level of education.    Cranial Nerves  CN II: Visual fields full to confrontation.  CN III, IV, VI: Extraocular movements intact bilaterally. Normal lids and orbits bilaterally. Pupils equal round and reactive to light bilaterally.  CN V: Facial sensation is normal.  CN VII: Full and symmetric facial movement.  CN VIII: Equal hearing bilaterally.  CN IX, X: Palate elevates symmetrically  CN XI: Shoulder shrug strength is normal.  CN XII: Tongue midline without atrophy or fasciculations.    Motor  Normal muscle bulk throughout. No fasciculations present. The following abnormal movements were seen:  No obvious focal weakness  Tremors in right upper more than left upper extremity, improves intermittently while talking.  No hemiballismus..    Sensory  Light touch is normal in upper and lower extremities.     Reflexes  Not assessed.    Coordination  No dysmetria.    Gait  Not assessed.      Physical Exam  Vitals and nursing note reviewed.   Constitutional:       Appearance: Normal appearance.   HENT:      Head: Normocephalic and atraumatic.      Mouth/Throat:      Mouth: Mucous membranes are moist.      Pharynx: Oropharynx is clear.   Eyes:      General: Lids are normal.      Extraocular Movements: Extraocular movements intact.      Pupils: Pupils are equal, round, and reactive to light.   Cardiovascular:      Rate and Rhythm: Normal rate and regular rhythm.   Pulmonary:      Effort: Pulmonary effort is normal. No respiratory distress.   Musculoskeletal:      Cervical back: Normal range of motion and neck supple.   Neurological:      Mental Status: He is alert.   Psychiatric:         Mood and Affect: Mood normal.         Speech: Speech normal.         Behavior: Behavior normal.         Acute Stroke Data    Alteplase (tPA) Inclusion / Exclusion Criteria    Time: 10:45 EDT  Person  Administering Scale: Sandro Amado MD    Inclusion Criteria  [x]   18 years of age or greater   [x]   Onset of symptoms < 4.5 hours before beginning treatment (stroke onset = time patient was last seen well or without symptoms).   []   Diagnosis of acute ischemic stroke causing measurable disabling deficit (Complete Hemianopia, Any Aphasia, Visual or Sensory Extinction, Any weakness limiting sustained effort against gravity)   [x]   Any remaining deficit considered potentially disabling in view of patient and practitioner   Exclusion criteria (Do not proceed with Alteplase if any are checked under exclusion criteria)  []   Onset unknown or GREATER than 4.5 hours   []   ICH on CT/MRI   []   CT demonstrates hypodensity representing acute or subacute infarct   []   Significant head trauma or prior stroke in the previous 3 months   []   Symptoms suggestive of subarachnoid hemorrhage   []   History of un-ruptured intracranial aneurysm GREATER than 10 mm   []   Recent intracranial or intraspinal surgery within the last 3 months   []   Arterial puncture at a non-compressible site in the previous 7 days   []   Active internal bleeding   []   Acute bleeding tendency   []   Platelet count LESS than 100,000 for known hematological diseases such as leukemia, thrombocytopenia or chronic cirrhosis   []   Current use of anticoagulant with INR GREATER than 1.7 or PT GREATER than 15 seconds, aPTT GREATER than 40 seconds   []   Heparin received within 48 hours, resulting in abnormally elevated aPTT GREATER than upper limit of normal   []   Current use of direct thrombin inhibitors or direct factor Xa inhibitors in the past 48 hours   []   Elevated blood pressure refractory to treatment (systolic GREATER than 185 mm/Hg or diastolic  GREATER than 110 mm/Hg   []   Suspected infective endocarditis and aortic arch dissection   []   Current use of therapeutic treatment dose of low-molecular-weight heparin (LMWH) within the previous 24  hours   []   Structural GI malignancy or bleed   Relative exclusion for all patients  []   Only minor non-disabling symptoms   []   Pregnancy   []   Seizure at onset with postictal residual neurological impairments   []   Major surgery or previous trauma within past 14 days   []   History of previous spontaneous ICH, intracranial neoplasm, or AV malformation   []   Postpartum (within previous 14 days)   []   Recent GI or urinary tract hemorrhage (within previous 21 days)   []   Recent acute MI (within previous 3 months)   []   History of un-ruptured intracranial aneurysm LESS than 10 mm   []   History of ruptured intracranial aneurysm   []   Blood glucose LESS than 50 mg/dL (2.7 mmol/L)   []   Dural puncture within the last 7 days   []   Known GREATER than 10 cerebral microbleeds   Additional exclusions for patients with symptoms onset between 3 and 4.5 hours.  []   Age > 80.   []   On any anticoagulants regardless of INR  >>> Warfarin (Coumadin), Heparin, Enoxaparin (Lovenox), fondaparinux (Arixtra), bivalirudin (Angiomax), Argatroban, dabigatran (Pradaxa), rivaroxaban (Xarelto), or apixaban (Eliquis)   []   Severe stroke (NIHSS > 25).   []   History of BOTH diabetes and previous ischemic stroke.   []   The risks and benefits have been discussed with the patient or family related to the administration of IV Alteplase for stroke symptoms.   []   I have discussed and reviewed the patient's case and imaging with the attending prior to IV Alteplase.    Time Alteplase administered       Hospital Meds:  Scheduled- insulin regular, 5 Units, Subcutaneous, Once      Infusions-     PRNs- sodium chloride    Functional Status Prior to Current Stroke/[place] Score: 0    NIH Stroke Scale  Time: 10:45 EDT  Person Administering Scale: Sandro Amado MD    1a  Level of consciousness: 0=alert; keenly responsive   1b. LOC questions:  0=Performs both tasks correctly   1c. LOC commands: 0=Performs both tasks correctly   2.  Best Gaze:  0=normal   3.  Visual: 0=No visual loss   4. Facial Palsy: 0=Normal symmetric movement   5a.  Motor left arm: 0=No drift, limb holds 90 (or 45) degrees for full 10 seconds   5b.  Motor right arm: 0=No drift, limb holds 90 (or 45) degrees for full 10 seconds   6a. motor left le=No drift, limb holds 90 (or 45) degrees for full 10 seconds   6b  Motor right le=No drift, limb holds 90 (or 45) degrees for full 10 seconds   7. Limb Ataxia: 0=Absent   8.  Sensory: 0=Normal; no sensory loss   9. Best Language:  0=No aphasia, normal   10. Dysarthria: 0=Normal   11. Extinction and Inattention: 0=No abnormality    Total:   0       Results Reviewed:  I have personally reviewed current lab, radiology, and data  CT head shows no acute changes, no hemorrhage  CT angiogram of head and neck shows no significant stenosis/occlusion  CT perfusion shows no perfusion deficit  Glucose was 337, other labs were reviewed as well.                Assessment/Plan:      1. Tremors.  Acute onset, less likely to be vascular.  His CT head, CT angiogram and CT perfusion's are negative.  Because he is in TPA window, will get MRI brain stat.  If MRI brain shows any acute findings, will consider giving him IV TPA.  Patient's tremors did improve while distracted, and also he had tremors in his left upper extremity as well.  His findings could be secondary to hyperglycemia.  2. Diabetes mellitus type 2 uncontrolled with hyperglycemia.  Last A1c known to him was 12.  His glucose is 337.  Maintain normoglycemia.  3. Keep him bedrest for now.    Patient was seen in an emergency set up, and will follow through his further work-up.  Less likely to receive TPA, but will follow up on the MRI.  Case was discussed with patient, nursing and the ER physician.  Thank you for the consult.    Update 11:50 AM.  MRI brain was reviewed, which shows no acute changes, no hemorrhage.  His symptoms likely secondary to hyperglycemia, rather than a new vascular event.   No further stroke work-up at this time, please call us with questions.  Treat his hyperglycemia, and expect complete improvement.  If symptoms do not improve, patient will need outpatient neurology follow-up.      Sandro Amado MD  August 4, 2021  10:45 EDT    Verbal consent taken.  Patient agreeable to be seen via telemedicine.    This was an audio and video enabled telemedicine encounter.

## 2021-08-04 NOTE — ED NOTES
Pt reports headache and shaking that started about 0830.     Jaymie Dominguez, RN  08/04/21 3436

## 2021-08-04 NOTE — ED NOTES
Code stroke canceled per Dr Avitia pt placed straight in room due t heart rate 211     Yolie Molina, RN  08/04/21 0984

## 2021-08-04 NOTE — ED NOTES
[facility]       Paris Cazares, PCT  08/04/21 0923

## 2021-08-05 NOTE — ED PROVIDER NOTES
Subjective     History provided by:  Patient   used: No    Stroke  Presenting symptoms: incoordination    Presenting symptoms: no change in level of consciousness, no confusion, no headaches, no language symptoms, no loss of balance, no focal sensory loss, no visual change and no weakness    Presenting symptoms comment:  Patient came to the emergency department because he developed right upper extremity tremor.  Onset quality:  Sudden  Last known well:  Approximately 1 hour ago.  Timing:  Constant  Progression:  Unchanged  Similar to previous episodes: no    Associated symptoms: no chest pain, no difficulty swallowing, no dizziness, no facial pain, no fall, no fever, no hearing loss, no bladder incontinence, no nausea, no neck pain, no paresthesias, no seizures, no tinnitus, no vertigo and no vomiting        Review of Systems   Constitutional: Negative for activity change, appetite change, chills, diaphoresis, fatigue and fever.   HENT: Negative for congestion, ear pain, hearing loss, sore throat, tinnitus and trouble swallowing.    Eyes: Negative for redness.   Respiratory: Negative for cough, chest tightness, shortness of breath and wheezing.    Cardiovascular: Negative for chest pain, palpitations and leg swelling.   Gastrointestinal: Negative for abdominal pain, diarrhea, nausea and vomiting.   Genitourinary: Negative for bladder incontinence, dysuria and urgency.   Musculoskeletal: Negative for arthralgias, back pain, myalgias and neck pain.   Skin: Negative for pallor, rash and wound.   Neurological: Positive for disturbances in coordination. Negative for dizziness, vertigo, seizures, speech difficulty, weakness, headaches, paresthesias and loss of balance.   Psychiatric/Behavioral: Negative for agitation, behavioral problems, confusion and decreased concentration.   All other systems reviewed and are negative.      No past medical history on file.    No Known Allergies    No past surgical  history on file.    No family history on file.    Social History     Socioeconomic History   • Marital status: Single     Spouse name: Not on file   • Number of children: Not on file   • Years of education: Not on file   • Highest education level: Not on file           Objective   Physical Exam  Vitals and nursing note reviewed.   Constitutional:       General: He is not in acute distress.     Appearance: Normal appearance. He is well-developed. He is not toxic-appearing or diaphoretic.   HENT:      Head: Normocephalic and atraumatic.      Right Ear: External ear normal.      Left Ear: External ear normal.      Nose: Nose normal.      Mouth/Throat:      Pharynx: No oropharyngeal exudate.      Tonsils: No tonsillar exudate.   Eyes:      General: Lids are normal.      Conjunctiva/sclera: Conjunctivae normal.      Pupils: Pupils are equal, round, and reactive to light.   Neck:      Thyroid: No thyromegaly.   Cardiovascular:      Rate and Rhythm: Normal rate and regular rhythm.      Pulses: Normal pulses.      Heart sounds: Normal heart sounds, S1 normal and S2 normal.   Pulmonary:      Effort: Pulmonary effort is normal. No tachypnea or respiratory distress.      Breath sounds: Normal breath sounds. No decreased breath sounds, wheezing or rales.   Chest:      Chest wall: No tenderness.   Abdominal:      General: Bowel sounds are normal. There is no distension.      Palpations: Abdomen is soft.      Tenderness: There is no abdominal tenderness. There is no guarding or rebound.   Musculoskeletal:         General: No tenderness or deformity. Normal range of motion.      Cervical back: Full passive range of motion without pain, normal range of motion and neck supple.   Lymphadenopathy:      Cervical: No cervical adenopathy.   Skin:     General: Skin is warm and dry.      Coloration: Skin is not pale.      Findings: No erythema or rash.   Neurological:      Mental Status: He is alert and oriented to person, place, and time.       GCS: GCS eye subscore is 4. GCS verbal subscore is 5. GCS motor subscore is 6.      Cranial Nerves: No cranial nerve deficit.      Sensory: No sensory deficit.   Psychiatric:         Speech: Speech normal.         Behavior: Behavior normal.         Thought Content: Thought content normal.         Judgment: Judgment normal.         Procedures           ED Course  ED Course as of Aug 05 1651   Wed Aug 04, 2021   1014 IMPRESSION:  1.  Mild/moderate cerebral atrophy.  2.  No CT evidence of acute intracranial abnormality.   CT Head Without Contrast Stroke Protocol [ES]   1045 On-call neurologist Dr. Amado evaluated at bedside, and recommended stat MRI of the brain to evaluate for possible stroke.      [ES]   1201 IMPRESSION:  1.  No acute infarct.  2.  No acute intracranial findings.  3.  Mild to moderate generalized cerebral atrophy is noted.   MRI Brain Without Contrast [ES]   1226 IMPRESSION:    Unremarkable exam. No acute cardiopulmonary findings identified.   XR Chest 1 View [ES]   1227 IMPRESSION:    Unremarkable CTA of the brain demonstrating no segments of  hemodynamically significant stenosis or large vessel occlusion.   CT Angiogram Head w AI Analysis of LVO [ES]   1227 IMPRESSION:    Unremarkable CTA of the neck demonstrating no segments of occlusion or  hemodynamically significant stenosis.   CT Angiogram Neck [ES]   1227 IMPRESSION:    No acute brain perfusion abnormality.   CT CEREBRAL PERFUSION WITH & WITHOUT CONTRAST [ES]   u Aug 05, 2021   1650 Dr. Amado reviewed the case, and no further evaluation and/or treatment from a neurology standpoint at this time.  Patient had extensive evaluation done in the emergency department, no acute intracranial abnormalities noted.  Patient has developed an essential tremor at this time, and can follow-up with neurology in outpatient setting.  This was discussed in detail with the patient prior to discharge at this time.  He is to return to the emergency department  with any worsening symptoms.    [ES]      ED Course User Index  [ES] Gilmer Avitia MD                                           MDM  Number of Diagnoses or Management Options  Tremor: new and requires workup     Amount and/or Complexity of Data Reviewed  Clinical lab tests: reviewed and ordered  Tests in the radiology section of CPT®: reviewed and ordered  Tests in the medicine section of CPT®: ordered and reviewed  Review and summarize past medical records: yes  Discuss the patient with other providers: yes  Independent visualization of images, tracings, or specimens: yes    Risk of Complications, Morbidity, and/or Mortality  Presenting problems: moderate  Diagnostic procedures: moderate  Management options: moderate    Patient Progress  Patient progress: stable      Final diagnoses:   Tremor       ED Disposition  ED Disposition     ED Disposition Condition Comment    Discharge Stable           Rafal Casillas MD  [address] [place] [address]  [phone]    Schedule an appointment as soon as possible for a visit in 1 day  EVALUATE         Medication List      New Prescriptions    LORazepam 1 MG tablet  Commonly known as: ATIVAN  Take 1 tablet by mouth Every 8 (Eight) Hours As Needed for Anxiety.           Where to Get Your Medications      These medications were sent to [facility]  [address] [address]    Hours: 8AM-6PM Mon-Fri Phone: [phone]   · LORazepam 1 MG tablet          Gilmer Avitia MD  08/05/21 3991

## 2021-09-15 PROBLEM — E11.10 DIABETIC ACIDOSIS (HCC): Status: ACTIVE | Noted: 2021-01-01

## 2021-10-01 NOTE — CASE MANAGEMENT/SOCIAL WORK
Discharge Planning Assessment   [place]     Patient Name: Martinez Mckenna  MRN: [id number]  Today's Date: 10/1/2021    Admit Date: 9/14/2021    Discharge Plan     Row Name 10/01/21 1135       Plan    Plan Pt admitted 9/14/21. Pt to remain in covid isolation until 10/5/21. Pt remains intubated and sedated. Palliative is following for GOC. Pt is from home and does not utilize home health services or DME. Discharge plan pending clinical improvement. SS following.        HERNESTO Duggan

## 2021-10-01 NOTE — PROGRESS NOTES
PROGRESS NOTE         Patient Identification:  Name:  Martinez Mckenna  Age:  51 y.o.  Sex:  male  :  1970  MRN:  [id number]  Visit Number:  [id number]  Primary Care Provider:  Rafal Casillas MD         LOS: 16 days       ----------------------------------------------------------------------------------------------------------------------  Subjective       Chief Complaints:    Respiratory Distress        Interval History:      Case discussed with nursing.  Patient had an elevated troponin overnight with some nonspecific ST/T wave abnormalities.  Patient remains sedated and intubated at 80% FiO2.  Afebrile.  CRP stable at 10.11.  Leukocytosis worsened at 13.52.  Procalcitonin from 2021 improved at 1.61.  Fungal antibody panel negative.  QuantiFERON-TB gold is indeterminate.  Aspergillus galactomannan antigen positive at 1.88.  Chest x-ray from 10/1/2021 reports very little interval change since the previous exam.  Continues to require vasopressors.    Review of Systems:    Unable to obtain.  Intubated and sedated.  ----------------------------------------------------------------------------------------------------------------------      Objective       Current Hospital Meds:  artificial tears, , Both Eyes, Q4H  chlorhexidine, 15 mL, Mouth/Throat, Q12H  dexamethasone, 6 mg, Intravenous, Q24H  insulin aspart, 0-14 Units, Subcutaneous, Q6H  insulin detemir, 7 Units, Subcutaneous, Q12H  meropenem, 500 mg, Intravenous, Q24H  nystatin, , Topical, Q12H  pantoprazole, 40 mg, Intravenous, Q12H  polyethylene glycol, 17 g, Oral, Daily  senna-docusate sodium, 1 tablet, Oral, BID  sodium bicarbonate, 100 mEq, Intravenous, Once  sodium bicarbonate, 1,300 mg, Oral, TID  sodium chloride, 10 mL, Intravenous, Q12H  sodium chloride, 10 mL, Intravenous, Q12H  sodium chloride, 10 mL, Intravenous, Q12H  sodium chloride, 10 mL, Intravenous, Q12H  Vancomycin Pharmacy Intermittent Dosing, , Does not apply,  Daily  voriconazole, 200 mg, Per G Tube, Q12H      dexmedetomidine, 0.2-1.5 mcg/kg/hr, Last Rate: 1.5 mcg/kg/hr (10/01/21 0605)  fentaNYL Citrate,   heparin (porcine), 9.8 Units/kg/hr, Last Rate: 13.8 Units/kg/hr (10/01/21 0806)  Midazolam 1 mg/mL 100mL NS, 1-10 mg/hr, Last Rate: 10 mg/hr (10/01/21 0346)  norepinephrine, 0.02-0.3 mcg/kg/min, Last Rate: 0.02 mcg/kg/min (09/30/21 0947)  propofol, 5-50 mcg/kg/min, Last Rate: 50 mcg/kg/min (10/01/21 1130)  vecuronium (NORCURON) infusion, 0.6-1.2 mcg/kg/min, Last Rate: 0.6 mcg/kg/min (10/01/21 0605)      ----------------------------------------------------------------------------------------------------------------------    Vital Signs:  Temp:  [97.6 °F (36.4 °C)-99.8 °F (37.7 °C)] 99.8 °F (37.7 °C)  Heart Rate:  [61-91] 71  Resp:  [29] 29  BP: ()/(57-85) 127/74  FiO2 (%):  [80 %] 80 %  Mean Arterial Pressure (Non-Invasive) for the past 24 hrs (Last 3 readings):   Noninvasive MAP (mmHg)   10/01/21 0805 92   10/01/21 0633 77   10/01/21 0618 73     SpO2 Percentage    10/01/21 0633 10/01/21 0805 10/01/21 1023   SpO2: 94% 90% 92%     SpO2:  [90 %-99 %] 92 %  on   ;   Device (Oxygen Therapy): ventilator    Body mass index is 34.57 kg/m².  Wt Readings from Last 3 Encounters:   10/01/21 106 kg (234 lb 3.2 oz)   08/04/21 95.3 kg (210 lb)        Intake/Output Summary (Last 24 hours) at 10/1/2021 1233  Last data filed at 10/1/2021 0614  Gross per 24 hour   Intake 3627.58 ml   Output 475 ml   Net 3152.58 ml     NPO Diet  ----------------------------------------------------------------------------------------------------------------------      Physical Exam:    Deferred due to COVID-19 isolation.  ----------------------------------------------------------------------------------------------------------------------  Results from last 7 days   Lab Units 10/01/21  0825   TROPONIN T ng/mL 0.045*             Results from last 7 days   Lab Units 10/01/21  1203   PH, ARTERIAL pH  units 7.361   PO2 ART mm Hg 69.8*   PCO2, ARTERIAL mm Hg 40.2   HCO3 ART mmol/L 22.7     Results from last 7 days   Lab Units 10/01/21  0432 09/30/21  0539 09/29/21  0408 09/28/21  1428 09/28/21  0213   CRP mg/dL 10.11* 10.58* 9.31*  --    < >   WBC 10*3/mm3 13.52* 9.13 12.00*  --    < >   HEMOGLOBIN g/dL 8.4* 8.0* 8.4*  --    < >   HEMATOCRIT % 25.1* 23.7* 26.4*  --    < >   MCV fL 93.0 91.9 96.4  --    < >   MCHC g/dL 33.5 33.8 31.8  --    < >   PLATELETS 10*3/mm3 139* 120* 143  --    < >   INR   --   --   --  0.91  --     < > = values in this interval not displayed.     Results from last 7 days   Lab Units 10/01/21  0432 09/30/21  0539 09/29/21  0408 09/26/21  0340 09/25/21  0206   SODIUM mmol/L 127* 128* 132*   < > 135*   POTASSIUM mmol/L 4.0 3.9 4.7   < > 3.4*   MAGNESIUM mg/dL  --   --   --   --  2.3   CHLORIDE mmol/L 88* 88* 93*   < > 94*   CO2 mmol/L 20.4* 21.3* 21.4*   < > 23.3   BUN mg/dL 61* 51* 63*   < > 49*   CREATININE mg/dL 3.70* 3.31* 4.59*   < > 3.42*   EGFR IF NONAFRICN AM mL/min/1.73 18* 20* 14*   < > 19*   CALCIUM mg/dL 7.8* 7.2* 7.2*   < > 6.7*   GLUCOSE mg/dL 153* 146* 44*   < > 81   ALBUMIN g/dL 2.28* 2.30* 2.28*   < > 2.05*   BILIRUBIN mg/dL 0.3 0.4 0.4   < > 0.4   ALK PHOS U/L 92 93 97   < > 153*   AST (SGOT) U/L 9 10 14   < > 17   ALT (SGPT) U/L 6 7 7   < > 11    < > = values in this interval not displayed.   Estimated Creatinine Clearance: 29 mL/min (A) (by C-G formula based on SCr of 3.7 mg/dL (H)).  No results found for: AMMONIA    Glucose   Date/Time Value Ref Range Status   10/01/2021 1137 188 (H) 70 - 130 mg/dL Final     Comment:     Meter: [id number] : [id number] Michelle Lacey   10/01/2021 0527 170 (H) 70 - 130 mg/dL Final     Comment:     Meter: [id number] : [id number] AUSTIN WHITE   09/30/2021 2341 177 (H) 70 - 130 mg/dL Final     Comment:     Meter: [id number] : [id number] KRISTEN BROWNING   09/30/2021 2146 129 70 - 130 mg/dL Final     Comment:     Meter: [id number]  : [id number] AUSTIN WHITE   09/30/2021 1814 169 (H) 70 - 130 mg/dL Final     Comment:     Meter: [id number] : [id number] FREEDOM MOSELEY   09/30/2021 1258 190 (H) 70 - 130 mg/dL Final     Comment:     Meter: [id number] : [id number] FREEDOM MOSELEY   09/30/2021 0635 154 (H) 70 - 130 mg/dL Final     Comment:     Meter: [id number] : [id number] AUSTIN WHITE   09/29/2021 2316 158 (H) 70 - 130 mg/dL Final     Comment:     Meter: [id number] : [id number] AUSTIN WHITE     Lab Results   Component Value Date    HGBA1C 11.20 (H) 09/14/2021     Lab Results   Component Value Date    TSH 0.761 09/14/2021    FREET4 1.08 09/14/2021       Blood Culture   Date Value Ref Range Status   09/14/2021 No growth at 2 days  Preliminary   09/14/2021 No growth at 2 days  Preliminary     No results found for: URINECX  No results found for: WOUNDCX  No results found for: STOOLCX  No results found for: RESPCX  Pain Management Panel    There is no flowsheet data to display.           ----------------------------------------------------------------------------------------------------------------------  Imaging Results (Last 24 Hours)       Procedure Component Value Units Date/Time    XR Chest 1 View [[id number]] Collected: 10/01/21 0800     Updated: 10/01/21 0803    Narrative:      XR CHEST 1 VW-     CLINICAL INDICATION: vent mgmt; E13.11-Other specified diabetes mellitus  with ketoacidosis with coma; J96.01-Acute respiratory failure with  hypoxia; U07.1-COVID-19; N17.9-Acute kidney failure, unspecified;  K85.90-Acute pancreatitis without necrosis or infection, unspecified;  K29.90-Gastroduodenitis, unspecified, without bleeding;  J15.9-Unspecified bacterial pneumonia; E83.39-Other disorders of  phosphorus metabolism;         COMPARISON: 09/30/2021      TECHNIQUE: Single frontal view of the chest.     FINDINGS:     Trace left effusion and bilateral consolidation  No interval line change  There is no evidence of an acute osseous abnormality.    There are no suspicious-appearing parenchymal soft tissue nodules.          Impression:      Very little interval change since the previous exam     This report was finalized on 10/1/2021 8:00 AM by Dr. Jc Tovar MD.               ----------------------------------------------------------------------------------------------------------------------    Assessment/Plan       Assessment/Plan     ASSESSMENT:    1.  Septic shock with lactic acid greater than 4 on admission  2.  Covid pneumonia  3.  Aspiration pneumonia    PLAN:    Case discussed with nursing.  Patient had an elevated troponin overnight with some nonspecific ST/T wave abnormalities.  Patient remains sedated and intubated at 80% FiO2.  Afebrile.  CRP stable at 10.11.  Leukocytosis worsened at 13.52.  Procalcitonin from 9/29/2021 improved at 1.61.  Fungal antibody panel negative.  QuantiFERON-TB gold is indeterminate.  Aspergillus galactomannan antigen positive at 1.88.  Chest x-ray from 10/1/2021 reports very little interval change since the previous exam.  Continues to require vasopressors.    Respiratory culture from 9/20/2021 has finalized with light growth of yeast, not Candida albicans.  Chest x-ray from 9/30/2021 reports bilateral consolidation.      CT chest with PE protocol on 9/26/2021 shows markedly limited examination with no gross pulmonary emboli.  Pneumomediastinum.  Correlate with ventilation settings.  Significant bilateral groundglass infiltrates with lower lobe consolidation and new cavitary process with filling defect in the right upper lobe.  Correlate for superimposed opportunistic infection.  Marked abnormalities in the upper abdomen suspicious for pancreatitis.  Incidental fluid-filled colon loops that may be correlated clinically with diarrhea.  Moderate steatosis of the liver, correlate with LFTs.    Legionella screen negative.  Strep pneumo screen negative. Respiratory culture from 9/15/2021 finalized as light growth of  Candida albicans. Respiratory panel from 9/15/2021 detected RSV.  Blood cultures from 9/14/2021 finalized as no growth.  MRSA screen was negative on 9/15/2021.    Recommend to continue current courses of Merrem, voriconazole, vancomycin.    In the setting of cavitary lesion and indeterminant QuantiFERON TB Gold there is strong indication for bronchoscopy with BAL for AFB.    Pulmonology note reviewed, in regard to cavitary lesion, recommendation to follow-up with outpatient imaging in 6 to 8 weeks and suggested antibiotic course x3 weeks.     We will continue to follow closely and adjust coverage as needed.    Code Status:   Code Status and Medical Interventions:   Ordered at: 09/28/21 1631     Limited Support to NOT Include:    Cardioversion/Defibrillation     Level Of Support Discussed With:    Next of Kin (If No Surrogate)     Code Status:    No CPR     Medical Interventions (Level of Support Prior to Arrest):    Limited     Scribed for Chloe Garvey MD by ALYSE Thompson. 10/1/2021  12:33 EDT       ALYSE Thompson  10/01/21  12:33 EDT    Physician Attestation:    The documentation recorded by the scribe accurately reflects the service I personally performed and the decisions made by me.    Chloe Garvey MD  10/01/21  12:33 EDT

## 2021-10-01 NOTE — CONSULTS
Cardiovascular Medicine            Assessment and plan  51-year-old with multiorgan failure secondary to COVID-19 pneumonia with acute hypoxic hypercapnic respiratory failure on mechanical ventilation for greater than 2 weeks ARDS bacterial pneumonia superimposed on above complicated by severe metabolic acidosis acute diabetic ketoacidosis acute renal failure acute pancreatitis candidemia anemia as noted from above this severe illness is caused a significant amount of strain on the myocardium and resulted in a supply demand mismatch with EKG changes due to his severe illness multiorgan failure medical management will be appropriate at this time to include heparin aspirin Plavix statin when able regarding beta-blockers and ACE inhibitor that are currently contraindicated due to hypotension requiring pressor support  If arrhythmia develops amiodarone will be my drug of choice    Acute hypoxic respiratory failure on mechanical ventilation PEG trach bound  Severe ARDS  COVID-19 pneumonia poor prognosis  Superimposed bacterial pneumonia  Severe metabolic acidosis  Severe diabetic ketoacidosis  Acute renal failure under care of nephrology  Poorly controlled diabetes with hemoglobin A1c greater than 10  Candida albicans  Anemia  Elevated D-dimer in the setting of above illness  Acute onset cavitary lung lesion making an infectious process more likely in the setting of immunosuppression  Abnormal EKG see above  Altered mental status admitted in an unresponsive state  It appears that palliative care may be an option close monitoring recommended      Guarded prognosis in the severely compromised patient  Thank you for allowing me to participate in this patient care  Austin Madrid DO                  No referring provider defined for this encounter.     Thank you for asking me to see Martinez Mckenna for acute coronary syndrome.    History of Present Illness  This is a 51 y.o. male with COVID-19 pneumonia complicated by severe  septic shock lactic acidosis and aspiration  We are consulted due to acute ST changes noted on EKG monitor which resulted in ordering troponins which were found to be elevated  Patient on Levophed high dose today and heparin drip  Recent complications include severe renal failure cavitary lung lesions  Recent echo reviewed  Review of Systems - ROS  Noncontributory due to above severe illness         All other systems were reviewed and were negative.    family history is not on file.     reports that he has never smoked. His smokeless tobacco use includes snuff.    No Known Allergies      Current Facility-Administered Medications:   •  acetaminophen (TYLENOL) tablet 650 mg, 650 mg, Oral, Q6H PRN, 650 mg at 09/16/21 1031 **OR** acetaminophen (TYLENOL) suppository 650 mg, 650 mg, Rectal, Q4H PRN, Adama Brown MD  •  albuterol sulfate HFA (PROVENTIL HFA;VENTOLIN HFA;PROAIR HFA) inhaler 2 puff, 2 puff, Inhalation, Q6H PRN, Kieran Rene MD  •  artificial tears ophthalmic ointment, , Both Eyes, Q4H, Sully Zhong MD, Given at 10/01/21 1131  •  calcium gluconate 1 g in sodium chloride 0.9 % 100 mL IVPB, 1 g, Intravenous, PRN **AND** calcium gluconate 6 g in sodium chloride 0.9 % 500 mL IVPB, 6 g, Intravenous, PRN **AND** Calcium, , , PRN, Kieran Rene MD  •  chlorhexidine (PERIDEX) 0.12 % solution 15 mL, 15 mL, Mouth/Throat, Q12H, Kieran Rene MD, 15 mL at 10/01/21 0900  •  dexamethasone (DECADRON) injection 6 mg, 6 mg, Intravenous, Q24H, Hardy Wing MD, 6 mg at 10/01/21 0609  •  DEXMEDETOMIDINE 1000 MCG/250ML INFUSION infusion, 0.2-1.5 mcg/kg/hr, Intravenous, Titrated, Adama Brown MD, Last Rate: 35.7 mL/hr at 10/01/21 0605, 1.5 mcg/kg/hr at 10/01/21 0605  •  dextrose (D50W) 25 g/ 50mL Intravenous Solution 25 g, 25 g, Intravenous, Q15 Min PRN, Kieran Rene MD, 25 g at 09/29/21 0541  •  dextrose (D50W) 25 g/ 50mL Intravenous Solution 25 g, 25 g, Intravenous, Q15 Min PRN,  Sully Zhong MD, 25 g at 09/29/21 0542  •  dextrose (GLUTOSE) oral gel 15 g, 15 g, Oral, Q15 Min PRN, Kieran Rene MD  •  dextrose (GLUTOSE) oral gel 15 g, 15 g, Oral, Q15 Min PRN, Sully Zhong MD  •  fentaNYL (SUBLIMAZE) PCA 1500 mcg/30 mL syringe, , Intravenous, Titrated, Kieran Rene MD, New Bag at 10/01/21 0858  •  glucagon (human recombinant) (GLUCAGEN DIAGNOSTIC) injection 1 mg, 1 mg, Subcutaneous, Q15 Min PRN, Kieran Rene MD  •  glucagon (human recombinant) (GLUCAGEN DIAGNOSTIC) injection 1 mg, 1 mg, Subcutaneous, Q15 Min PRN, Sully Zhong MD  •  heparin (porcine) 5000 UNIT/ML injection 2,500 Units, 2,500 Units, Intravenous, PRN, Kieran Rene MD  •  heparin (porcine) 5000 UNIT/ML injection 5,000 Units, 5,000 Units, Intravenous, PRN, Kieran Rene MD, 5,000 Units at 09/28/21 2147  •  heparin 64631 units/250 mL (100 units/mL) in 0.45 % NaCl infusion, 9.8 Units/kg/hr, Intravenous, Titrated, Kieran Rene MD, Last Rate: 14.07 mL/hr at 10/01/21 0806, 13.8 Units/kg/hr at 10/01/21 0806  •  insulin aspart (novoLOG) injection 0-14 Units, 0-14 Units, Subcutaneous, Q6H, Kieran Rene MD, 2 Units at 10/01/21 1139  •  insulin detemir (LEVEMIR) injection 7 Units, 7 Units, Subcutaneous, Q12H, Selvin Bragg APRN, 7 Units at 10/01/21 0859  •  Magnesium Sulfate 2 gram Bolus, followed by 8 gram infusion (total Mg dose 10 grams)- Mg less than or equal to 1mg/dL, 2 g, Intravenous, PRN **OR** Magnesium Sulfate 2 gram / 50mL Infusion (GIVE X 3 BAGS TO EQUAL 6GM TOTAL DOSE) - Mg 1.1 - 1.5 mg/dl, 2 g, Intravenous, PRN **OR** Magnesium Sulfate 4 gram infusion- Mg 1.6-1.9 mg/dL, 4 g, Intravenous, PRN, Kieran Rene MD  •  meropenem (MERREM) 500 mg in sodium chloride 0.9 % 100 mL IVPB-VTB, 500 mg, Intravenous, Q24H, Kieran Rene MD  •  midazolam (VERSED) 100 mg in 100mL NS infusion, 1-10 mg/hr, Intravenous, Titrated, Saira Hardy MD, Last Rate: 10 mL/hr at 10/01/21 0346, 10 mg/hr  at 10/01/21 0346  •  norepinephrine (LEVOPHED) 8 mg in 250 mL NS infusion (premix), 0.02-0.3 mcg/kg/min, Intravenous, Titrated, Sb Sullivan MD, Last Rate: 3.66 mL/hr at 09/30/21 0947, 0.02 mcg/kg/min at 09/30/21 0947  •  nystatin (MYCOSTATIN) powder, , Topical, Q12H, Gaye Velarde APRN, Given at 10/01/21 0857  •  pantoprazole (PROTONIX) injection 40 mg, 40 mg, Intravenous, Q12H, Adama Brown MD, 40 mg at 10/01/21 0900  •  Pharmacy to dose vancomycin, , Does not apply, Continuous PRN, Kieran Rene MD  •  phenylephrine (GIULIA-SYNEPHRINE) 1 MG/10ML injection 200 mcg, 2 mL, Intravenous, Q5 Min PRN, Adama Brown MD  •  polyethylene glycol (MIRALAX) packet 17 g, 17 g, Oral, Daily, Kieran Rene MD, 17 g at 09/30/21 0902  •  propofol (DIPRIVAN) infusion 10 mg/mL 100 mL, 5-50 mcg/kg/min, Intravenous, Titrated, Sukhjinder Edwards MD, Last Rate: 26.2 mL/hr at 10/01/21 1130, 50 mcg/kg/min at 10/01/21 1130  •  rocuronium (ZEMURON) injection 114.4 mg, 1.2 mg/kg, Intravenous, Once PRN, Adama Brown MD  •  sennosides-docusate (PERICOLACE) 8.6-50 MG per tablet 1 tablet, 1 tablet, Oral, BID, Kieran Rene MD, 1 tablet at 09/30/21 2142  •  sodium bicarbonate injection 8.4% 100 mEq, 100 mEq, Intravenous, Once, Lukas Red MD  •  sodium bicarbonate tablet 1,300 mg, 1,300 mg, Oral, TID, Lukas Red MD, 1,300 mg at 10/01/21 0900  •  [COMPLETED] Insert peripheral IV, , , Once **AND** sodium chloride 0.9 % flush 10 mL, 10 mL, Intravenous, PRN, Adama Brown MD  •  sodium chloride 0.9 % flush 10 mL, 10 mL, Intravenous, Q12H, Adama Brown MD, 10 mL at 10/01/21 0900  •  sodium chloride 0.9 % flush 10 mL, 10 mL, Intravenous, PRN, Adama Brown MD  •  sodium chloride 0.9 % flush 10 mL, 10 mL, Intravenous, Q12H, Rosa Skaggs DO, 10 mL at 09/30/21 2143  •  sodium chloride 0.9 % flush 10 mL, 10 mL, Intravenous, Q12H, Rosa Skaggs DO, 10 mL at  09/30/21 2142  •  sodium chloride 0.9 % flush 10 mL, 10 mL, Intravenous, Q12H, Rosa Skaggs, DO, 10 mL at 09/30/21 2140  •  sodium chloride 0.9 % flush 10 mL, 10 mL, Intravenous, PRN, Rosa Skaggs, DO, 10 mL at 09/26/21 0754  •  sodium chloride 0.9 % flush 20 mL, 20 mL, Intravenous, PRN, Rosa Skaggs, DO  •  sodium phosphates 21 mmol in sodium chloride 0.9 % 250 mL IVPB, 21 mmol, Intravenous, PRN **OR** sodium phosphates 15 mmol in sodium chloride 0.9 % 250 mL IVPB, 15 mmol, Intravenous, PRN **OR** sodium phosphates 9 mmol in sodium chloride 0.9 % 250 mL IVPB, 9 mmol, Intravenous, PRN, Adama Brown MD  •  vecuronium (NORCURON) 100 mg in sodium chloride 0.9 % 100 mL (1 mg/mL) infusion, 0.6-1.2 mcg/kg/min, Intravenous, Titrated, Sully Zhong MD, Last Rate: 3.74 mL/hr at 10/01/21 0605, 0.6 mcg/kg/min at 10/01/21 0605  •  vecuronium (NORCURON) bolus from bag 1 mg/mL 5.2 mg, 50 mcg/kg, Intravenous, Once PRN, Sully Zhong MD  •  vecuronium (NORCURON) injection 8 mg, 8 mg, Intravenous, PRN, Leonardo Alcocer MD, 8 mg at 09/28/21 0259  •  voriconazole (VFEND) tablet 200 mg, 200 mg, Per G Tube, Q12H, Chloe Garvey MD    Physical Exam:  Vitals:    10/01/21 0618 10/01/21 0633 10/01/21 0805 10/01/21 1023   BP: 101/62 109/63 127/74    Pulse: 82 82 82 71   Resp:  (!) 29 (!) 29 (!) 29   Temp:   99.8 °F (37.7 °C)    TempSrc:   Oral    SpO2: 94% 94% 90% 92%   Weight:       Height:       Due to COVID-19 pandemic physical exam has been deferred vitals reviewed patient vented sedated    DATA REVIEWED:     EKG. I personally reviewed and interpreted the EKG.  paced    ECG/EMG Results (all)       Procedure Component Value Units Date/Time    ECG 12 Lead [[id number]] Collected: 08/04/21 1805     Updated: 08/04/21 2151     QT Interval 494 ms      QTC Interval 521 ms     Narrative:      Test Reason : CP  Blood Pressure :   */*   mmHG  Vent. Rate :  67 BPM     Atrial Rate :  67  BPM     P-R Int : 160 ms          QRS Dur : 166 ms      QT Int : 494 ms       P-R-T Axes :  57 -82  80 degrees     QTc Int : 521 ms    Trial sensed ventricular pacedv rhythm  Abnormal ECG  No previous ECGs available  Confirmed by Cory Oates ([id number]) on 8/4/2021 9:50:59 PM    Referred By: MERCEDES           Confirmed By: Cory Oates    ECG 12 Lead [[id number]] Collected: 08/04/21 2219     Updated: 08/04/21 2219    ECG 12 Lead [[id number]] Collected: 08/05/21 0253     Updated: 08/05/21 0253          ---------------------------------------------------  -----------------------------------------------------  CXR/Imaging:   Imaging Results (Most Recent)     Procedure Component Value Units Date/Time    XR Chest 1 View [[id number]] Collected: 10/01/21 0800     Updated: 10/01/21 0803    Narrative:      XR CHEST 1 VW-     CLINICAL INDICATION: vent mgmt; E13.11-Other specified diabetes mellitus  with ketoacidosis with coma; J96.01-Acute respiratory failure with  hypoxia; U07.1-COVID-19; N17.9-Acute kidney failure, unspecified;  K85.90-Acute pancreatitis without necrosis or infection, unspecified;  K29.90-Gastroduodenitis, unspecified, without bleeding;  J15.9-Unspecified bacterial pneumonia; E83.39-Other disorders of  phosphorus metabolism;         COMPARISON: 09/30/2021      TECHNIQUE: Single frontal view of the chest.     FINDINGS:     Trace left effusion and bilateral consolidation  No interval line change  There is no evidence of an acute osseous abnormality.   There are no suspicious-appearing parenchymal soft tissue nodules.          Impression:      Very little interval change since the previous exam     This report was finalized on 10/1/2021 8:00 AM by Dr. Jc Tovar MD.       XR Chest 1 View [[id number]] Collected: 09/30/21 0838     Updated: 09/30/21 0841    Narrative:      XR CHEST 1 VW-     CLINICAL INDICATION: vent mgmt; E13.11-Other specified diabetes mellitus  with ketoacidosis with coma; J96.01-Acute  respiratory failure with  hypoxia; U07.1-COVID-19; N17.9-Acute kidney failure, unspecified;  K85.90-Acute pancreatitis without necrosis or infection, unspecified;  K29.90-Gastroduodenitis, unspecified, without bleeding;  J15.9-Unspecified bacterial pneumonia; E83.39-Other disorders of  phosphorus metabolism;         COMPARISON: 09/29/2021      TECHNIQUE: Single frontal view of the chest.     FINDINGS:     Bilateral consolidation  No interval line change  There is no evidence of an acute osseous abnormality.   There are no suspicious-appearing parenchymal soft tissue nodules.          Impression:      Bilateral consolidation     This report was finalized on 9/30/2021 8:39 AM by Dr. Jc Tovar MD.       XR Chest 1 View [[id number]] Collected: 09/29/21 1909     Updated: 09/29/21 1916    Narrative:      XR CHEST 1 VW-     CLINICAL INDICATION: vent mgmt; E13.11-Other specified diabetes mellitus  with ketoacidosis with coma; J96.01-Acute respiratory failure with  hypoxia; U07.1-COVID-19; N17.9-Acute kidney failure, unspecified;  K85.90-Acute pancreatitis without necrosis or infection, unspecified;  K29.90-Gastroduodenitis, unspecified, without bleeding;  J15.9-Unspecified bacterial pneumonia; E83.39-Other disorders of  phosphorus metabolism;         COMPARISON: 09/28/2021      TECHNIQUE: Single frontal view of the chest.     FINDINGS:     Patchy bilateral airspace disease, slightly worse than on the previous  exam  No interval line change is seen  There is no evidence of an acute osseous abnormality.   There are no suspicious-appearing parenchymal soft tissue nodules.          Impression:      Lungs appear slightly worse than on the prior study     This report was finalized on 9/29/2021 7:09 PM by Dr. Jc Tovar MD.       US Venous Doppler Lower Extremity Bilateral (duplex) [[id number]] Collected: 09/29/21 1334     Updated: 09/29/21 1336    Narrative:      US VENOUS DOPPLER LOWER EXTREMITY BILATERAL (DUPLEX)-      CLINICAL INDICATION: d-dimer > 20 again, covid +; E13.11-Other specified  diabetes mellitus with ketoacidosis with coma; J96.01-Acute respiratory  failure with hypoxia; U07.1-COVID-19; N17.9-Acute kidney failure,  unspecified; K85.90-Acute pancreatitis without necrosis or infection,  unspecified; K29.90-Gastroduodenitis, unspecified, without bleeding;  J15.9-Unspecified bacterial pneumonia; E83.39-Other disorders of phosp        COMPARISON: 09/25/2021      TECHNIQUE: Color Doppler imaging was used with compression and  augmentation to evaluate the lower extremity deep venous system.     FINDINGS:   There is patent spontaneous flow from the common femoral vein through  the posterior tibial veins.  There was no internal clot or area of noncompressibility.  Normal augmentation was elicited where applicable.       Impression:      No DVT in the lower extremities on today's exam.      This report was finalized on 9/29/2021 1:34 PM by Dr. Jc Tovar MD.       XR Chest 1 View [[id number]] Collected: 09/28/21 0055     Updated: 09/28/21 0057    Narrative:      CR Chest 1 Vw    INDICATION:   Hypoxia, evaluate endotracheal tube     COMPARISON:    9/27/2021    FINDINGS:  Portable AP view(s) of the chest.    Endotracheal tube is in good position. Both right-sided central venous catheters have their tips in superior vena cava. Nasogastric tube tip is in stomach. Bilateral right greater than left infiltrates are present and stable.          Impression:      No change in 2 right venous catheters or bilateral infiltrates. Endotracheal tube is in good position    Signer Name: Karlos Rebolledo MD   Signed: 9/28/2021 12:55 AM   Work[facility]: [email or web address]    [facility]    XR Chest 1 View [[id number]] Collected: 09/27/21 1300     Updated: 09/27/21 1302    Narrative:      XR CHEST 1 VW-     CLINICAL INDICATION: respiratory failure; E13.11-Other specified  diabetes mellitus with ketoacidosis with coma; J96.01-Acute  respiratory  failure with hypoxia; U07.1-COVID-19; N17.9-Acute kidney failure,  unspecified; K85.90-Acute pancreatitis without necrosis or infection,  unspecified; K29.90-Gastroduodenitis, unspecified, without bleeding;  J15.9-Unspecified bacterial pneumonia; E83.39-Other disorders of  phosphorus me        COMPARISON: 09/25/2021      TECHNIQUE: Single frontal view of the chest.     FINDINGS:     Bilateral airspace disease  No interval line change  There is no evidence of an acute osseous abnormality.   There are no suspicious-appearing parenchymal soft tissue nodules.          Impression:      Little overall change     This report was finalized on 9/27/2021 1:00 PM by Dr. Jc Tovar MD.       CT Chest Pulmonary Embolism [[id number]] Collected: 09/26/21 1135     Updated: 09/26/21 1137    Narrative:      PROCEDURE: CT CHEST PULMONARY EMBOLISM W CONTRAST    COMPARISON: September 14 chest CT without contrast    INDICATIONS: PE suspected, low/intermediate prob, positive D-dimer;Covid /ventilator/elevated d dimer;  Other specified diabetes mellitus with ketoacidosis with coma; Acute respiratory failure with hypoxia;COVID-19;Acute kidney failure, unspecified;Acute pancreatitis without necrosis or infection, unspecified; Gastroduodenitis, unspecified, without  bleeding; Unspecified bacterial pneumonia; Other disorders of phosphorus metabolism; Shock, unspecified        TECHNIQUE:     CTA of the chest is performed with IV contrast. Multiplanar MIP and 3-D reconstructions  images are performed on separate workstation under concurrent radiologist supervision per protocol and reviewed.  Radiation dose reduction techniques included automated exposure control or exposure modulation based on body size. Count of known CT and cardiac nuc med studies performed in previous 12 months: 0.          FINDINGS: Study optimized for evaluation of the pulmonary arteries for pulmonary embolus. ET tube in place. Pneumomediastinum seen. Motion  artifact hinders image quality. No gross pneumothorax seen. Significant bilateral infiltrates noted with  consolidation in the lower lobes. However there is a cavitary process in the right upper lobe estimated to centimeters diameter with focal centimeter size nodule. The remainder the infiltrates are predominantly groundglass. Limited evaluation for  pulmonary emboli but no gross filling defects seen.      In the upper abdomen, mild volume ascites present within the left of the pancreas possible pancreatitis. Body wall edema seen. Gallbladder hydropic. Moderate steatosis of the liver seen. NG tube in the stomach. Fluid-filled colon which could indicate  diarrhea.   No gross acute bony abnormality seen.         Impression:        1. Markedly limited examination with no gross pulmonary emboli.    2. Pneumomediastinum. Correlate with ventilation settings.  3. Significant bilateral groundglass infiltrates with lower lobe consolidation and new cavitary process with filling defect in the right upper lobe. Correlate for superimposed opportunistic infection.  4. Marked abnormalities in the upper abdomen suspicious for pancreatitis.  5. Incidental fluid filled colon loops that may be correlated clinically with diarrhea.      6. moderate steatosis of the liver, correlate with LFTs.    Signer Name: Rebecca Redman MD   Signed: 9/26/2021 11:35 AM   Workstation Name: [facility]    [facility] [place]    US Venous Doppler Lower Extremity Bilateral (duplex) [[id number]] Collected: 09/25/21 1526     Updated: 09/25/21 1529    Narrative:      US Veins LE Duplex BILAT    HISTORY:   Pancreatitis, shock, positive d-dimer     TECHNIQUE:   Real-time ultrasound was performed of both lower extremities utilizing spectral and color Doppler with compression and augmentation techniques.    COMPARISON:  None available.    FINDINGS:  Right Lower Extremity:  There is no deep venous thrombus seen in the right lower extremity,  including the right common femoral veins, right femoral veins and right popliteal veins.  Normal compressibility and respiratory phasicity was visualized.  No calf vein thrombus. Greater  saphenous vein is patent.    Left Lower Extremity:  There is no deep venous thrombus seen in the left lower extremity, including the left common femoral veins, left femoral veins and left popliteal veins.   Normal compressibility and respiratory phasicity was visualized.  No calf vein thrombus. Greater  saphenous vein is patent.      Impression:      No deep venous thrombus seen in either lower extremity.    Signer Name: Aga Ness MD   Signed: 9/25/2021 3:26 PM   Workstation Name: [id number]    [facility]    XR Chest 1 View [[id number]] Collected: 09/25/21 1011     Updated: 09/25/21 1013    Narrative:      PROCEDURE: CR Chest 1 Vw    COMPARISON:  9/24/2021     INDICATIONS: respiratory failure; Other specified diabetes mellitus with ketoacidosis with coma; Acute respiratory failure with hypoxia;COVID-19;Acute kidney failure, unspecified;Acute pancreatitis without necrosis or infection, unspecified;  Gastroduodenitis, unspecified, without bleeding; Unspecified bacterial pneumonia; Other disorders of phosphorus metabolism; Shock, unspecified  Relevant clinical info: Covid+, respiratory failure      TECHNIQUE: Single AP  view of the chest    FINDINGS: Tubes and support lines are in satisfactory position. Lung volumes appear slightly increased. Cardiac silhouette is still pronounced with perihilar infiltrates and mild consolidation in the left perihilar region with minimal air bronchograms.  There is improved aeration but persistent right upper lobe patchy infiltrates. Subtle infiltrate seen in the left lung. No gross lobar collapse. Osseous structures are stable.      Impression:      Improvement since prior study with continued significant bilateral infiltrates         Signer Name: Rebecca Redman MD   Signed:  9/25/2021 10:11 AM   Workstation Name: [facility]    [facility] [place]    XR Chest 1 View [[id number]] Collected: 09/24/21 0852     Updated: 09/24/21 0854    Narrative:      EXAM:    XR Chest, 1 View     EXAM DATE:    9/24/2021 5:55 AM     CLINICAL HISTORY:    f/up; E13.11-Other specified diabetes mellitus with ketoacidosis with  coma; J96.01-Acute respiratory failure with hypoxia; U07.1-COVID-19;  N17.9-Acute kidney failure, unspecified; K85.90-Acute pancreatitis  without necrosis or infection, unspecified; K29.90-Gastroduodenitis,  unspecified, without bleeding; J15.9-Unspecified bacterial pneumonia;  E83.39-Other disorders of phosphorus metabolism; R57.9     TECHNIQUE:    Frontal view of the chest.     COMPARISON:    09/22/2021     FINDINGS:    LUNGS:  Patchy bilateral airspace disease probably on the right is  slightly worse.    PLEURAL SPACE:  Unremarkable.  No pneumothorax.    HEART:  Heart size is stable.    MEDIASTINUM:  Unremarkable.    BONES/JOINTS:  Unremarkable.    TUBES, LINES AND DEVICES:  ET tube tip below level clavicles. NG tube  in the stomach. Double right central line with tip in SVC.       Impression:        Patchy bilateral airspace disease probably on the right is slightly  worse.     This report was finalized on 9/24/2021 8:52 AM by Dr. Stevie Almonte MD.       XR Chest 1 View [[id number]] Collected: 09/22/21 0919     Updated: 09/22/21 1001    Narrative:      EXAM:    XR Chest, 1 View     EXAM DATE:    9/22/2021 8:45 AM     CLINICAL HISTORY:    resp failure, covid     TECHNIQUE:    Frontal view of the chest.     COMPARISON:    09/21/2021     FINDINGS:    LUNGS:  Patchy bilateral airspace disease is again noted.    PLEURAL SPACE:  Unremarkable.  No pneumothorax.    HEART:  Unremarkable.  No cardiomegaly.    MEDIASTINUM:  Unremarkable.    BONES/JOINTS:  Unremarkable.    TUBES, LINES AND DEVICES:  ET tube tip at level below level clavicles.  Double central lines with tip in SVC.  NG tube in the stomach.       Impression:        Patchy bilateral airspace disease is again noted.     This report was finalized on 9/22/2021 9:19 AM by Dr. Stevie Almonte MD.       XR Chest 1 View [[id number]] Collected: 09/21/21 0847     Updated: 09/21/21 0850    Narrative:      EXAM:    XR Chest, 1 View     EXAM DATE:    9/21/2021 6:13 AM     CLINICAL HISTORY:    Intubated Patient; E13.11-Other specified diabetes mellitus with  ketoacidosis with coma; J96.01-Acute respiratory failure with hypoxia;  U07.1-COVID-19; N17.9-Acute kidney failure, unspecified; K85.90-Acute  pancreatitis without necrosis or infection, unspecified;  K29.90-Gastroduodenitis, unspecified, without bleeding;  J15.9-Unspecified bacterial pneumonia; E83.39-Other disorders of  phosphorus meta     TECHNIQUE:    Frontal view of the chest.     COMPARISON:    09/20/2021     FINDINGS:    LUNGS:  Again noted is patchy bilateral airspace disease slightly  worse on today's study.    PLEURAL SPACE:  Unremarkable.  No pneumothorax.    HEART:  Unremarkable.  No cardiomegaly.    MEDIASTINUM:  Unremarkable.    BONES/JOINTS:  Unremarkable.    TUBES, LINES AND DEVICES:  ET tube tip at level clavicles. NG tube in  the stomach both right central lines with tip in SVC.       Impression:        Again noted is patchy bilateral airspace disease slightly worse on  today's study.     This report was finalized on 9/21/2021 8:48 AM by Dr. Stevie Almonte MD.       XR Chest 1 View [[id number]] Collected: 09/20/21 1501     Updated: 09/20/21 1503    Narrative:      EXAM:    XR Chest, 1 View     EXAM DATE:    9/20/2021 2:42 PM     CLINICAL HISTORY:    po; E13.11-Other specified diabetes mellitus with ketoacidosis with  coma; J96.01-Acute respiratory failure with hypoxia; U07.1-COVID-19;  N17.9-Acute kidney failure, unspecified; K85.90-Acute pancreatitis  without necrosis or infection, unspecified; K29.90-Gastroduodenitis,  unspecified, without bleeding; J15.9-Unspecified  bacterial pneumonia;  E83.39-Other disorders of phosphorus metabolism; R57.9-S     TECHNIQUE:    Frontal view of the chest.     COMPARISON:    09/20/2021     FINDINGS:    LUNGS:  Patchy bibasilar airspace disease appears slightly improved.    PLEURAL SPACE:  Unremarkable.  No pneumothorax.    HEART:  Heart size is stable.    MEDIASTINUM:  Unremarkable.    BONES/JOINTS:  Unremarkable.    TUBES, LINES AND DEVICES:  ET tube tip below level clavicles. NG tube  in stomach. Right central lines are noted.       Impression:        Patchy bibasilar airspace disease appears slightly improved.     This report was finalized on 9/20/2021 3:01 PM by Dr. Stevie Almonte MD.       XR Chest 1 View [[id number]] Collected: 09/20/21 0846     Updated: 09/20/21 0849    Narrative:      EXAM:    XR Chest, 1 View     EXAM DATE:    9/20/2021 4:59 AM     CLINICAL HISTORY:    Intubated Patient; E13.11-Other specified diabetes mellitus with  ketoacidosis with coma; J96.01-Acute respiratory failure with hypoxia;  U07.1-COVID-19; N17.9-Acute kidney failure, unspecified; K85.90-Acute  pancreatitis without necrosis or infection, unspecified;  K29.90-Gastroduodenitis, unspecified, without bleeding;  J15.9-Unspecified bacterial pneumonia; E83.39-Other disorders of  phosphorus meta     TECHNIQUE:    Frontal view of the chest.     COMPARISON:    09/19/2021     FINDINGS:    LUNGS:  Patchy bilateral airspace disease again noted.    PLEURAL SPACE:  Unremarkable.  No pneumothorax.    HEART:  Heart size is stable.    MEDIASTINUM:  Unremarkable.    BONES/JOINTS:  Unremarkable.    TUBES, LINES AND DEVICES:  ET tube tip below level clavicles. NG tube  in the stomach. Right central line with tip in SVC.       Impression:        Patchy bilateral airspace disease again noted.     This report was finalized on 9/20/2021 8:47 AM by Dr. Stevie Almonte MD.       XR Chest 1 View [[id number]] Collected: 09/19/21 1134     Updated: 09/19/21 1138    Narrative:      EXAM:    XR  Chest, 1 View     EXAM DATE:    9/19/2021 5:53 AM     CLINICAL HISTORY:    intubated; E13.11-Other specified diabetes mellitus with ketoacidosis  with coma; J96.01-Acute respiratory failure with hypoxia;  U07.1-COVID-19; N17.9-Acute kidney failure, unspecified; K85.90-Acute  pancreatitis without necrosis or infection, unspecified;  K29.90-Gastroduodenitis, unspecified, without bleeding;  J15.9-Unspecified bacterial pneumonia; E83.39-Other disorders of  phosphorus metabolism;     TECHNIQUE:    Frontal view of the chest.     COMPARISON:    09/18/2021     FINDINGS:    Lungs:  Soft tissue emphysema left neck.  Bilateral airspace disease  is stable.    Pleural space:  Unremarkable.  No pneumothorax.    Heart:  Unremarkable.  No cardiomegaly.    Mediastinum:  Interval development of pneumomediastinum.    Bones/joints:  Unremarkable.    Tubes, lines and devices:  ET tube with tip at level of clavicles.  NG  tube extends into the stomach.  Right IJ deep line noted with tip at the  cavoatrial junction.       Impression:      1.  Interval development of pneumomediastinum.  2.  Support devices as above.  3.  Otherwise stable chest.     Findings communicate with the referring provider at 11:36 AM on  09/19/2021.     This report was finalized on 9/19/2021 11:36 AM by Dr. James Thomason MD.       XR Chest 1 View [[id number]] Collected: 09/18/21 1146     Updated: 09/18/21 1149    Narrative:      EXAM:    XR Chest, 1 View     EXAM DATE:    9/18/2021 5:10 AM     CLINICAL HISTORY:    intubated; E13.11-Other specified diabetes mellitus with ketoacidosis  with coma; J96.01-Acute respiratory failure with hypoxia;  U07.1-COVID-19; N17.9-Acute kidney failure, unspecified; K85.90-Acute  pancreatitis without necrosis or infection, unspecified;  K29.90-Gastroduodenitis, unspecified, without bleeding;  J15.9-Unspecified bacterial pneumonia; E83.39-Other disorders of  phosphorus metabolism;     TECHNIQUE:    Frontal view of the chest.      COMPARISON:    09/17/2021     FINDINGS:    Lungs:  Patchy bilateral airspace disease slightly worsened from the  previous exam.    Pleural space:  Unremarkable.  No pneumothorax.    Heart:  Unremarkable.  No cardiomegaly.    Mediastinum:  Unremarkable.    Bones/joints:  Unremarkable.    Tubes, lines and devices:  ET tube with tip at level of clavicles.  NG  tube extends into the stomach.  Right IJ deep line noted with tip at the  cavoatrial junction.       Impression:      1.  Slight worsening of bilateral airspace disease.  2.  Support devices as detailed above.     This report was finalized on 9/18/2021 11:47 AM by Dr. James Thomason MD.       XR Chest 1 View [[id number]] Collected: 09/17/21 1126     Updated: 09/17/21 1129    Narrative:      EXAM:    XR Chest, 1 View     EXAM DATE:    9/17/2021 9:07 AM     CLINICAL HISTORY:    intubated; E13.11-Other specified diabetes mellitus with ketoacidosis  with coma; J96.01-Acute respiratory failure with hypoxia;  U07.1-COVID-19; N17.9-Acute kidney failure, unspecified; K85.90-Acute  pancreatitis without necrosis or infection, unspecified;  K29.90-Gastroduodenitis, unspecified, without bleeding;  J15.9-Unspecified bacterial pneumonia; E83.39-Other disorders of  phosphorus metabolism;     TECHNIQUE:    Frontal view of the chest.     COMPARISON:    09/16/2021     FINDINGS:    Lungs:  Minimal improvement in bilateral airspace disease.    Pleural space:  Unremarkable.  No pneumothorax.    Heart:  Unremarkable.  No cardiomegaly.    Mediastinum:  Unremarkable.    Bones/joints:  Unremarkable.    Tubes, lines and devices:  ET tube with tip at level of clavicles.  NG  tube extends into the stomach.  Right IJ deep line with tip at  cavoatrial junction.       Impression:      1.  Support device positioning as detailed above. ET tube now at level  of clavicles well above the jono.  2.  Slight improvement in bilateral airspace disease.     This report was finalized on 9/17/2021  11:27 AM by Dr. James Thomason MD.       US Gallbladder [[id number]] Collected: 09/16/21 1409     Updated: 09/16/21 1422    Narrative:      US GALLBLADDER-     CLINICAL INDICATION: acute pancreatitis, assess for gallstones;  E13.11-Other specified diabetes mellitus with ketoacidosis with coma;  J96.01-Acute respiratory failure with hypoxia; U07.1-COVID-19;  N17.9-Acute kidney failure, unspecified; K85.90-Acute pancreatitis  without necrosis or infection, unspecified; K29.90-Gastroduodenitis,  unspecified, without bleeding; J15.9-Unspecified bacterial pneumonia;  E83.39-Other diso        COMPARISON: None available         PROCEDURE AND FINDINGS:     Sonographic imaging of the gallbladder reveals no evidence of  cholelithiasis.  There is no pericholecystic fluid.  The wall of the gallbladder measures 2.26 mm.  The common bile duct measures 1.62 mm.             Impression:      Impression: No evidence of cholelithiasis.     This report was finalized on 9/16/2021 2:09 PM by Dr. Jc Tovar MD.       US Venous Doppler Lower Extremity Right (duplex) [[id number]] Collected: 09/16/21 1254     Updated: 09/16/21 1256    Narrative:      US VENOUS DOPPLER LOWER EXTREMITY RIGHT (DUPLEX)-     CLINICAL INDICATION: edema; E13.11-Other specified diabetes mellitus  with ketoacidosis with coma; J96.01-Acute respiratory failure with  hypoxia; U07.1-COVID-19; N17.9-Acute kidney failure, unspecified;  K85.90-Acute pancreatitis without necrosis or infection, unspecified;  K29.90-Gastroduodenitis, unspecified, without bleeding;  J15.9-Unspecified bacterial pneumonia; E83.39-Other disorders of  phosphorus metabolism; R57.        COMPARISON: None available      TECHNIQUE: Color Doppler imaging was used with compression and  augmentation to evaluate the right lower extremity deep venous system.     FINDINGS:   There is patent spontaneous flow from the common femoral vein through  the posterior tibial veins.  There was no internal clot or  area of noncompressibility in the right  lower extremity.  Normal augmentation was elicited where applicable.          Impression:      No DVT in the right lower extremity on today's exam.      This report was finalized on 9/16/2021 12:54 PM by Dr. Jc Tovar MD.       XR Chest 1 View [[id number]] Collected: 09/16/21 1035     Updated: 09/16/21 1039    Narrative:      EXAM:    XR Chest, 1 View     EXAM DATE:    9/16/2021 8:30 AM     CLINICAL HISTORY:    intubated; E13.11-Other specified diabetes mellitus with ketoacidosis  with coma; J96.01-Acute respiratory failure with hypoxia;  U07.1-COVID-19; N17.9-Acute kidney failure, unspecified; K85.90-Acute  pancreatitis without necrosis or infection, unspecified;  K29.90-Gastroduodenitis, unspecified, without bleeding;  J15.9-Unspecified bacterial pneumonia; E83.39-Other disorders of  phosphorus metabolism;     TECHNIQUE:    Frontal view of the chest.     COMPARISON:    09/15/2021     FINDINGS:    Lungs:  Consolidation left lung base is stable.  Perihilar opacities  are stable.    Pleural space:  Unremarkable.  No pneumothorax.    Heart:  Unremarkable.  No cardiomegaly.    Mediastinum:  Unremarkable.    Bones/joints:  Unremarkable.    Tubes, lines and devices:  Tip of NG tube extends into the stomach.   Tip of ET tube is slightly above the jono and should be retracted  slightly approximately 2 cm.  Right IJ deep line positioning is stable.       Impression:      1.  Stable bilateral airspace disease.  2.  Stable positioning of support devices.  3.  Consider retracting ET tube slightly, approximately 1.5-2.0 cm.     This report was finalized on 9/16/2021 10:36 AM by Dr. James Thomason MD.       XR Chest 1 View [[id number]] Collected: 09/15/21 1840     Updated: 09/15/21 1854    Narrative:      EXAM:    XR Chest, 1 View     EXAM DATE:    9/15/2021 6:17 PM     CLINICAL HISTORY:    intubated; E13.11-Other specified diabetes mellitus with ketoacidosis  with coma; J96.01-Acute  respiratory failure with hypoxia;  U07.1-COVID-19; N17.9-Acute kidney failure, unspecified; K85.90-Acute  pancreatitis without necrosis or infection, unspecified;  K29.90-Gastroduodenitis, unspecified, without bleeding;  J15.9-Unspecified bacterial pneumonia; E83.39-Other disorders of  phosphorus metabolism;     TECHNIQUE:    Frontal view of the chest.     COMPARISON:    09/14/2021     FINDINGS:    Lungs:  Left basilar consolidation is noted.  Bilateral perihilar  airspace disease stable.    Pleural space:  Unremarkable.  No pneumothorax.    Heart:  Unremarkable.  No cardiomegaly.    Mediastinum:  Unremarkable.    Bones/joints:  Unremarkable.    Tubes, lines and devices:  Right IJ deep line positioning is stable.   ET tube extends below clavicles and just above jono.  NG tube extends  into the stomach.       Impression:      1.  Some increase in left basilar consolidation.  2.  Support devices as detailed above.  3.  Otherwise stable chest.     This report was finalized on 9/15/2021 6:41 PM by Dr. James Thomason MD.       NM Lung Scan Perfusion Particulate [[id number]] Collected: 09/14/21 1804     Updated: 09/14/21 1806    Narrative:      EXAMINATION: NM LUNG SCAN PERFUSION PARTICULATE-      CLINICAL INDICATION: pe r/o; J96.01-Acute respiratory failure with  hypoxia; E13.11-Other specified diabetes mellitus with ketoacidosis with  coma; U07.1-COVID-19; N17.9-Acute kidney failure, unspecified     TECHNIQUE:  3.5 mCi of Tc-99m MAA were administered IV for a perfusion  lung scan. Ventilation could not be performed.     COMPARISON: Chest XRAY performed on same day.     FINDINGS: Multiple perfusion images demonstrate a relatively homogeneous  pattern of pulmonary capillary tracer distribution throughout the lungs.   Specifically, there are no segmental or sub-segmental defects to  suggest pulmonary embolism.        Impression:      Low suspicion of PE.      This report was finalized on 9/14/2021 6:04 PM by Dr. James AMBROSIO  MD Paddy.       CT Chest Without Contrast Diagnostic [[id number]] Collected: 09/14/21 1802     Updated: 09/14/21 1806    Narrative:      EXAM:    CT Chest Without Intravenous Contrast     EXAM DATE:    9/14/2021 5:09 PM     CLINICAL HISTORY:    Chest wall pain; J96.01-Acute respiratory failure with hypoxia;  E13.11-Other specified diabetes mellitus with ketoacidosis with coma;  U07.1-COVID-19; N17.9-Acute kidney failure, unspecified     TECHNIQUE:    Axial computed tomography images of the chest without intravenous  contrast.  Sagittal and coronal reformatted images were created and  reviewed.  This CT exam was performed using one or more of the following  dose reduction techniques:  automated exposure control, adjustment of  the mA and/or kV according to patient size, and/or use of iterative  reconstruction technique.     COMPARISON:    No relevant prior studies available.     FINDINGS:    Lungs:  Groundglass opacities both lungs in a pattern and distribution  typical for COVID pneumonia.  More dense consolidation in the bilateral  lower lobes with air bronchograms May represent superimposed bacterial  pneumonia.    Pleural space:  No evidence of pneumothorax.  No significant effusion.    Heart:  Unremarkable.  No cardiomegaly.  No significant pericardial  effusion.    Bones/joints:  Unremarkable.  No acute fracture.  No dislocation.    Soft tissues:  Unremarkable.    Vasculature:  Unremarkable.  No thoracic aortic aneurysm.    Lymph nodes:  Unremarkable.  No enlarged lymph nodes.    Liver:  Fatty infiltration of liver.    Pancreas:  Inflammation around the pancreas possibly pancreatitis.    Tubes, lines and devices:  ET tube with tip above level of jono.  NG  tube extends into the stomach.       Impression:      1.  Bilateral confluent and patchy groundglass opacities with CT  features typical for COVID pneumonia, moderate in extent.  2.  Dense consolidation noted in the lower lobe regions most consistent  with  superimposed bacterial pneumonia.  3.  Support devices as above.  4.  Probable changes of pancreatitis.  5.  Fatty liver.  6.  No pneumothorax. Other nonacute findings above.     This report was finalized on 9/14/2021 6:04 PM by Dr. James Thomason MD.       CT Abdomen Pelvis Without Contrast [[id number]] Collected: 09/14/21 1757     Updated: 09/14/21 1804    Narrative:      EXAM:    CT Abdomen and Pelvis Without Intravenous Contrast     EXAM DATE:    9/14/2021 5:09 PM     CLINICAL HISTORY:    Abdominal pain, acute, nonlocalized; J96.01-Acute respiratory failure  with hypoxia; E13.11-Other specified diabetes mellitus with ketoacidosis  with coma; U07.1-COVID-19; N17.9-Acute kidney failure, unspecified     TECHNIQUE:    Axial computed tomography images of the abdomen and pelvis without  intravenous contrast.  Sagittal and coronal reformatted images were  created and reviewed.  This CT exam was performed using one or more of  the following dose reduction techniques:  automated exposure control,  adjustment of the mA and/or kV according to patient size, and/or use of  iterative reconstruction technique.     COMPARISON:    No relevant prior studies available.     FINDINGS:    Lung bases:  Consolidative and groundglass airspace disease throughout  the visualized lung bases compatible with COVID pneumonia complicated by  probable bacterial pneumonia in the basal segments.    Heart:  Moderate cardiomegaly.      ABDOMEN:    Liver:  Fatty infiltration of liver is noted.    Gallbladder and bile ducts:  Unremarkable.  No calcified stones.  No  ductal dilation.    Pancreas:  Inflammation centered around the pancreas as well as the  gastric antrum and first portion of duodenum.  No ductal dilation.    Spleen:  Unremarkable.  No splenomegaly.    Adrenals:  Unremarkable.  No mass.    Kidneys and ureters:  Unremarkable.  No obstructing stones.  No  hydronephrosis.    Stomach and bowel:  Mild wall thickening of the gastric antrum  and  first portion of duodenum.      PELVIS:    Appendix:  No findings to suggest acute appendicitis.    Bladder:  Fernandez catheter partially decompresses urinary bladder.  No  stones.    Reproductive:  Unremarkable as visualized.      ABDOMEN and PELVIS:    Intraperitoneal space:  No pneumoperitoneum identified.  No  significant fluid collection.    Bones/joints:  Degenerative changes lower lumbar spine.  No acute  fracture.  No dislocation.    Soft tissues:  Unremarkable.    Vasculature:  Unremarkable.  No abdominal aortic aneurysm.    Lymph nodes:  Unremarkable.  No enlarged lymph nodes.    Tubes, lines and devices:  NG tube extends into the mid-distal  stomach.       Impression:      1.  Inflammation surrounding the pancreas as well as inflammation along  the gastric antrum and first portion of duodenum. Favor acute  pancreatitis and secondary inflammation of the GI tract. Primary  gastroduodenitis also considered.  2.  Diffuse fatty infiltration of liver.  3.  Support devices as above.  4.  Bibasilar groundglass opacities and consolidative airspace disease  most consistent with COVID pneumonia probably complicated by bacterial  pneumonia.  5.  Other nonacute findings above.     This report was finalized on 9/14/2021 6:02 PM by Dr. James Thomason MD.       CT Head Without Contrast [[id number]] Collected: 09/14/21 1754     Updated: 09/14/21 1758    Narrative:      EXAM:    CT Head Without Intravenous Contrast     EXAM DATE:    9/14/2021 5:05 PM     CLINICAL HISTORY:    Polytrauma, critical, head/C-spine injury suspected; J96.01-Acute  respiratory failure with hypoxia; E13.11-Other specified diabetes  mellitus with ketoacidosis with coma; U07.1-COVID-19; N17.9-Acute kidney  failure, unspecified     TECHNIQUE:    Axial computed tomography images of the head/brain without intravenous  contrast.  Sagittal and coronal reformatted images were created and  reviewed.  This CT exam was performed using one or more of the  following  dose reduction techniques:  automated exposure control, adjustment of  the mA and/or kV according to patient size, and/or use of iterative  reconstruction technique.     COMPARISON:    MRI 08/24/2021     FINDINGS:    Brain:  Diffuse cerebral atrophy is noted.  Chronic small vessel  ischemic disease noted.  No hemorrhage.    Ventricles:  Unremarkable.  No ventriculomegaly.    Bones/joints:  Unremarkable.  No acute fracture.    Soft tissues:  Unremarkable.    Sinuses:  Unremarkable as visualized.  No acute sinusitis.    Mastoid air cells:  Unremarkable as visualized.  No mastoid effusion.       Impression:      1.  No CT evidence of acute intracranial abnormality.  2.  Atrophy and chronic small vessel ischemic disease.     This report was finalized on 9/14/2021 5:56 PM by Dr. James Thomason MD.       XR Chest 1 View [[id number]] Collected: 09/14/21 1632     Updated: 09/14/21 1635    Narrative:      EXAM:    XR Chest, 1 View     EXAM DATE:    9/14/2021 4:11 PM     CLINICAL HISTORY:    ETT/OG/central line confirmation; J96.01-Acute respiratory failure  with hypoxia; E13.11-Other specified diabetes mellitus with ketoacidosis  with coma; U07.1-COVID-19; N17.9-Acute kidney failure, unspecified     TECHNIQUE:    Frontal view of the chest.     COMPARISON:    09/14/2021     FINDINGS:    Lungs:  Slight improved aeration of the lungs noted.  Slight  improvement in bilateral airspace disease.    Pleural space:  Unremarkable.  No pneumothorax.    Heart:  Unremarkable.  No cardiomegaly.    Mediastinum:  Unremarkable.    Bones/joints:  Unremarkable.    Tubes, lines and devices:  ET tube with tip at level of clavicles.  NG  tube extends into the stomach.  Right IJ deep line with tip at the  cavoatrial junction.       Impression:      1.  Interval placement of support devices detailed above.  2.  Improved aeration and slight improvement in bilateral airspace  disease.     This report was finalized on 9/14/2021 4:33 PM by  Dr. James Thomason MD.       XR Chest 1 View [[id number]] Collected: 09/14/21 1258     Updated: 09/14/21 1301    Narrative:      EXAM:    XR Chest, 1 View     EXAM DATE:    9/14/2021 11:47 AM     CLINICAL HISTORY:    ams; J96.01-Acute respiratory failure with hypoxia; E13.11-Other  specified diabetes mellitus with ketoacidosis with coma; U07.1-COVID-19     TECHNIQUE:    Frontal view of the chest.     COMPARISON:    08/04/2021     FINDINGS:    Lungs:  Low lung volumes.  Bilateral airspace disease noted and may  rep changes of pneumonia.    Pleural space:  Unremarkable.  No pneumothorax.    Heart:  Unremarkable.  No cardiomegaly.    Mediastinum:  Unremarkable.    Bones/joints:  Unremarkable.       Impression:        Bilateral airspace disease most consistent with pneumonia and could  represent COVID pneumonia.     This report was finalized on 9/14/2021 12:59 PM by Dr. James Thomason MD.               --------------------------------------------------------------------------------------------------  LABS:     The CVD Risk score (Maureen et al., 2008) failed to calculate for the following reasons:    The 2008 CVD risk score is only valid for ages 30 to 74    The patient has a prior MI, stroke, CHF, or peripheral vascular disease diagnosis         Lab Results   Component Value Date    GLUCOSE 153 (H) 10/01/2021    BUN 61 (H) 10/01/2021    CREATININE 3.70 (H) 10/01/2021    EGFRIFNONA 18 (L) 10/01/2021    EGFRIFAFRI  09/29/2021      Comment:      <15 Indicative of kidney failure.    BCR 16.5 10/01/2021    K 4.0 10/01/2021    CO2 20.4 (L) 10/01/2021    CALCIUM 7.8 (L) 10/01/2021    ALBUMIN 2.28 (L) 10/01/2021    AST 9 10/01/2021    ALT 6 10/01/2021     Lab Results   Component Value Date    WBC 13.52 (H) 10/01/2021    HGB 8.4 (L) 10/01/2021    HCT 25.1 (L) 10/01/2021    MCV 93.0 10/01/2021     (L) 10/01/2021     Lab Results   Component Value Date    CHOL 95 09/16/2021    TRIG 315 (H) 09/24/2021    HDL 16 (L)  09/16/2021    LDL 55 09/16/2021     Lab Results   Component Value Date    TSH 0.761 09/14/2021     Lab Results   Component Value Date    CKTOTAL 193 09/14/2021    TROPONINT 0.045 (C) 10/01/2021     Lab Results   Component Value Date    HGBA1C 11.20 (H) 09/14/2021     No results found for: DDIMER  Lab Results   Component Value Date    ALT 6 10/01/2021     Lab Results   Component Value Date    HGBA1C 11.20 (H) 09/14/2021     Lab Results   Component Value Date    CREATININE 3.70 (H) 10/01/2021     Lab Results   Component Value Date    FERRITIN 2,438.00 (H) 10/01/2021     Lab Results   Component Value Date    INR 0.91 09/28/2021    INR 1.02 09/15/2021    INR 0.87 (L) 08/04/2021    PROTIME 12.7 (L) 09/28/2021    PROTIME 13.8 09/15/2021    PROTIME 12.2 (L) 08/04/2021       This document has been electronically signed by Austin Madrid DO on October 1, 2021 12:05 EDT

## 2021-10-01 NOTE — PROGRESS NOTES
Antimicrobial Length of Therapy:    Day 13 of 21 vancomycin IV  Day 13 of 21 meropenem  Day 2 of 21 voriconazole    Thank you.  Flakita Sprague, Pharm.D.  10/1/2021  12:31 EDT

## 2021-10-01 NOTE — PROGRESS NOTES
[facility] HOSPITALIST PROGRESS NOTE     Patient Identification:  Name:  Martinez Mckenna  Age:  51 y.o.  Sex:  male  :  1970  MRN:  [id number]  Visit Number:  [id number]  ROOM: [address]     Primary Care Provider:  Rafal Casillas MD    Length of stay in inpatient status:  16    Subjective     Chief Compliant:    Chief Complaint   Patient presents with   • Respiratory Distress     History of Presenting Illness:    Patient remains critically ill, intubated for airway protection, on pressors for lower Bps, has been intubated for prolonged period, continued on Abx and renewed for 3 week course as per pulm recs, ID following, aspergillus Ag came back + yesterday and changed to voriconazole, overnight some c/f nonspecific ST/Twave changes and trops obtained by AICU and was positive, repeat pending but cards consulted, previous Echo reviewed from admission and unremarkable, discussed w/ pulm and ID, weaning steroids to daily, AM ABG reviewed and decreased TV, repeat ABG pending, remains afebrile, BP stable, HR stable as of now but prognosis still remains guarded, discussed w/ Palliative care who are continuing GOC conversation w/ patient's father.  Objective     Current Hospital Meds:artificial tears, , Both Eyes, Q4H  chlorhexidine, 15 mL, Mouth/Throat, Q12H  dexamethasone, 6 mg, Intravenous, Q24H  insulin aspart, 0-14 Units, Subcutaneous, Q6H  insulin detemir, 7 Units, Subcutaneous, Q12H  meropenem, 500 mg, Intravenous, Q24H  nystatin, , Topical, Q12H  pantoprazole, 40 mg, Intravenous, Q12H  polyethylene glycol, 17 g, Oral, Daily  senna-docusate sodium, 1 tablet, Oral, BID  sodium bicarbonate, 100 mEq, Intravenous, Once  sodium bicarbonate, 1,300 mg, Oral, TID  sodium chloride, 10 mL, Intravenous, Q12H  sodium chloride, 10 mL, Intravenous, Q12H  sodium chloride, 10 mL, Intravenous, Q12H  sodium chloride, 10 mL, Intravenous, Q12H  voriconazole, 200 mg, Per G Tube, Q12H    dexmedetomidine, 0.2-1.5  mcg/kg/hr, Last Rate: 1.5 mcg/kg/hr (10/01/21 0605)  fentaNYL Citrate,   heparin (porcine), 9.8 Units/kg/hr, Last Rate: 13.8 Units/kg/hr (10/01/21 0806)  Midazolam 1 mg/mL 100mL NS, 1-10 mg/hr, Last Rate: 10 mg/hr (10/01/21 0346)  norepinephrine, 0.02-0.3 mcg/kg/min, Last Rate: 0.02 mcg/kg/min (09/30/21 0947)  Pharmacy to dose vancomycin,   propofol, 5-50 mcg/kg/min, Last Rate: 50 mcg/kg/min (10/01/21 1130)  vecuronium (NORCURON) infusion, 0.6-1.2 mcg/kg/min, Last Rate: 0.6 mcg/kg/min (10/01/21 0605)      Current Antimicrobial Therapy:  Anti-Infectives (From admission, onward)    Ordered     Dose/Rate Route Frequency Start Stop    09/30/21 1426  voriconazole (VFEND) tablet 200 mg     Ordering Provider: Chloe Garvey MD    200 mg Per G Tube Every 12 Hours 10/01/21 1800 10/21/21 1759    10/01/21 1152  meropenem (MERREM) 500 mg in sodium chloride 0.9 % 100 mL IVPB-VTB     Ordering Provider: Kieran Rene MD    500 mg  over 3 Hours Intravenous Every 24 Hours 10/01/21 1245 10/17/21 1244    10/01/21 1152  Pharmacy to dose vancomycin     Ordering Provider: Kieran Rene MD     Does not apply Continuous PRN 10/01/21 1151 10/17/21 1150    09/30/21 1426  voriconazole (VFEND) 500 mg in dextrose (D5W) 5 % 100 mL IVPB     Ordering Provider: Chloe Garvey MD    6 mg/kg × 83.2 kg (Adjusted)  over 60 Minutes Intravenous Every 12 Hours 09/30/21 1600 10/01/21 0520    09/29/21 1501  vancomycin 1 g/250 mL 0.9% NS (vial-mate)     Ordering Provider: Kieran Rene MD    1,000 mg  over 60 Minutes Intravenous Once 09/29/21 1600 09/29/21 1859    09/27/21 1300  vancomycin 1 g/250 mL 0.9% NS (vial-mate)     Ordering Provider: Kieran Rene MD    1,000 mg  over 60 Minutes Intravenous Once 09/27/21 1500 09/27/21 1813    09/26/21 1426  Vancomycin Pharmacy Intermittent Dosing     Ordering Provider: Jaqui Tapia PA     Does not apply Daily 09/26/21 1515 10/01/21 0859    09/26/21 1408  meropenem (MERREM) 500 mg in sodium  chloride 0.9 % 100 mL IVPB-VTB     Ordering Provider: Jaqui Tapia PA    500 mg  over 3 Hours Intravenous Every 24 Hours 09/26/21 1500 09/30/21 1739    09/23/21 1302  vancomycin 1 g/250 mL 0.9% NS (vial-mate)     Ordering Provider: Chloe Garvey MD    1,000 mg  over 60 Minutes Intravenous Once 09/23/21 1400 09/23/21 1551    09/21/21 1259  meropenem (MERREM) 500 mg in sodium chloride 0.9 % 100 mL IVPB-VTB     Ordering Provider: Chloe Garvey MD    500 mg  over 3 Hours Intravenous Every 24 Hours 09/22/21 1400 09/26/21 1707    09/21/21 1257  vancomycin 1 g/250 mL 0.9% NS (vial-mate)     Ordering Provider: Chloe Garvey MD    1,000 mg  over 60 Minutes Intravenous Once 09/21/21 1400 09/21/21 1735    09/19/21 0947  Vancomycin Pharmacy Intermittent Dosing     Ordering Provider: Chloe Garvey MD     Does not apply Daily 09/20/21 0900 09/26/21 0859    09/19/21 0947  vancomycin 1750 mg/500 mL 0.9% NS IVPB ([facility])     Ordering Provider: Chloe Garvey MD    20 mg/kg × 90.1 kg  over 120 Minutes Intravenous Once 09/19/21 1200 09/19/21 1454    09/19/21 0947  meropenem (MERREM) 500 mg in sodium chloride 0.9 % 100 mL IVPB-VTB     Ordering Provider: Chloe Garvey MD    500 mg  over 30 Minutes Intravenous Once 09/19/21 1200 09/19/21 1325    09/15/21 0443  remdesivir 100 mg in 270 mL NS     Ordering Provider: Adama Brown MD    100 mg  over 60 Minutes Intravenous Every 24 Hours 09/16/21 0600 09/19/21 1013    09/15/21 0443  remdesivir 200 mg in 290 mL NS     Ordering Provider: Adama Brown MD    200 mg  over 60 Minutes Intravenous Every 24 Hours 09/15/21 0600 09/15/21 0742    09/14/21 1912  vancomycin 2000 mg/500 mL 0.9% NS IVPB ([facility])     Ordering Provider: Gilmer Avitia MD    20 mg/kg × 95.3 kg  over 120 Minutes Intravenous Once 09/14/21 2000 09/14/21 2240 09/14/21 1950  piperacillin-tazobactam (ZOSYN) IVPB 4.5 g in 100 mL NS VTB     Ordering Provider:  Gilmer Avitia MD    4.5 g  over 30 Minutes Intravenous Once 09/14/21 1952 09/14/21 2054 09/14/21 1101  piperacillin-tazobactam (ZOSYN) IVPB 3.375 g in 100 mL NS VTB     Ordering Provider: James Interiano MD    3.375 g  over 30 Minutes Intravenous Once 09/14/21 1103 09/14/21 1202        Current Diuretic Therapy:  Diuretics (From admission, onward)    Ordered     Dose/Rate Route Frequency Start Stop    09/17/21 1314  furosemide (LASIX) injection 80 mg     Ordering Provider: Sb Sullivan MD    80 mg Intravenous Once 09/17/21 1400 09/17/21 1343        ----------------------------------------------------------------------------------------------------------------------  Vital Signs:  Temp:  [97.6 °F (36.4 °C)-99.8 °F (37.7 °C)] 99.8 °F (37.7 °C)  Heart Rate:  [61-91] 71  Resp:  [29] 29  BP: ()/(57-85) 127/74  FiO2 (%):  [80 %] 80 %  SpO2:  [90 %-99 %] 92 %  on   ;   Device (Oxygen Therapy): ventilator  Body mass index is 34.57 kg/m².    Wt Readings from Last 3 Encounters:   10/01/21 106 kg (234 lb 3.2 oz)   08/04/21 95.3 kg (210 lb)     Intake & Output (last 3 days)       09/28 0701 - 09/29 0700 09/29 0701 - 09/30 0700 09/30 0701 - 10/01 0700 10/01 0701 - 10/02 0700    I.V. (mL/kg) 1965.1 (18.7) 2409.7 (23.6) 2319.6 (21.9)     Other 230 110 180     NG/ 953 828     IV Piggyback 200 450 300     Total Intake(mL/kg) 2718.1 (25.9) 3922.7 (38.5) 3627.6 (34.2)     Urine (mL/kg/hr) 295 (0.1) 400 (0.2) 475 (0.2)     Emesis/NG output   0     Other  2500      Stool 0  0     Total Output 295 2900 475     Net +2423.1 +1022.7 +3152.6             Stool Unmeasured Occurrence 2 x  2 x         NPO Diet  ----------------------------------------------------------------------------------------------------------------------  Physical exam:  Constitutional:  Intubated/sedated  No acute distress or discomfort  HENT:  Head:  Normocephalic and atraumatic.  Mouth:  Moist mucous membranes.    Eyes:  Conjunctivae  normal. No scleral icterus.    Neck:  Neck supple.  No JVD present.    Cardiovascular:  Normal rate, regular rhythm and normal heart sounds with no murmur.  Pulmonary/Chest:  Intubated/ventiated, improved BS, on 80% Fi02, no wheezing  Abdominal:  Soft. No distension and no tenderness.   Musculoskeletal:  No tenderness, and no deformity.    Neurological:  Unable to assess due to intubation/sedation  Skin:  Skin is warm and dry. No rash noted. No pallor.   Peripheral vascular:  No clubbing, no cyanosis, trace edema.  : Fernandez in place, no significant urine in collection bag still    *Exam stable today 10/1  ----------------------------------------------------------------------------------------------------------------------  Results from last 7 days   Lab Units 10/01/21  0432 09/30/21  0539 09/29/21  0408 09/28/21  1428 09/28/21  0213   CRP mg/dL 10.11* 10.58* 9.31*  --    < >   WBC 10*3/mm3 13.52* 9.13 12.00*  --    < >   HEMOGLOBIN g/dL 8.4* 8.0* 8.4*  --    < >   HEMATOCRIT % 25.1* 23.7* 26.4*  --    < >   MCV fL 93.0 91.9 96.4  --    < >   MCHC g/dL 33.5 33.8 31.8  --    < >   PLATELETS 10*3/mm3 139* 120* 143  --    < >   INR   --   --   --  0.91  --     < > = values in this interval not displayed.     Results from last 7 days   Lab Units 10/01/21  0433   PH, ARTERIAL pH units 7.416   PO2 ART mm Hg 101.0   PCO2, ARTERIAL mm Hg 35.4   HCO3 ART mmol/L 22.7     Results from last 7 days   Lab Units 10/01/21  0432 09/30/21  0539 09/29/21  0408 09/26/21  0340 09/25/21  0206   SODIUM mmol/L 127* 128* 132*   < > 135*   POTASSIUM mmol/L 4.0 3.9 4.7   < > 3.4*   MAGNESIUM mg/dL  --   --   --   --  2.3   CHLORIDE mmol/L 88* 88* 93*   < > 94*   CO2 mmol/L 20.4* 21.3* 21.4*   < > 23.3   BUN mg/dL 61* 51* 63*   < > 49*   CREATININE mg/dL 3.70* 3.31* 4.59*   < > 3.42*   EGFR IF NONAFRICN AM mL/min/1.73 18* 20* 14*   < > 19*   CALCIUM mg/dL 7.8* 7.2* 7.2*   < > 6.7*   PHOSPHORUS mg/dL  --   --   --   --  7.7*   GLUCOSE mg/dL  153* 146* 44*   < > 81   ALBUMIN g/dL 2.28* 2.30* 2.28*   < > 2.05*   BILIRUBIN mg/dL 0.3 0.4 0.4   < > 0.4   ALK PHOS U/L 92 93 97   < > 153*   AST (SGOT) U/L 9 10 14   < > 17   ALT (SGPT) U/L 6 7 7   < > 11    < > = values in this interval not displayed.   Estimated Creatinine Clearance: 29 mL/min (A) (by C-G formula based on SCr of 3.7 mg/dL (H)).  No results found for: AMMONIA  Results from last 7 days   Lab Units 10/01/21  0825   TROPONIN T ng/mL 0.045*             Glucose   Date/Time Value Ref Range Status   10/01/2021 1137 188 (H) 70 - 130 mg/dL Final     Comment:     Meter: [id number] : [id number] Michelle Lacey   10/01/2021 0527 170 (H) 70 - 130 mg/dL Final     Comment:     Meter: [id number] : [id number] AUSTIN WHITE   09/30/2021 2341 177 (H) 70 - 130 mg/dL Final     Comment:     Meter: [id number] : [id number] CICITAI NAM   09/30/2021 2146 129 70 - 130 mg/dL Final     Comment:     Meter: [id number] : [id number] AUSTIN WHITE   09/30/2021 1814 169 (H) 70 - 130 mg/dL Final     Comment:     Meter: [id number] : [id number] FREEDOM LORELEI   09/30/2021 1258 190 (H) 70 - 130 mg/dL Final     Comment:     Meter: [id number] : [id number] FREEDOM LORELEI   09/30/2021 0635 154 (H) 70 - 130 mg/dL Final     Comment:     Meter: [id number] : [id number] AUSTIN WHITE   09/29/2021 2316 158 (H) 70 - 130 mg/dL Final     Comment:     Meter: [id number] : [id number] AUSTIN WHITE     Lab Results   Component Value Date    TSH 0.761 09/14/2021    FREET4 1.08 09/14/2021     No results found for: PREGTESTUR, PREGSERUM, HCG, HCGQUANT  Pain Management Panel    There is no flowsheet data to display.       Brief Urine Lab Results  (Last result in the past 365 days)      Color   Clarity   Blood   Leuk Est   Nitrite   Protein   CREAT   Urine HCG        09/14/21 1108 Yellow Clear Small (1+) Negative Negative 100 mg/dL (2+)             Blood Culture   Date Value Ref Range Status   09/27/2021 No growth at 4 days   Preliminary   09/27/2021 No growth at 4 days  Preliminary     No results found for: URINECX  No results found for: WOUNDCX  No results found for: STOOLCX  Respiratory Culture   Date Value Ref Range Status   09/28/2021 Light growth (2+) Yeast, Not Candida albicans (A)  Final   09/28/2021 No Normal Respiratory Lanette (A)  Final     No results found for: AFBCX  Results from last 7 days   Lab Units 10/01/21  0432 09/30/21  0539 09/29/21  0408 09/28/21  0213 09/27/21  0110 09/26/21  0340 09/25/21  0207 09/25/21  0206   PROCALCITONIN ng/mL  --   --  1.61*  --  2.26*  --  1.37*  --    CRP mg/dL 10.11* 10.58* 9.31* 10.90* 23.64* 16.20*  --  9.04*     I have personally looked at the labs and they are summarized above.  ----------------------------------------------------------------------------------------------------------------------  Detailed radiology reports for the last 24 hours:  Imaging Results (Last 24 Hours)     Procedure Component Value Units Date/Time    XR Chest 1 View [[id number]] Collected: 10/01/21 0800     Updated: 10/01/21 0803    Narrative:      XR CHEST 1 VW-     CLINICAL INDICATION: vent mgmt; E13.11-Other specified diabetes mellitus  with ketoacidosis with coma; J96.01-Acute respiratory failure with  hypoxia; U07.1-COVID-19; N17.9-Acute kidney failure, unspecified;  K85.90-Acute pancreatitis without necrosis or infection, unspecified;  K29.90-Gastroduodenitis, unspecified, without bleeding;  J15.9-Unspecified bacterial pneumonia; E83.39-Other disorders of  phosphorus metabolism;         COMPARISON: 09/30/2021      TECHNIQUE: Single frontal view of the chest.     FINDINGS:     Trace left effusion and bilateral consolidation  No interval line change  There is no evidence of an acute osseous abnormality.   There are no suspicious-appearing parenchymal soft tissue nodules.          Impression:      Very little interval change since the previous exam     This report was finalized on 10/1/2021 8:00 AM by   "Jc Tovar MD.           Assessment & Plan    51M PMH DM Type II, reportedly tested positive for Covid 19 1 week prior, brought in unresponsive per EMS.     #Severe Sepsis/Septic Shock, Acute Metabolic Encephalopathy & Acute Hypoxic Respiratory Failure requiring intubation and mechanical ventilation 2/2 PNA, Viral, treating for Covid 19 & RSV c/b ARDS and pneumomediastinum but also covering for PNA, Bacterial, treating for MDR Organisms, also treating for PNA, Fungal w/ Candida sp. But now w/ c/f opportunistic infection w/ development of cavitary lesion RUL  - Patient presented w/ to ER via EMS and unresponsive at home, reportedly diagnosed w/ Covid 19 1 week prior, Glc read as \"high\" in the field, intubated upon arrival to ER 9/14.  HR > 90, RR > 20, WBC count > 12K, hypotensive requiring IVFs and pressors, covid +, PNA confirmed on imaging.  Has been admitted and intubated for prolonged period of time, since 9/14, has been on broad spectrum Abx and antifungal therapy but w/ c/f worsening infection, imaging now w/ c/f RUL cavitary lesion.   - Admission labs showed WBC count 16K, CRP 2.12, lactate 6.1, procal 0.35->1.37, Covid 19 +, RSV +, ABG 6.9/23/46, Resp Cx growing candida sp, MRSA negative, Bcx's NGTD, repeat Bcx's 9/27 NGTD  - AFB Cx's pending, Resp culture growing yeast not candida sp  - Echo showed LVEF 56-60%, diastolic dysfunction, otherwise unremarkable   - B/l Venous Duplex negative for DVT  - CT Head showed no acute intracranial abnormality, atrophy and CSVID  - CT Chest w/o contrast showed B/l patchy GGO's typical for Covid 19, moderate disease, dense consolidation b/l LL's; repeat CTPE 9/26 showed no PE, pneumomediastinum, significant b/l GGO's w/ LL consolidation and new cavitary process w/ filling defect in RUL   - VQ scan (perfusion only) showed low suspicion for PE  - ID consulted; Following, s/p Remdesivir  - Pulm consulted; Following, rec'd 3 weeks Abx, repeat imaging 6-8 weeks   - Continue " Vanc, Meropenem and have extended dosing today for 3 weeks total since cavitary lesion noted on 9/26, continue Voriconazole as per ID given elevated Aspergillus galactomannan Ag  - Continue extended course of Dexamethasone and weaned to daily  - Continue Heparin gtt given d-dimer > 20 though no definitive thrombus identified, covering for microvascular thormbi as well, caution given underlying cavitary lung lesion  - Continue Albuterol Inhaler PRN  - Continue IV pressors to keep MAPs > 65 or SBP > 90, on levophed still  - Continue pain and sedation gtt's as indicated  - Continue IV PPI for stress ulcer PPx  - Reviewed AM ABG and decreased TV, repeat ABG pending  - Trend Covid 19 progression labs w/ CBC, CMP, CK, CRP, Ferritin daily and LDH, D-dimer, Fibrinogen q48hrs.  - Monitor in CCU, enhanced droplet/contact isolation precautions, continue 02 but wean as able, SAT/SBT when appropriate, palliative care following for GOC conversations     #Elevated Troponin - New  - Labs showed trop 0.045, previously NML on admission  - EKG showed some c/f nonspecific ST/T wave abnormality overnight  - Echo previously unremarkable, considering repeat limited echo  - Cards consulted; Recs pending  - Continue to monitor on tele, trend trops, trend HR and BP    #Nonoliguric -> Oliguric JACKY 2/2 Sepsis/DKA w/ suspected ATN now on intermittent HD  - B/l Cr 0.7, was 2.8 on admission, trended up to 7.0 on 9/20. Renal US showed unremarkable kidney's, no obstruction noted  - Nephrology consulted; Following for iHD needs    #Acute Pancreatitis, unclear etiology  - Admission labs showed WBC count 16K, CRP 2.12, Lipase > 3000, lactate 6.1, AST mild elevation, Tbili NML, TG's 136, YUDELKA negative, RF NML, Anti-Smith Ab negative, Anti-DS DNA NML, SSA/SSB NML, C3 mildly low, C4 NML, complement borderline low, IGG4 NML, Bcx's NGTD.  CT showed inflammation surrounding the pancreas as well as gastric antrum and first portion of duodenum favoring acute  pancreatitis w/ secondary inflammation of GI tract though also considered gastroduodenitis. GB US showed no evidence of cholelithiasis.  Given serum lipase > 3x ULN, characteristic findings on imaging, patient meets criteria for acute pancreatitis diagnosis.  Patient does have ongoing organ failure which is multifactorial, local complication of secondary GI tract inflammation and could be considered severe disease but truly is multifactorial at this point.  Etiology remains unclear, repeat CTPE 9/26 still showing marked abnormalities upper abdomen suspicious for pancreatitis.     #DKA c/b Acute Encephalopathy/DM Coma, Severe Systemic Acidosis in setting of NIDDM Type II, uncontrolled, now IDDM Type II - DKA Resolved  - Hgb A1c = 11.2%, no home oral agents or SQ insulin per meds rec, given severe encephalopathy/coma on presentation required intubation for airway protection. Admission labs showed Glc up to 1100, AG 39, Bicarb 2.8, Acetone moderate, ABG 6.9/23/46, K 4.5, lactate 6.1; Continue FSBG and SSI, continue Levemir 7U qhs and titrate as indicated    #Electrolyte Abnormalities  - Acute Mild Hyperkalemia - Likely related to renal failure, K up to 5.5, Resolved  - Borderline Hypomagnesemia - Mg down to 1.6, Replaced per protocol, Resolved   - Acute Mild Hypovolemic Hypernatremia (POA & Not POA) - Na up to 152 on admission, resolved then became hyponatremic, today Na 128 and trending  - Acute Mild/Mod Hypocalcemia -  Replacing, on protocol. Corrected calcium 8.3.   - Acute Mild Hypophosphatemia - Phos as low as 2.0, Replaced per protocol, Resolved    #Anxiety/Depression  - Holding home Venlafaxine while intubated    F: TFs  A: Fentanyl  S: Propofol, versed, precedex  T: Heparin gtt  H: HOB elevated   U: Pantoprazole  G: SSI, basal  S: Not ready  B: Doc-senna and miralax  I: RIJ 9/14, Hemodialysis catheter 9/21  D: Vanc, meropenem, voriconazole    Code status: DNR/DNI     Dispo: Continue CCU level care     I have  spent 30 minutes of critical care time (7:05-7:35AM) with > 50% of time spent in direct patient care, evaluating the patient at bedside, reviewing all labs and images, reviewing ABG and adjusting vent settings, reviewing pain and sedation gtt's, reviewing pressor requirement, communicating plan of care w/ nursing staff and consultants (Pulm, ID), utilizing high complexity medical decision making to assess and treat vital organ system failure in an individual who has impairment of one or more vital organ systems such that there is a high probability of imminent or life threatening deterioration in the patient’s condition. Failure to initiate the above interventions on an urgent basis would likely result in sudden, clinically significant or life threatening deterioration in the patient's condition.    VTE Prophylaxis:   Mechanical Order History:     None      Pharmalogical Order History:      Ordered     Dose Route Frequency Stop    09/28/21 1239  heparin (porcine) 5000 UNIT/ML injection 5,000 Units      5,000 Units IV Once 09/28/21 1439    09/28/21 1239  heparin 01124 units/250 mL (100 units/mL) in 0.45 % NaCl infusion  9.99 mL/hr      9.8 Units/kg/hr IV Titrated --    09/28/21 1239  heparin (porcine) 5000 UNIT/ML injection 5,000 Units      5,000 Units IV As Needed --    09/28/21 1239  heparin (porcine) 5000 UNIT/ML injection 2,500 Units      2,500 Units IV As Needed --    09/23/21 1512  heparin (porcine) 5000 UNIT/ML injection 5,000 Units  Status:  Discontinued      5,000 Units SC Every 8 Hours Scheduled 09/28/21 1239    09/15/21 0440  heparin (porcine) 5000 UNIT/ML injection 5,000 Units      5,000 Units IV Once 09/15/21 0621    09/15/21 0440  heparin 78067 units/250 mL (100 units/mL) in 0.45 % NaCl infusion  8.73 mL/hr,   Status:  Discontinued      10 Units/kg/hr IV Titrated 09/23/21 1512    09/15/21 0440  heparin (porcine) 5000 UNIT/ML injection 5,000 Units  Status:  Discontinued      5,000 Units IV As Needed  09/23/21 1512    09/15/21 0440  heparin (porcine) 5000 UNIT/ML injection 2,500 Units  Status:  Discontinued      2,500 Units IV As Needed 09/23/21 1512              Kieran Rene MD  [facility]  10/01/21  11:57 EDT

## 2021-10-01 NOTE — PROGRESS NOTES
Pharmacokinetics Service Note:    Based on Mr. Mckenna's vancomycin random level of 26.4 mg/L 9/30 AM and hemodialysis this afternoon, will redose with 1 gm post dialysis to maintain adequate levels.  Will repeat a random level Sunday AM to guide further dosing.      Thank you.  Flakita Sprague, Pharm.D.  10/1/2021  17:11 EDT

## 2021-10-01 NOTE — PROGRESS NOTES
"Palliative Care Daily Progress Note     S: Medical record reviewed, followed up with Primary RN Lacey and Dr. Rene regarding patient's condition. When Palliative viewed pt through CCU window, he was sedated on ventilator and did not appear to be in any distress.      O:   Palliative Performance Scale Score:     BP 97/68   Pulse 63   Temp 98.9 °F (37.2 °C) (Oral)   Resp (!) 29   Ht 175.3 cm (69.02\")   Wt 106 kg (234 lb 3.2 oz)   SpO2 95%   BMI 34.57 kg/m²     Intake/Output Summary (Last 24 hours) at 10/1/2021 1638  Last data filed at 10/1/2021 0614  Gross per 24 hour   Intake 3527.58 ml   Output 475 ml   Net 3052.58 ml       PE:  COVID RESTRICTIONS      Meds: Reviewed and changes noted.    Labs:   Results from last 7 days   Lab Units 10/01/21  0432   WBC 10*3/mm3 13.52*   HEMOGLOBIN g/dL 8.4*   HEMATOCRIT % 25.1*   PLATELETS 10*3/mm3 139*     Results from last 7 days   Lab Units 10/01/21  0432   SODIUM mmol/L 127*   POTASSIUM mmol/L 4.0   CHLORIDE mmol/L 88*   CO2 mmol/L 20.4*   BUN mg/dL 61*   CREATININE mg/dL 3.70*   GLUCOSE mg/dL 153*   CALCIUM mg/dL 7.8*     Results from last 7 days   Lab Units 10/01/21  0432   SODIUM mmol/L 127*   POTASSIUM mmol/L 4.0   CHLORIDE mmol/L 88*   CO2 mmol/L 20.4*   BUN mg/dL 61*   CREATININE mg/dL 3.70*   CALCIUM mg/dL 7.8*   BILIRUBIN mg/dL 0.3   ALK PHOS U/L 92   ALT (SGPT) U/L 6   AST (SGOT) U/L 9   GLUCOSE mg/dL 153*     Imaging Results (Last 72 Hours)     Procedure Component Value Units Date/Time    XR Chest 1 View [[id number]] Collected: 10/01/21 0800     Updated: 10/01/21 0803    Narrative:      XR CHEST 1 VW-     CLINICAL INDICATION: vent mgmt; E13.11-Other specified diabetes mellitus  with ketoacidosis with coma; J96.01-Acute respiratory failure with  hypoxia; U07.1-COVID-19; N17.9-Acute kidney failure, unspecified;  K85.90-Acute pancreatitis without necrosis or infection, unspecified;  K29.90-Gastroduodenitis, unspecified, without bleeding;  J15.9-Unspecified " bacterial pneumonia; E83.39-Other disorders of  phosphorus metabolism;         COMPARISON: 09/30/2021      TECHNIQUE: Single frontal view of the chest.     FINDINGS:     Trace left effusion and bilateral consolidation  No interval line change  There is no evidence of an acute osseous abnormality.   There are no suspicious-appearing parenchymal soft tissue nodules.          Impression:      Very little interval change since the previous exam     This report was finalized on 10/1/2021 8:00 AM by Dr. Jc Tovar MD.       XR Chest 1 View [[id number]] Collected: 09/30/21 0838     Updated: 09/30/21 0841    Narrative:      XR CHEST 1 VW-     CLINICAL INDICATION: vent mgmt; E13.11-Other specified diabetes mellitus  with ketoacidosis with coma; J96.01-Acute respiratory failure with  hypoxia; U07.1-COVID-19; N17.9-Acute kidney failure, unspecified;  K85.90-Acute pancreatitis without necrosis or infection, unspecified;  K29.90-Gastroduodenitis, unspecified, without bleeding;  J15.9-Unspecified bacterial pneumonia; E83.39-Other disorders of  phosphorus metabolism;         COMPARISON: 09/29/2021      TECHNIQUE: Single frontal view of the chest.     FINDINGS:     Bilateral consolidation  No interval line change  There is no evidence of an acute osseous abnormality.   There are no suspicious-appearing parenchymal soft tissue nodules.          Impression:      Bilateral consolidation     This report was finalized on 9/30/2021 8:39 AM by Dr. Jc Tovar MD.       XR Chest 1 View [[id number]] Collected: 09/29/21 1909     Updated: 09/29/21 1916    Narrative:      XR CHEST 1 VW-     CLINICAL INDICATION: vent mgmt; E13.11-Other specified diabetes mellitus  with ketoacidosis with coma; J96.01-Acute respiratory failure with  hypoxia; U07.1-COVID-19; N17.9-Acute kidney failure, unspecified;  K85.90-Acute pancreatitis without necrosis or infection, unspecified;  K29.90-Gastroduodenitis, unspecified, without bleeding;  J15.9-Unspecified  bacterial pneumonia; E83.39-Other disorders of  phosphorus metabolism;         COMPARISON: 09/28/2021      TECHNIQUE: Single frontal view of the chest.     FINDINGS:     Patchy bilateral airspace disease, slightly worse than on the previous  exam  No interval line change is seen  There is no evidence of an acute osseous abnormality.   There are no suspicious-appearing parenchymal soft tissue nodules.          Impression:      Lungs appear slightly worse than on the prior study     This report was finalized on 9/29/2021 7:09 PM by Dr. Jc Tovar MD.       US Venous Doppler Lower Extremity Bilateral (duplex) [[id number]] Collected: 09/29/21 1334     Updated: 09/29/21 1336    Narrative:      US VENOUS DOPPLER LOWER EXTREMITY BILATERAL (DUPLEX)-     CLINICAL INDICATION: d-dimer > 20 again, covid +; E13.11-Other specified  diabetes mellitus with ketoacidosis with coma; J96.01-Acute respiratory  failure with hypoxia; U07.1-COVID-19; N17.9-Acute kidney failure,  unspecified; K85.90-Acute pancreatitis without necrosis or infection,  unspecified; K29.90-Gastroduodenitis, unspecified, without bleeding;  J15.9-Unspecified bacterial pneumonia; E83.39-Other disorders of phosp        COMPARISON: 09/25/2021      TECHNIQUE: Color Doppler imaging was used with compression and  augmentation to evaluate the lower extremity deep venous system.     FINDINGS:   There is patent spontaneous flow from the common femoral vein through  the posterior tibial veins.  There was no internal clot or area of noncompressibility.  Normal augmentation was elicited where applicable.       Impression:      No DVT in the lower extremities on today's exam.      This report was finalized on 9/29/2021 1:34 PM by Dr. Jc Tovar MD.               Diagnostics: Reviewed    A: Martinez Mckenna is a 51 y.o. yo male in a unresponsive state. He has a past medical history of diabetes who presented via EMS unresponsive from home. Per EMS report obtained from  "patient's father, patient was diagnosed with COVID-19 pneumonia 1 week ago and has been unresponsive essentially since that time. Glucose reading was \"high\" in the field. Upon arrival to the ED his GCS was 3; he was unresponsive to painful stimuli. He had unobtainable BP but a palpable femoral pulse. He was critically hypotensive. He exhibited rapid shallow breathing. ED workup revealed DKA, hyperkalemia, JACKY, metabolic acidosis, and leukocytosis. Troponin was negative x2, while BNP was mildly elevated but this could be falsely high from renal failure. Lipase and amylase were also significantly elevated. CT imaging showed COVID-19 pneumonia with superimposed bacterial pneumonia, along with probable changes of pancreatitis, and inflammation surrounding pancreas. Patient is currently on precedex and fentanyl for sedation, along with IV insulin drip for DKA. Antibiotics have been added to cover for superimposed bacterial pneumonia mentioned on CT imaging. She has been admitted to the CCU fof further management.       P:  I was able to speak with patients sister Cari with father present as well. I updated them on Martinez;s condition and let them know he was still critically ill. I also let them know that he was at 70% Fio2 on his vent today. Cari asked if the doctors felt there was anything else they could do. I let them know if they felt there was nothing they could do they would communicate that to me as well as to them. I told her right now we are taking on e day at a time.  We discussed his vital signs and his la results as well as let them know he is still on BP support.I let her know that the hospital would communicate with them if there were any changes She was thankful for the call and I let her know that I would continue to touch base with them..      We will continue to follow along. Please do not hesitate to contact us regarding further sx mgmt or GOC needs, including after hours or on weekends via our on " call provider at [phone].     Dora Fleming, APRN    10/1/2021

## 2021-10-01 NOTE — PROGRESS NOTES
"  Daily Progress Note.   [facility] CRITICAL CARE  10/1/2021    Patient:  Name:  Martinez Mckenna  MRN:  [id number]  1970  51 y.o.  male         CC: vent mgmt covid ards dka renal failure, cavitary lung lesion    Interval History:  Doing well synchronous  No acute events  On HD  Still sig oxygen requirements    Physical Exam:  /81   Pulse 70   Temp 98.9 °F (37.2 °C) (Oral)   Resp (!) 29   Ht 175.3 cm (69.02\")   Wt 106 kg (234 lb 3.2 oz)   SpO2 93%   BMI 34.57 kg/m²   Body mass index is 34.57 kg/m².    Intake/Output Summary (Last 24 hours) at 10/1/2021 1334  Last data filed at 10/1/2021 0614  Gross per 24 hour   Intake 3627.58 ml   Output 475 ml   Net 3152.58 ml   Vent Settings          Resp Rate (Set): 29     FiO2 (%): 80 %  PEEP/CPAP (cm H2O): 12 cm H20    Minute Ventilation (L/min) (Obs): 12.9 L/min  Resp Rate (Observed) Vent: 29  I:E Ratio (Set): 1:0.74  I:E Ratio (Obs): 1:1.60    PIP Observed (cm H2O): 31 cm H2O       General appearance chronically ill appearing  Eyes: anicteric sclerae, moist conjunctivae;   HENT: Atraumatic; oropharynx +ET tube  Neck: Trachea midline; right HD cath, triple lumen   Lungs: mech breath sound bilaterally  synchronous respiratory effort   CV: RRR, no rub  Abdomen: Soft, non rigid bs+  Extremities: mild ble peripheral edema   Skin: wwp no diffuse rash  Psych/neuro: sedated paralyzed    Data Review:  Notable Labs:  Results from last 7 days   Lab Units 10/01/21  0432 09/30/21  0539 09/29/21  0408 09/28/21  0213 09/27/21  0110 09/26/21  0340 09/25/21  0206   WBC 10*3/mm3 13.52* 9.13 12.00* 15.20* 14.50* 16.16* 18.06*   HEMOGLOBIN g/dL 8.4* 8.0* 8.4* 9.3* 8.9* 9.4* 9.6*   PLATELETS 10*3/mm3 139* 120* 143 160 141 145 146     Results from last 7 days   Lab Units 10/01/21  0432 09/30/21  0539 09/29/21  0408 09/28/21  0213 09/27/21  0110 09/26/21  0340 09/25/21  0206   SODIUM mmol/L 127* 128* 132* 133* 132* 132* 135*   POTASSIUM mmol/L 4.0 3.9 4.7 4.0 4.0 3.6 3.4* "   CHLORIDE mmol/L 88* 88* 93* 93* 93* 91* 94*   CO2 mmol/L 20.4* 21.3* 21.4* 25.2 17.3* 21.0* 23.3   BUN mg/dL 61* 51* 63* 46* 73* 60* 49*   CREATININE mg/dL 3.70* 3.31* 4.59* 3.92* 5.11* 4.51* 3.42*   GLUCOSE mg/dL 153* 146* 44* 115* 85 139* 81   CALCIUM mg/dL 7.8* 7.2* 7.2* 7.1* 6.7* 6.4* 6.7*   MAGNESIUM mg/dL  --   --   --   --   --   --  2.3   PHOSPHORUS mg/dL  --   --   --   --   --   --  7.7*   Estimated Creatinine Clearance: 29 mL/min (A) (by C-G formula based on SCr of 3.7 mg/dL (H)).    Results from last 7 days   Lab Units 10/01/21  0432 09/30/21  0539 09/29/21  0408 09/28/21  0213 09/27/21  0110   LDH U/L 291*  --  357*  --  564*   AST (SGOT) U/L 9 10 14   < > 17   ALT (SGPT) U/L 6 7 7   < > 9   PROCALCITONIN ng/mL 1.40*  --  1.61*  --  2.26*   FERRITIN ng/mL 2,438.00*  --  2,501.00*  --  2,887.00*   D DIMER QUANT MCGFEU/mL 17.05*  --  >20.00*  --  >20.00*   CRP mg/dL 10.11* 10.58* 9.31*   < > 23.64*   PLATELETS 10*3/mm3 139* 120* 143   < > 141    < > = values in this interval not displayed.       Results from last 7 days   Lab Units 10/01/21  1203 10/01/21  0433 09/30/21  0420 09/29/21  0540 09/28/21  1732 09/28/21  1027 09/28/21  0822 09/28/21  0439 09/27/21  0427 09/26/21  0426 09/25/21  0957   PH, ARTERIAL pH units 7.361 7.416 7.443 7.294* 7.300* 7.217* 7.035* 7.136* 7.373 7.411 7.427   PCO2, ARTERIAL mm Hg 40.2 35.4 36.9 50.7* 50.8* 62.1* >104.0* 81.7* 34.9* 33.5* 40.1   PO2 ART mm Hg 69.8* 101.0 77.9* 85.3 105.0 81.1* 85.6 189.0* 84.2 76.2* 58.0*   HCO3 ART mmol/L 22.7 22.7 25.3 24.6 24.9 25.2 27.8* 27.6* 20.3 21.3 26.4*       Imaging:  Reviewed chest images personally from past 3 days    Scheduled meds:    artificial tears, , Both Eyes, Q4H  chlorhexidine, 15 mL, Mouth/Throat, Q12H  dexamethasone, 6 mg, Intravenous, Q24H  insulin aspart, 0-14 Units, Subcutaneous, Q6H  insulin detemir, 7 Units, Subcutaneous, Q12H  meropenem, 500 mg, Intravenous, Q24H  nystatin, , Topical, Q12H  pantoprazole, 40 mg,  Intravenous, Q12H  polyethylene glycol, 17 g, Oral, Daily  senna-docusate sodium, 1 tablet, Oral, BID  sodium bicarbonate, 100 mEq, Intravenous, Once  sodium bicarbonate, 1,300 mg, Oral, TID  sodium chloride, 1,000 mL, Intravenous, Once in Dialysis  sodium chloride, 10 mL, Intravenous, Q12H  sodium chloride, 10 mL, Intravenous, Q12H  sodium chloride, 10 mL, Intravenous, Q12H  sodium chloride, 10 mL, Intravenous, Q12H  Vancomycin Pharmacy Intermittent Dosing, , Does not apply, Daily  voriconazole, 200 mg, Per G Tube, Q12H        ASSESSMENT  /  PLAN:  1. Acute hypoxic and hypercapnic respiratory failure, on MV 9/14  2. COVID-19 pneumonia  3. Severe ARDS, PF ratio 97  4. RSV URTI  5. Hypotension, secondary sedation  6. Bacterial pneumonia (elevated procalcitonin) posterior consolidations  7. Severe Metabolic acidosis  8. Acute diabetic ketoacidosis, resolved  9. Acute renal failure  10. Acute pancreatitis, improved  11. Metabolic acidosis, secondary to renal failure  12. Poorly controlled DM - Hba1c - 11%  13. Candida albicans in sputum, probable contamination/colonization  14. Proteinuria  15. Propofol induced hypertriglyceridemia, mild  16. Anemia  17. Elevated d dimer -elevated - can certainly be seen in covid ards, neg studies so far, on heparin  18. Cavitary lung lesion       Weaning oxygenation fio2 dropped   Cavitary lung lesion - new compared to 9-14 making malignancy much less likely.  ET suction cultures sent. This will need outpt follow up imaging in 6-8 weeks and suggest abx for 3 weeks. Await fungal culture results  Cont decadron 6 daily and wean off given above.    D/w RN.    Hardy Wing MD  [facility]  10/01/21  1402 EDT

## 2021-10-01 NOTE — PROGRESS NOTES
THC Physician - Brief Progress Note  PERMANENT  10/01/2021 07:58    [facility] [place] [facility] [place] - CCU - [address] - [place], [place] ([facility])    DELROY SANCHEZ    Date of Service 10/01/2021 07:58    HPI/Events of Note RN noticed rhythm change; Showing SR with T wave abnormalities. RN to fax EKG over    Reviewed EKG non specific ST-T changes seen - ordered troponin and cardiology eval - patient is sedated and intubated and cannot assess chest pain - May also need an echo      Interventions Major-Respiratory failure - evaluation and management  Intermediate-Communication with other healthcare providers and/or family        Electronically Signed by: Mindi Chamberlain) on 10/01/2021 08:02

## 2021-10-01 NOTE — PROGRESS NOTES
Nephrology Progress Note      Subjective     Remains intubated on MV    Objective       Vital signs :     Temp:  [97.6 °F (36.4 °C)-98.6 °F (37 °C)] 98.6 °F (37 °C)  Heart Rate:  [56-91] 82  Resp:  [29] 29  BP: ()/(57-85) 109/63  FiO2 (%):  [80 %] 80 %      Intake/Output Summary (Last 24 hours) at 10/1/2021 0805  Last data filed at 10/1/2021 0614  Gross per 24 hour   Intake 3627.58 ml   Output 475 ml   Net 3152.58 ml       Physical Exam:    General: intubated on MV  Heart: Tele reveals sinus rhythm.   Lungs: Respirations appear to be regular, even and unlabored with no signs of respiratory distress. No audible wheezing.    Abdomen: no distension  Extremities: trace  Skin: No visible bleeding, bruising, or rash.  Neurologic: sedated        Laboratory Data :     Albumin Albumin   Date Value Ref Range Status   10/01/2021 2.28 (L) 3.50 - 5.20 g/dL Final   09/30/2021 2.30 (L) 3.50 - 5.20 g/dL Final   09/29/2021 2.28 (L) 3.50 - 5.20 g/dL Final      Magnesium No results found for: MG       PTH               No results found for: PTH    CBC and coagulation:  Results from last 7 days   Lab Units 10/01/21  0432 09/30/21  0539 09/29/21  0408 09/28/21  1428 09/28/21  0213 09/27/21  0110 09/26/21  0340 09/25/21  0207 09/25/21  0206   PROCALCITONIN ng/mL  --   --  1.61*  --   --  2.26*  --  1.37*  --    CRP mg/dL 10.11* 10.58* 9.31*  --    < > 23.64*   < >  --  9.04*   WBC 10*3/mm3 13.52* 9.13 12.00*  --    < > 14.50*   < >  --  18.06*   HEMOGLOBIN g/dL 8.4* 8.0* 8.4*  --    < > 8.9*   < >  --  9.6*   HEMATOCRIT % 25.1* 23.7* 26.4*  --    < > 26.5*   < >  --  27.7*   MCV fL 93.0 91.9 96.4  --    < > 91.4   < >  --  89.6   MCHC g/dL 33.5 33.8 31.8  --    < > 33.6   < >  --  34.7   PLATELETS 10*3/mm3 139* 120* 143  --    < > 141   < >  --  146   INR   --   --   --  0.91  --   --   --   --   --    D DIMER QUANT MCGFEU/mL 17.05*  --  >20.00*  --   --  >20.00*  --   --  19.03*    < > = values in this interval not displayed.      Acid/base balance:  Results from last 7 days   Lab Units 10/01/21  0433 09/30/21  0420 09/29/21  0540   PH, ARTERIAL pH units 7.416 7.443 7.294*   PO2 ART mm Hg 101.0 77.9* 85.3   PCO2, ARTERIAL mm Hg 35.4 36.9 50.7*   HCO3 ART mmol/L 22.7 25.3 24.6     Renal and electrolytes:  Results from last 7 days   Lab Units 10/01/21  0432 09/30/21  0539 09/29/21  0408 09/28/21  0213 09/28/21  0213 09/27/21  0110 09/27/21  0110 09/26/21  0340 09/25/21  0206   SODIUM mmol/L 127* 128* 132*  --  133*  --  132*   < > 135*   POTASSIUM mmol/L 4.0 3.9 4.7   < > 4.0   < > 4.0   < > 3.4*   MAGNESIUM mg/dL  --   --   --   --   --   --   --   --  2.3   CHLORIDE mmol/L 88* 88* 93*   < > 93*   < > 93*   < > 94*   CO2 mmol/L 20.4* 21.3* 21.4*   < > 25.2   < > 17.3*   < > 23.3   BUN mg/dL 61* 51* 63*   < > 46*   < > 73*   < > 49*   CREATININE mg/dL 3.70* 3.31* 4.59*  --  3.92*  --  5.11*   < > 3.42*   EGFR IF NONAFRICN AM mL/min/1.73 18* 20* 14*   < > 16*   < > 12*   < > 19*   CALCIUM mg/dL 7.8* 7.2* 7.2*   < > 7.1*   < > 6.7*   < > 6.7*   PHOSPHORUS mg/dL  --   --   --   --   --   --   --   --  7.7*    < > = values in this interval not displayed.     Estimated Creatinine Clearance: 29 mL/min (A) (by C-G formula based on SCr of 3.7 mg/dL (H)).    Liver and pancreatic function:  Results from last 7 days   Lab Units 10/01/21  0432 09/30/21  0539 09/29/21  0408   ALBUMIN g/dL 2.28* 2.30* 2.28*   BILIRUBIN mg/dL 0.3 0.4 0.4   ALK PHOS U/L 92 93 97   AST (SGOT) U/L 9 10 14   ALT (SGPT) U/L 6 7 7         Cardiac:      Liver and pancreatic function:  Results from last 7 days   Lab Units 10/01/21  0432 09/30/21  0539 09/29/21  0408   ALBUMIN g/dL 2.28* 2.30* 2.28*   BILIRUBIN mg/dL 0.3 0.4 0.4   ALK PHOS U/L 92 93 97   AST (SGOT) U/L 9 10 14   ALT (SGPT) U/L 6 7 7       Medications :     artificial tears, , Both Eyes, Q4H  chlorhexidine, 15 mL, Mouth/Throat, Q12H  dexamethasone, 6 mg, Intravenous, Q24H  insulin aspart, 0-14 Units, Subcutaneous,  Q6H  insulin detemir, 7 Units, Subcutaneous, Q12H  nystatin, , Topical, Q12H  pantoprazole, 40 mg, Intravenous, Q12H  polyethylene glycol, 17 g, Oral, Daily  senna-docusate sodium, 1 tablet, Oral, BID  sodium bicarbonate, 100 mEq, Intravenous, Once  sodium bicarbonate, 1,300 mg, Oral, TID  sodium chloride, 10 mL, Intravenous, Q12H  sodium chloride, 10 mL, Intravenous, Q12H  sodium chloride, 10 mL, Intravenous, Q12H  sodium chloride, 10 mL, Intravenous, Q12H  Vancomycin Pharmacy Intermittent Dosing, , Does not apply, Daily  voriconazole, 200 mg, Per G Tube, Q12H      dexmedetomidine, 0.2-1.5 mcg/kg/hr, Last Rate: 1.5 mcg/kg/hr (10/01/21 0605)  fentaNYL Citrate,   heparin (porcine), 9.8 Units/kg/hr, Last Rate: 13.8 Units/kg/hr (09/30/21 2234)  Midazolam 1 mg/mL 100mL NS, 1-10 mg/hr, Last Rate: 10 mg/hr (10/01/21 0346)  norepinephrine, 0.02-0.3 mcg/kg/min, Last Rate: 0.02 mcg/kg/min (09/30/21 0947)  propofol, 5-50 mcg/kg/min, Last Rate: 50 mcg/kg/min (10/01/21 0618)  vecuronium (NORCURON) infusion, 0.6-1.2 mcg/kg/min, Last Rate: 0.6 mcg/kg/min (10/01/21 0605)        Assessment/Plan     1. JACKY, oliguric started on dialysis on 9/20/21  3. Anion gap metabolic acidosis  4.  Proteinuria with hypocomplementemia but negative ANCA and negative YUDELKA  5. Hyponatremia mild likely hypervolumic  6.  Covid pneumonia  7.  ARDS    Dialysis today and continue as needed.   No indication of renal recovery    JACKY likely due to ATN  Baseline Cr 0.7, admitted with 2.8  [place] urine, no obstruction on renal USG  -strict I/O  -avoid nephrotoxic agents, and adjust medications according to eGFR    Sb Sullivan MD  10/01/21  08:04 EDT

## 2021-10-02 NOTE — PROGRESS NOTES
[facility]         Dr Burrell Sayied   LOS: 17 days   Patient Care Team:  Rafal Casillas MD as PCP - General (Family Medicine)    Chief Complaint: Vent management with COVID-19 pneumonia ARDS DKA renal failure cavitary lung lesion    Interval History: Events noted chart reviewed.  Currently on fentanyl propofol Versed Precedex and Nimbex drip.  FiO2 75% PEEP of 12.    REVIEW OF SYSTEMS:   Sedated intubated paralyzed    Ventilator/Non-Invasive Ventilation Settings (From admission, onward)     Start     Ordered    10/01/21 0729  Ventilator - AC/VC; (29); 50; 90%; 12; 440  Continuous     Comments: Changes made by MD   Question Answer Comment   Vent Mode AC/VC    Breath rate  29   FiO2 50    FiO2 titrate for Sp02% =/> 90%    PEEP 12    Tidal Volume 440        10/01/21 0728    09/29/21 1313  Ventilator - AC/VC; (29); 50; 90%; 12; 460  Continuous,   Status:  Canceled     Comments: Changes made by MD   Question Answer Comment   Vent Mode AC/VC    Breath rate  29   FiO2 50    FiO2 titrate for Sp02% =/> 90%    PEEP 12    Tidal Volume 460        09/29/21 1312    09/28/21 1749  Ventilator - AC/VC; (30); 80; 90%; 12; 440  Continuous,   Status:  Canceled     Comments: Changes made by MD   Question Answer Comment   Vent Mode AC/VC    Breath rate  30   FiO2 80    FiO2 titrate for Sp02% =/> 90%    PEEP 12    Tidal Volume 440        09/28/21 1750    09/28/21 0639  Ventilator - AC/PC; (28); 70; 90%; Other (see comments); 14; 15  Continuous,   Status:  Canceled     Question Answer Comment   Vent Mode AC/PC    Breath rate  28   FiO2 70    FiO2 titrate for Sp02% =/> 90%    PEEP Other (see comments)    PEEP: 14    Inspiratory Pressure 15        09/28/21 0639    09/21/21 1347  Ventilator - AC/PC; (24); 60; 90%; 12; 15  Continuous,   Status:  Canceled     Question Answer Comment   Vent Mode AC/PC    Breath rate  24   FiO2 60    FiO2 titrate for Sp02% =/> 90%    PEEP 12    Inspiratory Pressure 15        09/21/21 1346     09/18/21 1233  Ventilator - AC/PC; (14); 60; 90%; 10; 15  Continuous,   Status:  Canceled     Comments: Increase peep 10   Question Answer Comment   Vent Mode AC/PC    Breath rate  14   FiO2 60    FiO2 titrate for Sp02% =/> 90%    PEEP 10    Inspiratory Pressure 15        09/18/21 1233    09/18/21 0924  Ventilator - AC/PC; (14); 60; 90%; 8; 15  Continuous,   Status:  Canceled     Comments: Increase to 60% and decrease rate to 14, abg at 1200   Question Answer Comment   Vent Mode AC/PC    Breath rate  14   FiO2 60    FiO2 titrate for Sp02% =/> 90%    PEEP 8    Inspiratory Pressure 15        09/18/21 0923    09/18/21 0922  Ventilator - AC/PC; (4); 60; 90%; 8; 15  Continuous,   Status:  Canceled     Comments: Increase to 60% and decrease rate to 14, abg at 1200   Question Answer Comment   Vent Mode AC/PC    Breath rate  4   FiO2 60    FiO2 titrate for Sp02% =/> 90%    PEEP 8    Inspiratory Pressure 15        09/18/21 0922    09/16/21 1222  Ventilator - AC/PC; (18); 40; 8  Continuous,   Status:  Canceled     Question Answer Comment   Vent Mode AC/PC    Breath rate  18   FiO2 40    PEEP 8        09/16/21 1222    09/15/21 1652  Ventilator - AC/VC+; (28); 40; 8; 500  Continuous,   Status:  Canceled     Question Answer Comment   Vent Mode AC/VC+    Breath rate  28   FiO2 40    PEEP 8    Tidal Volume 500        09/15/21 1652    09/15/21 0834  Ventilator - AC/VC; (28); 90; 8; 500  Continuous,   Status:  Canceled     Question Answer Comment   Vent Mode AC/VC    Breath rate  28   FiO2 90    PEEP 8    Tidal Volume 500        09/15/21 0834                  Vital Signs  Temp:  [97.5 °F (36.4 °C)-99 °F (37.2 °C)] 97.6 °F (36.4 °C)  Heart Rate:  [53-80] 58  Resp:  [29-31] 29  BP: ()/(55-96) 105/70  FiO2 (%):  [70 %-75 %] 75 %    Intake/Output Summary (Last 24 hours) at 10/2/2021 1119  Last data filed at 10/2/2021 0609  Gross per 24 hour   Intake 3248.81 ml   Output 4550 ml   Net -1301.19 ml     Flowsheet Rows      First Filed  "Value   Admission Height  175.3 cm (69\") Documented at 09/14/2021 1053   Admission Weight  95.3 kg (210 lb) Documented at 09/14/2021 1053          Physical Exam:  Patient is examined using the personal protective equipment as per guidelines from infection control for this particular patient as enacted.  Hand hygiene was performed before and after patient interaction.   General Appearance:   Sedated intubated paralyzed  Neck midline trachea, no thyromegaly   Lungs:    Diminished breath sounds equal on the vent    Heart:    Regular rhythm and normal rate, normal S1 and S2, no            murmur, no gallop, no rub, no click   Chest Wall:    No abnormalities observed   Abdomen:     Normal bowel sounds, no masses, no organomegaly, soft        nontender, nondistended, no guarding, no rebound                tenderness   Extremities:  Trace to 1+ edema, no cyanosis, no             redness  CNS sedated intubated paralyzed  Skin no rashes no nodules  Musculoskeletal no cyanosis no clubbing      Results Review:        Results from last 7 days   Lab Units 10/02/21  0100 10/01/21  0432 10/01/21  0432 09/30/21  0539 09/30/21  0539   SODIUM mmol/L 122*  --  127*  --  128*   POTASSIUM mmol/L 4.0  --  4.0  --  3.9   CHLORIDE mmol/L 84*  --  88*  --  88*   CO2 mmol/L 20.7*  --  20.4*  --  21.3*   BUN mg/dL 42*  --  61*  --  51*   CREATININE mg/dL 2.56*  --  3.70*  --  3.31*   GLUCOSE mg/dL 251*   < > 153*   < > 146*   CALCIUM mg/dL 7.7*  --  7.8*  --  7.2*    < > = values in this interval not displayed.     Results from last 7 days   Lab Units 10/02/21  0749 10/01/21  0825   TROPONIN T ng/mL 0.056* 0.045*     Results from last 7 days   Lab Units 10/02/21  0100 10/01/21  0432 09/30/21  0539   WBC 10*3/mm3 10.58 13.52* 9.13   HEMOGLOBIN g/dL 8.9* 8.4* 8.0*   HEMATOCRIT % 26.7* 25.1* 23.7*   PLATELETS 10*3/mm3 130* 139* 120*     Results from last 7 days   Lab Units 10/02/21  0712 10/02/21  0100 10/01/21  1223 09/28/21 2002 " 09/28/21  1428   INR   --   --   --   --  0.91   APTT seconds 37.5* 74.4* 52.8*   < > 29.2    < > = values in this interval not displayed.                 Results from last 7 days   Lab Units 10/02/21  0947   PH, ARTERIAL pH units 7.431   PO2 ART mm Hg 64.4*   PCO2, ARTERIAL mm Hg 39.9   HCO3 ART mmol/L 26.5*       I reviewed the patient's new clinical results.  I personally viewed and interpreted the patient's chest x-ray.        Medication Review:   artificial tears, , Both Eyes, Q4H  aspirin, 81 mg, Oral, Daily  atorvastatin, 20 mg, Oral, Nightly  chlorhexidine, 15 mL, Mouth/Throat, Q12H  dexamethasone, 6 mg, Intravenous, Q24H  insulin aspart, 0-14 Units, Subcutaneous, Q6H  insulin detemir, 7 Units, Subcutaneous, Q12H  meropenem, 500 mg, Intravenous, Q24H  nystatin, , Topical, Q12H  pantoprazole, 40 mg, Intravenous, Q12H  polyethylene glycol, 17 g, Oral, Daily  senna-docusate sodium, 1 tablet, Oral, BID  sodium bicarbonate, 100 mEq, Intravenous, Once  sodium bicarbonate, 1,300 mg, Oral, TID  sodium chloride, 10 mL, Intravenous, Q12H  sodium chloride, 10 mL, Intravenous, Q12H  sodium chloride, 10 mL, Intravenous, Q12H  sodium chloride, 10 mL, Intravenous, Q12H  Vancomycin Pharmacy Intermittent Dosing, , Does not apply, Daily  voriconazole, 200 mg, Per G Tube, Q12H        dexmedetomidine, 0.2-1.5 mcg/kg/hr, Last Rate: 1.5 mcg/kg/hr (10/02/21 0615)  fentaNYL Citrate,   heparin (porcine), 9.8 Units/kg/hr, Last Rate: 19.8 Units/kg/hr (10/02/21 0950)  Midazolam 1 mg/mL 100mL NS, 1-10 mg/hr, Last Rate: 10 mg/hr (10/02/21 0432)  norepinephrine, 0.02-0.3 mcg/kg/min, Last Rate: Stopped (10/02/21 0420)  propofol, 5-50 mcg/kg/min, Last Rate: 50 mcg/kg/min (10/02/21 1034)  vecuronium (NORCURON) infusion, 0.6-1.2 mcg/kg/min, Last Rate: 0.6 mcg/kg/min (10/01/21 0605)        ASSESSMENT:   1. Acute hypoxic and hypercapnic respiratory failure, on MV 9/14  2. COVID-19 pneumonia  3. Severe ARDS, PF ratio 97  4. RSV  URTI  5. Hypotension, secondary sedation  6. Bacterial pneumonia (elevated procalcitonin) posterior consolidations  7. Severe Metabolic acidosis  8. Acute diabetic ketoacidosis, resolved  9. Acute renal failure  10. Acute pancreatitis, improved  11. Metabolic acidosis, secondary to renal failure  12. Poorly controlled DM - Hba1c - 11%  13. Candida albicans in sputum, probable contamination/colonization  14. Proteinuria  15. Propofol induced hypertriglyceridemia, mild  16. Anemia  17. Elevated d dimer -elevated - can certainly be seen in covid ards, neg studies so far, on heparin  18. Cavitary lung lesion     PLAN:  Remains heavily sedated and paralyzed.  Still requiring 75% FiO2 with a PEEP of 12.  Wean down FiO2 to keep sats above 90%.  ABGs reviewed this morning improving oxygenation noted.  Once FiO2 60% or less we will try weaning off Nimbex drip first.  Cavitary lung lesion will need outpatient follow-up with repeat CT chest.  Antibiotics recommended for 3 weeks  Continue Decadron 6 mg daily and start weaning after day 10.  Discussed with nursing staff.      Indra Leblanc MD  10/02/21  11:19 EDT

## 2021-10-02 NOTE — PROGRESS NOTES
PROGRESS NOTE         Patient Identification:  Name:  Martinez Mckenna  Age:  51 y.o.  Sex:  male  :  1970  MRN:  [id number]  Visit Number:  [id number]  Primary Care Provider:  Rafal Casillas MD         LOS: 17 days       ----------------------------------------------------------------------------------------------------------------------  Subjective       Chief Complaints:    Respiratory Distress        Interval History:      Patient remains intubated and sedated at increased FiO2 of 75% today.  Afebrile.  WBC normal.  CRP stable at 10.92.  Chest x-ray from 10/2/2021 reports little overall change.    Review of Systems:    Unable to obtain.  Intubated and sedated.  ----------------------------------------------------------------------------------------------------------------------      Objective       Current Hospital Meds:  artificial tears, , Both Eyes, Q4H  aspirin, 81 mg, Oral, Daily  atorvastatin, 20 mg, Oral, Nightly  chlorhexidine, 15 mL, Mouth/Throat, Q12H  dexamethasone, 6 mg, Intravenous, Q24H  insulin aspart, 0-14 Units, Subcutaneous, Q6H  insulin detemir, 7 Units, Subcutaneous, Q12H  meropenem, 500 mg, Intravenous, Q24H  nystatin, , Topical, Q12H  pantoprazole, 40 mg, Intravenous, Q12H  polyethylene glycol, 17 g, Oral, Daily  senna-docusate sodium, 1 tablet, Oral, BID  sodium bicarbonate, 100 mEq, Intravenous, Once  sodium bicarbonate, 1,300 mg, Oral, TID  sodium chloride, 10 mL, Intravenous, Q12H  sodium chloride, 10 mL, Intravenous, Q12H  sodium chloride, 10 mL, Intravenous, Q12H  sodium chloride, 10 mL, Intravenous, Q12H  Vancomycin Pharmacy Intermittent Dosing, , Does not apply, Daily  voriconazole, 200 mg, Per G Tube, Q12H      dexmedetomidine, 0.2-1.5 mcg/kg/hr, Last Rate: 1.5 mcg/kg/hr (10/02/21 0615)  fentaNYL Citrate,   heparin (porcine), 9.8 Units/kg/hr, Last Rate: 19.8 Units/kg/hr (10/02/21 0950)  Midazolam 1 mg/mL 100mL NS, 1-10 mg/hr, Last Rate: 10 mg/hr (10/02/21  5978)  norepinephrine, 0.02-0.3 mcg/kg/min, Last Rate: Stopped (10/02/21 0420)  propofol, 5-50 mcg/kg/min, Last Rate: 50 mcg/kg/min (10/02/21 1317)  vecuronium (NORCURON) infusion, 0.6-1.2 mcg/kg/min, Last Rate: 0.6 mcg/kg/min (10/01/21 0605)      ----------------------------------------------------------------------------------------------------------------------    Vital Signs:  Temp:  [97.5 °F (36.4 °C)-99 °F (37.2 °C)] 97.6 °F (36.4 °C)  Heart Rate:  [53-80] 58  Resp:  [29-31] 29  BP: ()/(55-96) 105/70  FiO2 (%):  [70 %-75 %] 75 %  Mean Arterial Pressure (Non-Invasive) for the past 24 hrs (Last 3 readings):   Noninvasive MAP (mmHg)   10/02/21 1000 81   10/02/21 0805 83   10/02/21 0648 78     SpO2 Percentage    10/02/21 0841 10/02/21 1000 10/02/21 1029   SpO2: 90% 91% 91%     SpO2:  [88 %-96 %] 91 %  on   ;   Device (Oxygen Therapy): ventilator    Body mass index is 33.74 kg/m².  Wt Readings from Last 3 Encounters:   10/02/21 104 kg (228 lb 9.6 oz)   08/04/21 95.3 kg (210 lb)        Intake/Output Summary (Last 24 hours) at 10/2/2021 1322  Last data filed at 10/2/2021 1321  Gross per 24 hour   Intake 3348.81 ml   Output 4550 ml   Net -1201.19 ml     NPO Diet  ----------------------------------------------------------------------------------------------------------------------      Physical Exam:    Deferred due to COVID-19 isolation.  ----------------------------------------------------------------------------------------------------------------------  Results from last 7 days   Lab Units 10/02/21  0749 10/01/21  0825   TROPONIN T ng/mL 0.056* 0.045*             Results from last 7 days   Lab Units 10/02/21  0947   PH, ARTERIAL pH units 7.431   PO2 ART mm Hg 64.4*   PCO2, ARTERIAL mm Hg 39.9   HCO3 ART mmol/L 26.5*     Results from last 7 days   Lab Units 10/02/21  0100 10/01/21  0432 09/30/21  0539 09/29/21  0408 09/28/21  1428   CRP mg/dL 10.92* 10.11* 10.58*   < >  --    WBC 10*3/mm3 10.58 13.52* 9.13   <  >  --    HEMOGLOBIN g/dL 8.9* 8.4* 8.0*   < >  --    HEMATOCRIT % 26.7* 25.1* 23.7*   < >  --    MCV fL 92.1 93.0 91.9   < >  --    MCHC g/dL 33.3 33.5 33.8   < >  --    PLATELETS 10*3/mm3 130* 139* 120*   < >  --    INR   --   --   --   --  0.91    < > = values in this interval not displayed.     Results from last 7 days   Lab Units 10/02/21  0100 10/01/21  0432 09/30/21  0539   SODIUM mmol/L 122* 127* 128*   POTASSIUM mmol/L 4.0 4.0 3.9   CHLORIDE mmol/L 84* 88* 88*   CO2 mmol/L 20.7* 20.4* 21.3*   BUN mg/dL 42* 61* 51*   CREATININE mg/dL 2.56* 3.70* 3.31*   EGFR IF NONAFRICN AM mL/min/1.73 27* 18* 20*   CALCIUM mg/dL 7.7* 7.8* 7.2*   GLUCOSE mg/dL 251* 153* 146*   ALBUMIN g/dL 2.85* 2.28* 2.30*   BILIRUBIN mg/dL 0.3 0.3 0.4   ALK PHOS U/L 136* 92 93   AST (SGOT) U/L 10 9 10   ALT (SGPT) U/L 6 6 7   Estimated Creatinine Clearance: 41.5 mL/min (A) (by C-G formula based on SCr of 2.56 mg/dL (H)).  No results found for: AMMONIA    Glucose   Date/Time Value Ref Range Status   10/02/2021 1104 202 (H) 70 - 130 mg/dL Final     Comment:     Meter: [id number] : [id number] Michelle Lacey   10/02/2021 0548 186 (H) 70 - 130 mg/dL Final     Comment:     Meter: [id number] : [id number] AUSTIN WHITE   10/02/2021 0035 266 (H) 70 - 130 mg/dL Final     Comment:     Meter: [id number] : [id number] AUSTIN WHITE   10/01/2021 2123 190 (H) 70 - 130 mg/dL Final     Comment:     Meter: [id number] : [id number] AUSTIN MOORECHRISTIN   10/01/2021 1803 174 (H) 70 - 130 mg/dL Final     Comment:     Meter: [id number] : [id number] Michelle Lacey   10/01/2021 1137 188 (H) 70 - 130 mg/dL Final     Comment:     Meter: [id number] : [id number] Michelle Lacey   10/01/2021 0527 170 (H) 70 - 130 mg/dL Final     Comment:     Meter: [id number] : [id number] AUSTIN CINDY   09/30/2021 2341 177 (H) 70 - 130 mg/dL Final     Comment:     Meter: [id number] : [id number] KRISTEN BROWNING     Lab Results   Component Value Date    HGBA1C 11.20 (H)  09/14/2021     Lab Results   Component Value Date    TSH 0.761 09/14/2021    FREET4 1.08 09/14/2021       Blood Culture   Date Value Ref Range Status   09/14/2021 No growth at 2 days  Preliminary   09/14/2021 No growth at 2 days  Preliminary     No results found for: URINECX  No results found for: WOUNDCX  No results found for: STOOLCX  No results found for: RESPCX  Pain Management Panel    There is no flowsheet data to display.           ----------------------------------------------------------------------------------------------------------------------  Imaging Results (Last 24 Hours)       Procedure Component Value Units Date/Time    XR Chest 1 View [[id number]] Collected: 10/02/21 0829     Updated: 10/02/21 0832    Narrative:      XR CHEST 1 VW-     CLINICAL INDICATION: intubated/ogt placement; E13.11-Other specified  diabetes mellitus with ketoacidosis with coma; J96.01-Acute respiratory  failure with hypoxia; U07.1-COVID-19; N17.9-Acute kidney failure,  unspecified; K85.90-Acute pancreatitis without necrosis or infection,  unspecified; K29.90-Gastroduodenitis, unspecified, without bleeding;  J15.9-Unspecified bacterial pneumonia; E83.39-Other disorders of  phosphorus        COMPARISON: 10/01/2021      TECHNIQUE: Single frontal view of the chest.     FINDINGS:     Left effusion and bilateral airspace disease  NG tube is in the stomach. Other lines are stable. The endotracheal tube  is 2 cm cephalad to the jono  There is no evidence of an acute osseous abnormality.   There are no suspicious-appearing parenchymal soft tissue nodules.          Impression:      Little overall change     This report was finalized on 10/2/2021 8:30 AM by Dr. Jc Tovar MD.               ----------------------------------------------------------------------------------------------------------------------    Assessment/Plan       Assessment/Plan     ASSESSMENT:    1.  Septic shock with lactic acid greater than 4 on admission  2.   Covid pneumonia  3.  Aspiration pneumonia    PLAN:    Patient remains intubated and sedated at increased FiO2 of 75% today.  Afebrile.  WBC normal.  CRP stable at 10.92.  Chest x-ray from 10/2/2021 reports little overall change.    Procalcitonin from 10/1/2021 improved at 1.40.  Fungal antibody panel negative.  QuantiFERON-TB gold is indeterminate.  Aspergillus galactomannan antigen positive at 1.88.      Respiratory culture from 9/28/2021 has finalized with light growth of yeast, not Candida albicans.      CT chest with PE protocol on 9/26/2021 shows markedly limited examination with no gross pulmonary emboli.  Pneumomediastinum.  Correlate with ventilation settings.  Significant bilateral groundglass infiltrates with lower lobe consolidation and new cavitary process with filling defect in the right upper lobe.  Correlate for superimposed opportunistic infection.  Marked abnormalities in the upper abdomen suspicious for pancreatitis.  Incidental fluid-filled colon loops that may be correlated clinically with diarrhea.  Moderate steatosis of the liver, correlate with LFTs.    Legionella screen negative.  Strep pneumo screen negative. Respiratory culture from 9/15/2021 finalized as light growth of Candida albicans. Respiratory panel from 9/15/2021 detected RSV.  Blood cultures from 9/14/2021 finalized as no growth.  MRSA screen was negative on 9/15/2021.    Recommend to continue current courses of Merrem, voriconazole, vancomycin.    In the setting of cavitary lesion and indeterminant QuantiFERON TB Gold there is strong indication for bronchoscopy with BAL for AFB.    Pulmonology note reviewed, in regard to cavitary lesion, recommendation to follow-up with outpatient imaging in 6 to 8 weeks and suggested antibiotic course x3 weeks.     We will continue to follow closely and adjust coverage as needed.    Code Status:   Code Status and Medical Interventions:   Ordered at: 09/28/21 1631     Limited Support to NOT  Include:    Cardioversion/Defibrillation     Level Of Support Discussed With:    Next of Kin (If No Surrogate)     Code Status:    No CPR     Medical Interventions (Level of Support Prior to Arrest):    Limited     Scribed for Dr. Chloe Garvey MD by ALYSE Mathis  10/02/21 13:25 EDT          ALYSE Mathis  10/02/21  13:22 EDT    Physician Attestation:    The documentation recorded by the scribe accurately reflects the service I personally performed and the decisions made by me.    Chloe Garvey MD  10/02/21  13:22 EDT

## 2021-10-02 NOTE — PROGRESS NOTES
Nephrology Progress Note      Subjective     No change in clinical status, remains intubated on MV    Objective       Vital signs :     Temp:  [97.5 °F (36.4 °C)-99.8 °F (37.7 °C)] 97.5 °F (36.4 °C)  Heart Rate:  [53-82] 62  Resp:  [29-31] 29  BP: ()/(55-96) 105/68  FiO2 (%):  [70 %-80 %] 70 %      Intake/Output Summary (Last 24 hours) at 10/2/2021 0755  Last data filed at 10/2/2021 0609  Gross per 24 hour   Intake 3248.81 ml   Output 4550 ml   Net -1301.19 ml       Physical Exam:    General: intubated on MV  Heart: Tele reveals sinus rhythm.   Lungs: Respirations appear to be regular, even and unlabored with no signs of respiratory distress. No audible wheezing.    Abdomen: no distension  Extremities: trace  Skin: No visible bleeding, bruising, or rash.  Neurologic: sedated        Laboratory Data :     Albumin Albumin   Date Value Ref Range Status   10/02/2021 2.85 (L) 3.50 - 5.20 g/dL Final   10/01/2021 2.28 (L) 3.50 - 5.20 g/dL Final   09/30/2021 2.30 (L) 3.50 - 5.20 g/dL Final      Magnesium No results found for: MG       PTH               No results found for: PTH    CBC and coagulation:  Results from last 7 days   Lab Units 10/02/21  0100 10/01/21  0432 09/30/21  0539 09/29/21  0408 09/29/21  0408 09/28/21  1428 09/28/21  0213 09/27/21  0110   PROCALCITONIN ng/mL  --  1.40*  --   --  1.61*  --   --  2.26*   CRP mg/dL 10.92* 10.11* 10.58*   < > 9.31*  --    < > 23.64*   WBC 10*3/mm3 10.58 13.52* 9.13   < > 12.00*  --    < > 14.50*   HEMOGLOBIN g/dL 8.9* 8.4* 8.0*   < > 8.4*  --    < > 8.9*   HEMATOCRIT % 26.7* 25.1* 23.7*   < > 26.4*  --    < > 26.5*   MCV fL 92.1 93.0 91.9   < > 96.4  --    < > 91.4   MCHC g/dL 33.3 33.5 33.8   < > 31.8  --    < > 33.6   PLATELETS 10*3/mm3 130* 139* 120*   < > 143  --    < > 141   INR   --   --   --   --   --  0.91  --   --    D DIMER QUANT MCGFEU/mL  --  17.05*  --   --  >20.00*  --   --  >20.00*    < > = values in this interval not displayed.     Acid/base  balance:  Results from last 7 days   Lab Units 10/01/21  1203 10/01/21  0433 09/30/21  0420   PH, ARTERIAL pH units 7.361 7.416 7.443   PO2 ART mm Hg 69.8* 101.0 77.9*   PCO2, ARTERIAL mm Hg 40.2 35.4 36.9   HCO3 ART mmol/L 22.7 22.7 25.3     Renal and electrolytes:  Results from last 7 days   Lab Units 10/02/21  0100 10/01/21  0432 09/30/21  0539 09/29/21  0408 09/29/21  0408 09/28/21  0213 09/28/21  0213   SODIUM mmol/L 122* 127* 128*  --  132*  --  133*   POTASSIUM mmol/L 4.0 4.0 3.9   < > 4.7   < > 4.0   CHLORIDE mmol/L 84* 88* 88*   < > 93*   < > 93*   CO2 mmol/L 20.7* 20.4* 21.3*   < > 21.4*   < > 25.2   BUN mg/dL 42* 61* 51*   < > 63*   < > 46*   CREATININE mg/dL 2.56* 3.70* 3.31*  --  4.59*  --  3.92*   EGFR IF NONAFRICN AM mL/min/1.73 27* 18* 20*   < > 14*   < > 16*   CALCIUM mg/dL 7.7* 7.8* 7.2*   < > 7.2*   < > 7.1*    < > = values in this interval not displayed.     Estimated Creatinine Clearance: 41.5 mL/min (A) (by C-G formula based on SCr of 2.56 mg/dL (H)).    Liver and pancreatic function:  Results from last 7 days   Lab Units 10/02/21  0100 10/01/21  0432 09/30/21  0539   ALBUMIN g/dL 2.85* 2.28* 2.30*   BILIRUBIN mg/dL 0.3 0.3 0.4   ALK PHOS U/L 136* 92 93   AST (SGOT) U/L 10 9 10   ALT (SGPT) U/L 6 6 7         Cardiac:      Liver and pancreatic function:  Results from last 7 days   Lab Units 10/02/21  0100 10/01/21  0432 09/30/21  0539   ALBUMIN g/dL 2.85* 2.28* 2.30*   BILIRUBIN mg/dL 0.3 0.3 0.4   ALK PHOS U/L 136* 92 93   AST (SGOT) U/L 10 9 10   ALT (SGPT) U/L 6 6 7       Medications :     artificial tears, , Both Eyes, Q4H  aspirin, 81 mg, Oral, Daily  atorvastatin, 20 mg, Oral, Nightly  chlorhexidine, 15 mL, Mouth/Throat, Q12H  dexamethasone, 6 mg, Intravenous, Q24H  insulin aspart, 0-14 Units, Subcutaneous, Q6H  insulin detemir, 7 Units, Subcutaneous, Q12H  meropenem, 500 mg, Intravenous, Q24H  nystatin, , Topical, Q12H  pantoprazole, 40 mg, Intravenous, Q12H  polyethylene glycol, 17 g,  Oral, Daily  senna-docusate sodium, 1 tablet, Oral, BID  sodium bicarbonate, 100 mEq, Intravenous, Once  sodium bicarbonate, 1,300 mg, Oral, TID  sodium chloride, 10 mL, Intravenous, Q12H  sodium chloride, 10 mL, Intravenous, Q12H  sodium chloride, 10 mL, Intravenous, Q12H  sodium chloride, 10 mL, Intravenous, Q12H  Vancomycin Pharmacy Intermittent Dosing, , Does not apply, Daily  voriconazole, 200 mg, Per G Tube, Q12H      dexmedetomidine, 0.2-1.5 mcg/kg/hr, Last Rate: 1.5 mcg/kg/hr (10/02/21 0615)  fentaNYL Citrate,   heparin (porcine), 9.8 Units/kg/hr, Last Rate: 15.8 Units/kg/hr (10/01/21 1658)  Midazolam 1 mg/mL 100mL NS, 1-10 mg/hr, Last Rate: 10 mg/hr (10/02/21 0432)  norepinephrine, 0.02-0.3 mcg/kg/min, Last Rate: Stopped (10/02/21 0420)  propofol, 5-50 mcg/kg/min, Last Rate: 50 mcg/kg/min (10/02/21 0431)  vecuronium (NORCURON) infusion, 0.6-1.2 mcg/kg/min, Last Rate: 0.6 mcg/kg/min (10/01/21 0605)        Assessment/Plan     1. JACKY, oliguric started on dialysis on 9/20/21  3. Anion gap metabolic acidosis  4.  Proteinuria with hypocomplementemia but negative ANCA and negative YUDELKA  5. Hyponatremia mild likely hypervolumic  6.  Covid pneumonia  7.  ARDS    Pt urine output is >500, relative better, will watch closely for holly recovery and continue supporting with dialysis as clinical status demands.     JACKY likely due to ATN  Baseline Cr 0.7, admitted with 2.8  [place] urine, no obstruction on renal USG  -strict I/O  -avoid nephrotoxic agents, and adjust medications according to eGFR    Sb Sullivan MD  10/02/21  07:55 EDT

## 2021-10-02 NOTE — PROGRESS NOTES
[facility] Cardiology  INPATIENT PROGRESS NOTE    Name: Martinez Mckenna  Age/Sex: 51 y.o. male  :  1970        PCP: Rafal Casillas MD    Assessment and plan  COVID-19 pneumonia with acute hypoxic hypercapnic respiratory failure  ARDS bacterial pneumonia   severe metabolic acidosis   acute diabetic ketoacidosis  acute renal failure  acute pancreatitis  Multiorgan failure  Candidemia  anemia   Supply demand mismatch type II MI  Abnormal EKG  Altered mental status admitted in an unresponsive state    Guarded prognosis in the severely compromised patient  Thank you for allowing me to participate in this patient care  Austin Shamai DO      Subjective no events no complaints      Vital Signs  Temp:  [97.5 °F (36.4 °C)-99 °F (37.2 °C)] 97.6 °F (36.4 °C)  Heart Rate:  [53-80] 58  Resp:  [29-31] 29  BP: ()/(55-96) 105/70  FiO2 (%):  [70 %-75 %] 75 %  Body mass index is 33.74 kg/m².    Due to COVID-19 physical exam has been deferred telemetry monitor reviewed      Patient Active Problem List   Diagnosis   • Diabetic acidosis (HCC)       Past Medical History:   Diagnosis Date   • Diabetes mellitus (CMS/HCC)        Current Facility-Administered Medications   Medication Dose Route Frequency Provider Last Rate Last Admin   • acetaminophen (TYLENOL) tablet 650 mg  650 mg Oral Q6H PRN Adama Brown MD   650 mg at 21 1031    Or   • acetaminophen (TYLENOL) suppository 650 mg  650 mg Rectal Q4H PRN Adama Brown MD       • albumin human 25 % IV SOLN 25 g  25 g Intravenous PRN Sb Sullivan MD   25 g at 10/01/21 1534   • albuterol sulfate HFA (PROVENTIL HFA;VENTOLIN HFA;PROAIR HFA) inhaler 2 puff  2 puff Inhalation Q6H PRN Kieran Rene MD       • artificial tears ophthalmic ointment   Both Eyes Q4H Sully Zhong MD   Given at 10/02/21 1110   • aspirin EC tablet 81 mg  81 mg Oral Daily Kieran Rene MD   81 mg at 10/02/21 1101   • atorvastatin (LIPITOR) tablet 20 mg  20 mg Oral  Nightly Kieran Rene MD       • calcium gluconate 1 g in sodium chloride 0.9 % 100 mL IVPB  1 g Intravenous PRN Kieran Rene MD        And   • calcium gluconate 6 g in sodium chloride 0.9 % 500 mL IVPB  6 g Intravenous PRN Kieran Rene MD       • chlorhexidine (PERIDEX) 0.12 % solution 15 mL  15 mL Mouth/Throat Q12H Kieran Rene MD   15 mL at 10/02/21 0841   • dexamethasone (DECADRON) injection 6 mg  6 mg Intravenous Q24H Hardy Wing MD   6 mg at 10/02/21 0549   • DEXMEDETOMIDINE 1000 MCG/250ML INFUSION infusion  0.2-1.5 mcg/kg/hr Intravenous Titrated Adama Brown MD 35.7 mL/hr at 10/02/21 0615 1.5 mcg/kg/hr at 10/02/21 0615   • dextrose (D50W) 25 g/ 50mL Intravenous Solution 25 g  25 g Intravenous Q15 Min PRN Kieran Rene MD   25 g at 09/29/21 0541   • dextrose (D50W) 25 g/ 50mL Intravenous Solution 25 g  25 g Intravenous Q15 Min PRN Sully Zhong MD   25 g at 09/29/21 0542   • dextrose (GLUTOSE) oral gel 15 g  15 g Oral Q15 Min PRN Kieran Rene MD       • dextrose (GLUTOSE) oral gel 15 g  15 g Oral Q15 Min PRN Sully Zhong MD       • fentaNYL (SUBLIMAZE) PCA 1500 mcg/30 mL syringe   Intravenous Titrated Kieran Rene MD   New Bag at 10/02/21 1058   • glucagon (human recombinant) (GLUCAGEN DIAGNOSTIC) injection 1 mg  1 mg Subcutaneous Q15 Min PRN Kieran Rene MD       • glucagon (human recombinant) (GLUCAGEN DIAGNOSTIC) injection 1 mg  1 mg Subcutaneous Q15 Min PRN Sully Zhong MD       • heparin (porcine) 5000 UNIT/ML injection 2,500 Units  2,500 Units Intravenous PRN Kieran Rene MD       • heparin (porcine) 5000 UNIT/ML injection 5,000 Units  5,000 Units Intravenous PRN Kieran Rene MD   5,000 Units at 09/28/21 2147   • heparin 70659 units/250 mL (100 units/mL) in 0.45 % NaCl infusion  9.8 Units/kg/hr Intravenous Titrated Kieran Rene MD 20.1 mL/hr at 10/02/21 0950 19.8 Units/kg/hr at 10/02/21 0950   • insulin aspart (novoLOG) injection  0-14 Units  0-14 Units Subcutaneous Q6H Kieran Rene MD   5 Units at 10/02/21 1107   • insulin detemir (LEVEMIR) injection 7 Units  7 Units Subcutaneous Q12H Selvin Bragg APRN   7 Units at 10/02/21 0825   • Magnesium Sulfate 2 gram Bolus, followed by 8 gram infusion (total Mg dose 10 grams)- Mg less than or equal to 1mg/dL  2 g Intravenous PRN Kieran Rene MD        Or   • Magnesium Sulfate 2 gram / 50mL Infusion (GIVE X 3 BAGS TO EQUAL 6GM TOTAL DOSE) - Mg 1.1 - 1.5 mg/dl  2 g Intravenous PRN Kieran Rene MD        Or   • Magnesium Sulfate 4 gram infusion- Mg 1.6-1.9 mg/dL  4 g Intravenous PRN Kieran Rene MD       • meropenem (MERREM) 500 mg in sodium chloride 0.9 % 100 mL IVPB-VTB  500 mg Intravenous Q24H Flakita Sprague PharmD   500 mg at 10/01/21 1750   • midazolam (VERSED) 100 mg in 100mL NS infusion  1-10 mg/hr Intravenous Titrated Saira Hardy MD 10 mL/hr at 10/02/21 0432 10 mg/hr at 10/02/21 0432   • norepinephrine (LEVOPHED) 8 mg in 250 mL NS infusion (premix)  0.02-0.3 mcg/kg/min Intravenous Titrated Sb Sullivan MD   Stopped at 10/02/21 0420   • nystatin (MYCOSTATIN) powder   Topical Q12H Gaye Velarde APRN   Given at 10/02/21 0840   • pantoprazole (PROTONIX) injection 40 mg  40 mg Intravenous Q12H Adama Brown MD   40 mg at 10/02/21 0823   • phenylephrine (GIULIA-SYNEPHRINE) 1 MG/10ML injection 200 mcg  2 mL Intravenous Q5 Min PRN Adama Brown MD       • polyethylene glycol (MIRALAX) packet 17 g  17 g Oral Daily Kieran Rene MD   17 g at 09/30/21 0902   • propofol (DIPRIVAN) infusion 10 mg/mL 100 mL  5-50 mcg/kg/min Intravenous Titrated Sukhjinder Edwards MD 26.2 mL/hr at 10/02/21 1034 50 mcg/kg/min at 10/02/21 1034   • rocuronium (ZEMURON) injection 114.4 mg  1.2 mg/kg Intravenous Once PRN Adama Brown MD       • sennosides-docusate (PERICOLACE) 8.6-50 MG per tablet 1 tablet  1 tablet Oral BID Kieran Rene MD   1 tablet at 10/01/21 8828    • sodium bicarbonate injection 8.4% 100 mEq  100 mEq Intravenous Once Lukas Red MD       • sodium bicarbonate tablet 1,300 mg  1,300 mg Oral TID Lukas Red MD   1,300 mg at 10/02/21 0823   • sodium chloride 0.9 % flush 10 mL  10 mL Intravenous PRN Adama Brown MD       • sodium chloride 0.9 % flush 10 mL  10 mL Intravenous Q12H Adama Brown MD   10 mL at 10/02/21 0841   • sodium chloride 0.9 % flush 10 mL  10 mL Intravenous PRN Adama Brown MD       • sodium chloride 0.9 % flush 10 mL  10 mL Intravenous Q12H SharifaRosa Dirk, DO   10 mL at 10/01/21 2128   • sodium chloride 0.9 % flush 10 mL  10 mL Intravenous Q12H SharifaRosa Dirk, DO   10 mL at 10/01/21 2127   • sodium chloride 0.9 % flush 10 mL  10 mL Intravenous Q12H SharifaRosa Dirk, DO   10 mL at 10/01/21 2127   • sodium chloride 0.9 % flush 10 mL  10 mL Intravenous PRN SharifaRosa Dirk, DO   10 mL at 09/26/21 0754   • sodium chloride 0.9 % flush 20 mL  20 mL Intravenous PRN Rosa Skaggs, DO       • sodium phosphates 21 mmol in sodium chloride 0.9 % 250 mL IVPB  21 mmol Intravenous PRN Adama Brown MD        Or   • sodium phosphates 15 mmol in sodium chloride 0.9 % 250 mL IVPB  15 mmol Intravenous PRN Adama Brown MD        Or   • sodium phosphates 9 mmol in sodium chloride 0.9 % 250 mL IVPB  9 mmol Intravenous PRN Adama Brown MD       • Vancomycin Pharmacy Intermittent Dosing   Does not apply Daily Flakita Sprague, PharmD       • vecuronium (NORCURON) 100 mg in sodium chloride 0.9 % 100 mL (1 mg/mL) infusion  0.6-1.2 mcg/kg/min Intravenous Titrated Sully Zhong MD 3.74 mL/hr at 10/01/21 0605 0.6 mcg/kg/min at 10/01/21 0605   • vecuronium (NORCURON) bolus from bag 1 mg/mL 5.2 mg  50 mcg/kg Intravenous Once PRN Sully Zhong MD       • vecuronium (NORCURON) injection 8 mg  8 mg Intravenous PRN Leonardo Alcocer MD   8 mg at 09/28/21 0259   •  voriconazole (VFEND) tablet 200 mg  200 mg Per G Tube Q12H Chloe Garvey MD   200 mg at 10/02/21 0823       History reviewed. No pertinent surgical history.    Social History     Socioeconomic History   • Marital status: Single     Spouse name: Not on file   • Number of children: Not on file   • Years of education: Not on file   • Highest education level: Not on file   Tobacco Use   • Smoking status: Never Smoker   • Smokeless tobacco: Current User     Types: Snuff           Lab Results (last 24 hours)     Procedure Component Value Units Date/Time    POC Glucose Once [[id number]]  (Abnormal) Collected: 10/01/21 1803    Specimen: Blood Updated: 10/01/21 1810     Glucose 174 mg/dL      Comment: Meter: [id number] : [id number] Michelle Lacey       POC Glucose Once [[id number]]  (Abnormal) Collected: 10/01/21 2123    Specimen: Blood Updated: 10/01/21 2129     Glucose 190 mg/dL      Comment: Meter: [id number] : [id number] AUSTIN WHITE       POC Glucose Once [[id number]]  (Abnormal) Collected: 10/02/21 0035    Specimen: Blood Updated: 10/02/21 0041     Glucose 266 mg/dL      Comment: Meter: [id number] : [id number] [place] WHITE       CBC & Differential [[id number]]  (Abnormal) Collected: 10/02/21 0100    Specimen: Blood Updated: 10/02/21 0202    Narrative:      The following orders were created for panel order CBC & Differential.  Procedure                               Abnormality         Status                     ---------                               -----------         ------                     CBC Auto Differential[[id number]]        Abnormal            Final result                 Please view results for these tests on the individual orders.    Comprehensive Metabolic Panel [[id number]]  (Abnormal) Collected: 10/02/21 0100    Specimen: Blood Updated: 10/02/21 0242     Glucose 251 mg/dL      BUN 42 mg/dL      Creatinine 2.56 mg/dL      Sodium 122 mmol/L      Potassium 4.0 mmol/L      Chloride 84 mmol/L       CO2 20.7 mmol/L      Calcium 7.7 mg/dL      Total Protein 6.0 g/dL      Albumin 2.85 g/dL      ALT (SGPT) 6 U/L      AST (SGOT) 10 U/L      Alkaline Phosphatase 136 U/L      Total Bilirubin 0.3 mg/dL      eGFR Non African Amer 27 mL/min/1.73      Globulin 3.2 gm/dL      A/G Ratio 0.9 g/dL      BUN/Creatinine Ratio 16.4     Anion Gap 17.3 mmol/L     Narrative:      GFR Normal >60  Chronic Kidney Disease <60  Kidney Failure <15      C-reactive Protein [[id number]]  (Abnormal) Collected: 10/02/21 0100    Specimen: Blood Updated: 10/02/21 0242     C-Reactive Protein 10.92 mg/dL     CBC Auto Differential [[id number]]  (Abnormal) Collected: 10/02/21 0100    Specimen: Blood Updated: 10/02/21 0202     WBC 10.58 10*3/mm3      RBC 2.90 10*6/mm3      Hemoglobin 8.9 g/dL      Hematocrit 26.7 %      MCV 92.1 fL      MCH 30.7 pg      MCHC 33.3 g/dL      RDW 12.5 %      RDW-SD 42.4 fl      MPV 11.3 fL      Platelets 130 10*3/mm3      Neutrophil % 90.1 %      Lymphocyte % 4.1 %      Monocyte % 3.8 %      Eosinophil % 0.4 %      Basophil % 0.3 %      Immature Grans % 1.3 %      Neutrophils, Absolute 9.54 10*3/mm3      Lymphocytes, Absolute 0.43 10*3/mm3      Monocytes, Absolute 0.40 10*3/mm3      Eosinophils, Absolute 0.04 10*3/mm3      Basophils, Absolute 0.03 10*3/mm3      Immature Grans, Absolute 0.14 10*3/mm3      nRBC 0.0 /100 WBC     aPTT [[id number]]  (Abnormal) Collected: 10/02/21 0100    Specimen: Blood Updated: 10/02/21 0244     PTT 74.4 seconds     Narrative:      PTT Heparin Therapeutic Range:  59 - 95 seconds      Bilirubin, Direct [[id number]]  (Normal) Collected: 10/02/21 0100    Specimen: Blood Updated: 10/02/21 0242     Bilirubin, Direct 0.2 mg/dL     POC Glucose Once [[id number]]  (Abnormal) Collected: 10/02/21 0548    Specimen: Blood Updated: 10/02/21 0557     Glucose 186 mg/dL      Comment: Meter: [id number] : [id number] [place] WHITE       aPTT [[id number]]  (Abnormal) Collected: 10/02/21 0712    Specimen: Blood  Updated: 10/02/21 0827     PTT 37.5 seconds     Narrative:      PTT Heparin Therapeutic Range:  59 - 95 seconds      Troponin [[id number]]  (Abnormal) Collected: 10/02/21 0749    Specimen: Blood Updated: 10/02/21 0948     Troponin T 0.056 ng/mL     Narrative:      Troponin T Reference Range:  <= 0.03 ng/mL-   Negative for AMI  >0.03 ng/mL-     Abnormal for myocardial necrosis.  Clinicians would have to utilize clinical acumen, EKG, Troponin and serial changes to determine if it is an Acute Myocardial Infarction or myocardial injury due to an underlying chronic condition.       Results may be falsely decreased if patient taking Biotin.      Blood Gas, Arterial With Co-Ox [[id number]]  (Abnormal) Collected: 10/02/21 0805    Specimen: Arterial Blood Updated: 10/02/21 0809     Site Right Radial     Grover's Test Positive     pH, Arterial 7.439 pH units      pCO2, Arterial 39.5 mm Hg      pO2, Arterial 58.3 mm Hg      Comment: 84 Value below reference range        HCO3, Arterial 26.8 mmol/L      Comment: 83 Value above reference range        Base Excess, Arterial 2.4 mmol/L      O2 Saturation, Arterial 90.6 %      Comment: 84 Value below reference range        Hemoglobin, Blood Gas 8.5 g/dL      Comment: 84 Value below reference range        Hematocrit, Blood Gas 26.0 %      Comment: 84 Value below reference range        Oxyhemoglobin 89.4 %      Comment: 84 Value below reference range        Methemoglobin 0.30 %      Carboxyhemoglobin 1.0 %      A-a Gradiant 379.8 mmHg      CO2 Content 28.0 mmol/L      Temperature 0.0 C      Barometric Pressure for Blood Gas 732 mmHg      Modality Ventilator     FIO2 70 %      Ventilator Mode VC/AC     Set Tidal Volume 440.00     Set Mech Resp Rate 29.0     PEEP 12.0     Note --     Collected by [id number]     Comment: Meter: [id number]     :  [id number]        pH, Temp Corrected --     pCO2, Temperature Corrected --     pO2, Temperature Corrected --    Blood Gas, Arterial With  Co-Ox [[id number]]  (Abnormal) Collected: 10/02/21 0947    Specimen: Arterial Blood Updated: 10/02/21 0958     Site Right Radial     Grover's Test Positive     pH, Arterial 7.431 pH units      pCO2, Arterial 39.9 mm Hg      pO2, Arterial 64.4 mm Hg      Comment: 84 Value below reference range        HCO3, Arterial 26.5 mmol/L      Comment: 83 Value above reference range        Base Excess, Arterial 2.0 mmol/L      O2 Saturation, Arterial 92.5 %      Comment: 84 Value below reference range        Hemoglobin, Blood Gas 8.6 g/dL      Comment: 84 Value below reference range        Hematocrit, Blood Gas 26.4 %      Comment: 84 Value below reference range        Oxyhemoglobin 90.8 %      Comment: 84 Value below reference range        Methemoglobin 0.80 %      Carboxyhemoglobin 1.0 %      A-a Gradiant 408.0 mmHg      CO2 Content 27.7 mmol/L      Temperature 0.0 C      Barometric Pressure for Blood Gas 732 mmHg      Modality Ventilator     FIO2 75 %      Ventilator Mode VC/AC     Set Tidal Volume 440.00     Set Mech Resp Rate 29.0     PEEP 12.0     Note --     Collected by [id number]     Comment: Meter: [id number]     :  [id number]        pH, Temp Corrected --     pCO2, Temperature Corrected --     pO2, Temperature Corrected --    POC Glucose Once [[id number]]  (Abnormal) Collected: 10/02/21 1104    Specimen: Blood Updated: 10/02/21 1114     Glucose 202 mg/dL      Comment: Meter: [id number] : [id number] Michelle Lacey               Part of this note may be an electronic transcription/translation of spoken language to printed text using the Dragon Dictation System.

## 2021-10-02 NOTE — PROGRESS NOTES
[facility] HOSPITALIST PROGRESS NOTE     Patient Identification:  Name:  Martinez Mckenna  Age:  51 y.o.  Sex:  male  :  1970  MRN:  [id number]  Visit Number:  [id number]  ROOM: [address]     Primary Care Provider:  Rafal Casillas MD    Length of stay in inpatient status:  17    Subjective     Chief Compliant:    Chief Complaint   Patient presents with   • Respiratory Distress     History of Presenting Illness:    Patient remains critically ill, intubated for airway protection, this AM ABG reviewed and increased Fi02, improved on repeat Abx, continued on broad Abx and antifungal therapy, ID following, repeated trops today and mildly elevated at 0.05, cards consulted and following, have ordered limited repeat echo, have started on ASA 81 and lower dose statin due to being on voriconazole as well, will trend Hgb and may consider adding plavix as cards rec'd but being cautious w/ cavitary lung lesion and high risk for bleeding, pum following, awaiting updated recs, remains afebrile, BP and HR stable, has had some improved UOP, Nephrology following for iHD.  Palliative had been following for GOC conversations though not here this weekend, will reach out to family with any significant clinical changes.   Objective     Current Hospital Meds:artificial tears, , Both Eyes, Q4H  aspirin, 81 mg, Oral, Daily  atorvastatin, 20 mg, Oral, Nightly  chlorhexidine, 15 mL, Mouth/Throat, Q12H  dexamethasone, 6 mg, Intravenous, Q24H  insulin aspart, 0-14 Units, Subcutaneous, Q6H  insulin detemir, 7 Units, Subcutaneous, Q12H  meropenem, 500 mg, Intravenous, Q24H  nystatin, , Topical, Q12H  pantoprazole, 40 mg, Intravenous, Q12H  polyethylene glycol, 17 g, Oral, Daily  senna-docusate sodium, 1 tablet, Oral, BID  sodium bicarbonate, 100 mEq, Intravenous, Once  sodium bicarbonate, 1,300 mg, Oral, TID  sodium chloride, 10 mL, Intravenous, Q12H  sodium chloride, 10 mL, Intravenous, Q12H  sodium chloride, 10 mL,  Intravenous, Q12H  sodium chloride, 10 mL, Intravenous, Q12H  Vancomycin Pharmacy Intermittent Dosing, , Does not apply, Daily  voriconazole, 200 mg, Per G Tube, Q12H    dexmedetomidine, 0.2-1.5 mcg/kg/hr, Last Rate: 1.5 mcg/kg/hr (10/02/21 0615)  fentaNYL Citrate,   heparin (porcine), 9.8 Units/kg/hr, Last Rate: 19.8 Units/kg/hr (10/02/21 0950)  Midazolam 1 mg/mL 100mL NS, 1-10 mg/hr, Last Rate: 10 mg/hr (10/02/21 0432)  norepinephrine, 0.02-0.3 mcg/kg/min, Last Rate: Stopped (10/02/21 0420)  propofol, 5-50 mcg/kg/min, Last Rate: 50 mcg/kg/min (10/02/21 1034)  vecuronium (NORCURON) infusion, 0.6-1.2 mcg/kg/min, Last Rate: 0.6 mcg/kg/min (10/01/21 0605)        Current Antimicrobial Therapy:  Anti-Infectives (From admission, onward)    Ordered     Dose/Rate Route Frequency Start Stop    09/30/21 1426  voriconazole (VFEND) tablet 200 mg     Ordering Provider: Chloe Garvey MD    200 mg Per G Tube Every 12 Hours 10/01/21 1800 10/21/21 1759    10/01/21 1709  vancomycin 1 g/250 mL 0.9% NS (vial-mate)     Ordering Provider: Chloe Garvey MD    1,000 mg  over 60 Minutes Intravenous Once 10/01/21 1800 10/01/21 1850    10/01/21 1152  meropenem (MERREM) 500 mg in sodium chloride 0.9 % 100 mL IVPB-VTB     Flakita Sprague, PharmD reviewed the order on 10/01/21 1229.   Ordering Provider: Flakita Sprague PharmD    500 mg  over 3 Hours Intravenous Every 24 Hours 10/01/21 1400 10/10/21 1359    10/01/21 1229  Vancomycin Pharmacy Intermittent Dosing     Ordering Provider: Flakita Sprague PharmD     Does not apply Daily 10/01/21 1315 10/10/21 0859    09/30/21 1426  voriconazole (VFEND) 500 mg in dextrose (D5W) 5 % 100 mL IVPB     Ordering Provider: Chloe Garvey MD    6 mg/kg × 83.2 kg (Adjusted)  over 60 Minutes Intravenous Every 12 Hours 09/30/21 1600 10/01/21 0520    09/29/21 1501  vancomycin 1 g/250 mL 0.9% NS (vial-mate)     Ordering Provider: Kieran Rene MD    1,000 mg  over 60 Minutes Intravenous  Once 09/29/21 1600 09/29/21 1859    09/27/21 1300  vancomycin 1 g/250 mL 0.9% NS (vial-mate)     Ordering Provider: Kieran Rene MD    1,000 mg  over 60 Minutes Intravenous Once 09/27/21 1500 09/27/21 1813    09/26/21 1426  Vancomycin Pharmacy Intermittent Dosing     Ordering Provider: Jaqui Tapia PA     Does not apply Daily 09/26/21 1515 10/01/21 0859    09/26/21 1408  meropenem (MERREM) 500 mg in sodium chloride 0.9 % 100 mL IVPB-VTB     Ordering Provider: Jaqui Tapia PA    500 mg  over 3 Hours Intravenous Every 24 Hours 09/26/21 1500 09/30/21 1739    09/23/21 1302  vancomycin 1 g/250 mL 0.9% NS (vial-mate)     Ordering Provider: Chloe Garvey MD    1,000 mg  over 60 Minutes Intravenous Once 09/23/21 1400 09/23/21 1551    09/21/21 1259  meropenem (MERREM) 500 mg in sodium chloride 0.9 % 100 mL IVPB-VTB     Ordering Provider: Chloe Garvey MD    500 mg  over 3 Hours Intravenous Every 24 Hours 09/22/21 1400 09/26/21 1707    09/21/21 1257  vancomycin 1 g/250 mL 0.9% NS (vial-mate)     Ordering Provider: Chloe Garvey MD    1,000 mg  over 60 Minutes Intravenous Once 09/21/21 1400 09/21/21 1735    09/19/21 0947  Vancomycin Pharmacy Intermittent Dosing     Ordering Provider: Chloe Garvey MD     Does not apply Daily 09/20/21 0900 09/26/21 0859    09/19/21 0947  vancomycin 1750 mg/500 mL 0.9% NS IVPB ([facility])     Ordering Provider: Chloe Garvey MD    20 mg/kg × 90.1 kg  over 120 Minutes Intravenous Once 09/19/21 1200 09/19/21 1454    09/19/21 0947  meropenem (MERREM) 500 mg in sodium chloride 0.9 % 100 mL IVPB-VTB     Ordering Provider: Chloe Garvey MD    500 mg  over 30 Minutes Intravenous Once 09/19/21 1200 09/19/21 1325    09/15/21 0443  remdesivir 100 mg in 270 mL NS     Ordering Provider: Adama Brown MD    100 mg  over 60 Minutes Intravenous Every 24 Hours 09/16/21 0600 09/19/21 1013    09/15/21 0443  remdesivir 200 mg in 290 mL NS     Ordering  Provider: Adama Brown MD    200 mg  over 60 Minutes Intravenous Every 24 Hours 09/15/21 0600 09/15/21 0742    09/14/21 1912  vancomycin 2000 mg/500 mL 0.9% NS IVPB ([facility])     Ordering Provider: Gilmer Avitia MD    20 mg/kg × 95.3 kg  over 120 Minutes Intravenous Once 09/14/21 2000 09/14/21 2240    09/14/21 1950  piperacillin-tazobactam (ZOSYN) IVPB 4.5 g in 100 mL NS VTB     Ordering Provider: Gilmer Avitia MD    4.5 g  over 30 Minutes Intravenous Once 09/14/21 1952 09/14/21 2054 09/14/21 1101  piperacillin-tazobactam (ZOSYN) IVPB 3.375 g in 100 mL NS VTB     Ordering Provider: James Interiano MD    3.375 g  over 30 Minutes Intravenous Once 09/14/21 1103 09/14/21 1202        Current Diuretic Therapy:  Diuretics (From admission, onward)    Ordered     Dose/Rate Route Frequency Start Stop    09/17/21 1314  furosemide (LASIX) injection 80 mg     Ordering Provider: Sb Sullivan MD    80 mg Intravenous Once 09/17/21 1400 09/17/21 1343        ----------------------------------------------------------------------------------------------------------------------  Vital Signs:  Temp:  [97.5 °F (36.4 °C)-99 °F (37.2 °C)] 97.6 °F (36.4 °C)  Heart Rate:  [53-80] 58  Resp:  [29-31] 29  BP: ()/(55-96) 105/70  FiO2 (%):  [70 %-75 %] 75 %  SpO2:  [88 %-96 %] 91 %  on   ;   Device (Oxygen Therapy): ventilator  Body mass index is 33.74 kg/m².    Wt Readings from Last 3 Encounters:   10/02/21 104 kg (228 lb 9.6 oz)   08/04/21 95.3 kg (210 lb)     Intake & Output (last 3 days)       09/29 0701 - 09/30 0700 09/30 0701 - 10/01 0700 10/01 0701 - 10/02 0700 10/02 0701 - 10/03 0700    I.V. (mL/kg) 2409.7 (23.6) 2319.6 (21.9) 2418.8 (23.3)     Other 110 180 30     NG/ 828 450     IV Piggyback 450 300 350     Total Intake(mL/kg) 3922.7 (38.5) 3627.6 (34.2) 3248.8 (31.2)     Urine (mL/kg/hr) 400 (0.2) 475 (0.2) 550 (0.2)     Emesis/NG output  0      Other 2500  4000     Stool  0 0     Total  Output 2900 475 4550     Net +1022.7 +3152.6 -1301.2             Stool Unmeasured Occurrence  2 x 1 x         NPO Diet  ----------------------------------------------------------------------------------------------------------------------  Physical exam:  Constitutional:  Intubated/sedated  No acute distress or discomfort  HENT:  Head:  Normocephalic and atraumatic.  Mouth:  Moist mucous membranes.    Eyes:  Conjunctivae normal. No scleral icterus.    Neck:  Neck supple.  No JVD present.    Cardiovascular:  Normal rate, regular rhythm and normal heart sounds with no murmur.  Pulmonary/Chest:  Intubated/ventiated, on 75% Fi02, no wheezing  Abdominal:  Soft. No distension and no tenderness.   Musculoskeletal:  No tenderness, and no deformity.    Neurological:  Unable to assess due to intubation/sedation  Skin:  Skin is warm and dry. No rash noted. No pallor.   Peripheral vascular:  No clubbing, no cyanosis, trace edema.  : Jim in place, mild increase in UOP  ----------------------------------------------------------------------------------------------------------------------  Results from last 7 days   Lab Units 10/02/21  0100 10/01/21  0432 09/30/21  0539 09/29/21  0408 09/28/21  1428   CRP mg/dL 10.92* 10.11* 10.58*   < >  --    WBC 10*3/mm3 10.58 13.52* 9.13   < >  --    HEMOGLOBIN g/dL 8.9* 8.4* 8.0*   < >  --    HEMATOCRIT % 26.7* 25.1* 23.7*   < >  --    MCV fL 92.1 93.0 91.9   < >  --    MCHC g/dL 33.3 33.5 33.8   < >  --    PLATELETS 10*3/mm3 130* 139* 120*   < >  --    INR   --   --   --   --  0.91    < > = values in this interval not displayed.     Results from last 7 days   Lab Units 10/02/21  0947   PH, ARTERIAL pH units 7.431   PO2 ART mm Hg 64.4*   PCO2, ARTERIAL mm Hg 39.9   HCO3 ART mmol/L 26.5*     Results from last 7 days   Lab Units 10/02/21  0100 10/01/21  0432 09/30/21  0539   SODIUM mmol/L 122* 127* 128*   POTASSIUM mmol/L 4.0 4.0 3.9   CHLORIDE mmol/L 84* 88* 88*   CO2 mmol/L 20.7* 20.4*  21.3*   BUN mg/dL 42* 61* 51*   CREATININE mg/dL 2.56* 3.70* 3.31*   EGFR IF NONAFRICN AM mL/min/1.73 27* 18* 20*   CALCIUM mg/dL 7.7* 7.8* 7.2*   GLUCOSE mg/dL 251* 153* 146*   ALBUMIN g/dL 2.85* 2.28* 2.30*   BILIRUBIN mg/dL 0.3 0.3 0.4   ALK PHOS U/L 136* 92 93   AST (SGOT) U/L 10 9 10   ALT (SGPT) U/L 6 6 7   Estimated Creatinine Clearance: 41.5 mL/min (A) (by C-G formula based on SCr of 2.56 mg/dL (H)).  No results found for: AMMONIA  Results from last 7 days   Lab Units 10/02/21  0749 10/01/21  0825   TROPONIN T ng/mL 0.056* 0.045*             Glucose   Date/Time Value Ref Range Status   10/02/2021 0548 186 (H) 70 - 130 mg/dL Final     Comment:     Meter: [id number] : [id number] AUSTIN WHITE   10/02/2021 0035 266 (H) 70 - 130 mg/dL Final     Comment:     Meter: [id number] : [id number] AUSTIN WHITE   10/01/2021 2123 190 (H) 70 - 130 mg/dL Final     Comment:     Meter: [id number] : [id number] AUSTIN WHITE   10/01/2021 1803 174 (H) 70 - 130 mg/dL Final     Comment:     Meter: [id number] : [id number] Michelle Lacey   10/01/2021 1137 188 (H) 70 - 130 mg/dL Final     Comment:     Meter: [id number] : [id number] Michelle Lacey   10/01/2021 0527 170 (H) 70 - 130 mg/dL Final     Comment:     Meter: [id number] : [id number] UASTIN WHITE   09/30/2021 2341 177 (H) 70 - 130 mg/dL Final     Comment:     Meter: [id number] : [id number] KRISTEN BROWNING   09/30/2021 2146 129 70 - 130 mg/dL Final     Comment:     Meter: [id number] : [id number] AUSTIN WHITE     Lab Results   Component Value Date    TSH 0.761 09/14/2021    FREET4 1.08 09/14/2021     No results found for: PREGTESTUR, PREGSERUM, HCG, HCGQUANT  Pain Management Panel    There is no flowsheet data to display.       Brief Urine Lab Results  (Last result in the past 365 days)      Color   Clarity   Blood   Leuk Est   Nitrite   Protein   CREAT   Urine HCG        09/14/21 1108 Yellow Clear Small (1+) Negative Negative 100 mg/dL (2+)              Blood Culture   Date Value Ref Range Status   09/27/2021 No growth at 5 days  Final   09/27/2021 No growth at 5 days  Final     No results found for: URINECX  No results found for: WOUNDCX  No results found for: STOOLCX  Respiratory Culture   Date Value Ref Range Status   09/28/2021 Light growth (2+) Yeast, Not Candida albicans (A)  Final   09/28/2021 No Normal Respiratory Lanette (A)  Final     No results found for: AFBCX  Results from last 7 days   Lab Units 10/02/21  0100 10/01/21  0432 09/30/21  0539 09/29/21  0408 09/28/21  0213 09/27/21  0110 09/26/21  0340   PROCALCITONIN ng/mL  --  1.40*  --  1.61*  --  2.26*  --    CRP mg/dL 10.92* 10.11* 10.58* 9.31* 10.90* 23.64* 16.20*     I have personally looked at the labs and they are summarized above.  ----------------------------------------------------------------------------------------------------------------------  Detailed radiology reports for the last 24 hours:  Imaging Results (Last 24 Hours)     Procedure Component Value Units Date/Time    XR Chest 1 View [[id number]] Collected: 10/02/21 0829     Updated: 10/02/21 0832    Narrative:      XR CHEST 1 VW-     CLINICAL INDICATION: intubated/ogt placement; E13.11-Other specified  diabetes mellitus with ketoacidosis with coma; J96.01-Acute respiratory  failure with hypoxia; U07.1-COVID-19; N17.9-Acute kidney failure,  unspecified; K85.90-Acute pancreatitis without necrosis or infection,  unspecified; K29.90-Gastroduodenitis, unspecified, without bleeding;  J15.9-Unspecified bacterial pneumonia; E83.39-Other disorders of  phosphorus        COMPARISON: 10/01/2021      TECHNIQUE: Single frontal view of the chest.     FINDINGS:     Left effusion and bilateral airspace disease  NG tube is in the stomach. Other lines are stable. The endotracheal tube  is 2 cm cephalad to the jono  There is no evidence of an acute osseous abnormality.   There are no suspicious-appearing parenchymal soft tissue nodules.           "Impression:      Little overall change     This report was finalized on 10/2/2021 8:30 AM by Dr. Jc Tovar MD.           Assessment & Plan    51M PMH DM Type II, reportedly tested positive for Covid 19 1 week prior, brought in unresponsive per EMS.     #Severe Sepsis/Septic Shock, Acute Metabolic Encephalopathy & Acute Hypoxic Respiratory Failure requiring intubation and mechanical ventilation 2/2 PNA, Viral, treating for Covid 19 & RSV c/b ARDS and pneumomediastinum but also covering for PNA, Bacterial, treating for MDR Organisms, also treating for PNA, Fungal w/ Candida sp. But now w/ c/f opportunistic infection w/ development of cavitary lesion RUL  - Patient presented w/ to ER via EMS and unresponsive at home, reportedly diagnosed w/ Covid 19 1 week prior, Glc read as \"high\" in the field, intubated upon arrival to ER 9/14.  HR > 90, RR > 20, WBC count > 12K, hypotensive requiring IVFs and pressors, covid +, PNA confirmed on imaging.  Has been admitted and intubated for prolonged period of time, since 9/14, has been on broad spectrum Abx and antifungal therapy but w/ c/f worsening infection, imaging now w/ c/f RUL cavitary lesion.   - Admission labs showed WBC count 16K, CRP 2.12, lactate 6.1, procal 0.35->1.37, Covid 19 +, RSV +, ABG 6.9/23/46, Resp Cx growing candida sp, MRSA negative, Bcx's NGTD, repeat Bcx's 9/27 NGTD  - AFB Cx's pending, Resp culture growing yeast not candida sp  - Echo showed LVEF 56-60%, diastolic dysfunction, otherwise unremarkable   - B/l Venous Duplex negative for DVT  - CT Head showed no acute intracranial abnormality, atrophy and CSVID  - CT Chest w/o contrast showed B/l patchy GGO's typical for Covid 19, moderate disease, dense consolidation b/l LL's; repeat CTPE 9/26 showed no PE, pneumomediastinum, significant b/l GGO's w/ LL consolidation and new cavitary process w/ filling defect in RUL   - VQ scan (perfusion only) showed low suspicion for PE  - ID consulted; Following, s/p " Remdesivir  - Pulm consulted; Following, rec'd 3 weeks Abx, repeat imaging 6-8 weeks   - Continue Vanc, Meropenem and have extended dosing today for 3 weeks total since cavitary lesion noted on 9/26, continue Voriconazole as per ID given elevated Aspergillus galactomannan Ag  - Continue extended course of Dexamethasone now daily  - Continue Heparin gtt given d-dimer > 20 though no definitive thrombus identified, covering for microvascular thormbi as well, caution given underlying cavitary lung lesion  - Continue Albuterol Inhaler PRN  - Continue IV pressors to keep MAPs > 65 or SBP > 90, on levophed still  - Continue pain and sedation gtt's as indicated  - Continue IV PPI for stress ulcer PPx  - Reviewed AM ABG and increased Fi02 due to mildly low P02, otherwise stable, repeat CXR little overall changed today  - Trend Covid 19 progression labs w/ CBC, CMP, CK, CRP, Ferritin daily and LDH, D-dimer, Fibrinogen q48hrs.  - Monitor in CCU, enhanced droplet/contact isolation precautions, continue 02 but wean as able, SAT/SBT when appropriate, palliative care following for GOC conversations     #Elevated Troponins  - Labs showed trop 0.045, previously NML on admission, up 10/2 at 0.056  - EKG showed some c/f nonspecific ST/T wave abnormality overnight  - Echo previously unremarkable; Repeating limited echo  - Cards consulted; Following, rec'd heparin, ASA, plavix, statin  - On Heparin as per above, have started on ASA 81, holding on plavix for now given cavitary lung lesion and would be on triple therapy w/ increased likelihood to bleed, have started on low dose statin due to receiving Voriconizole which can increase concentrations, trending LFTs  - Continue to monitor on tele, trend trops, trend HR and BP    #Nonoliguric -> Oliguric JACKY 2/2 Sepsis/DKA w/ suspected ATN now on intermittent HD  - B/l Cr 0.7, was 2.8 on admission, trended up to 7.0 on 9/20. Renal US showed unremarkable kidney's, no obstruction noted  -  Nephrology consulted; Following for iHD needs, some improved UOP     #Acute Pancreatitis, unclear etiology  - Admission labs showed WBC count 16K, CRP 2.12, Lipase > 3000, lactate 6.1, AST mild elevation, Tbili NML, TG's 136, YUDELKA negative, RF NML, Anti-Smith Ab negative, Anti-DS DNA NML, SSA/SSB NML, C3 mildly low, C4 NML, complement borderline low, IGG4 NML, Bcx's NGTD.  CT showed inflammation surrounding the pancreas as well as gastric antrum and first portion of duodenum favoring acute pancreatitis w/ secondary inflammation of GI tract though also considered gastroduodenitis. GB US showed no evidence of cholelithiasis.  Given serum lipase > 3x ULN, characteristic findings on imaging, patient meets criteria for acute pancreatitis diagnosis.  Patient does have ongoing organ failure which is multifactorial, local complication of secondary GI tract inflammation and could be considered severe disease but truly is multifactorial at this point.  Etiology remains unclear, repeat CTPE 9/26 still showing marked abnormalities upper abdomen suspicious for pancreatitis.     #DKA c/b Acute Encephalopathy/DM Coma, Severe Systemic Acidosis in setting of NIDDM Type II, uncontrolled, now IDDM Type II - DKA Resolved  - Hgb A1c = 11.2%, no home oral agents or SQ insulin per meds rec, given severe encephalopathy/coma on presentation required intubation for airway protection. Admission labs showed Glc up to 1100, AG 39, Bicarb 2.8, Acetone moderate, ABG 6.9/23/46, K 4.5, lactate 6.1; Continue FSBG and SSI, continue Levemir 7U qhs and titrate as indicated    #Electrolyte Abnormalities  - Acute Mild Hyperkalemia - Likely related to renal failure, K up to 5.5, Resolved  - Borderline Hypomagnesemia - Mg down to 1.6, Replaced per protocol, Resolved   - Acute Mild Hypovolemic Hypernatremia (POA & Not POA) - Na up to 152 on admission, resolved then became hyponatremic, today Na 122 and trending, will need to discuss dialysate w/  nephrology  - Acute Mild/Mod Hypocalcemia -  Replacing, on protocol. Corrected calcium 8.3.   - Acute Mild Hypophosphatemia - Phos as low as 2.0, Replaced per protocol, Resolved    #Anxiety/Depression  - Holding home Venlafaxine while intubated    F: TFs  A: Fentanyl  S: Propofol, versed, precedex  T: Heparin gtt  H: HOB elevated   U: Pantoprazole  G: SSI, basal  S: Not ready  B: Doc-senna and miralax  I: RIJ 9/14, Hemodialysis catheter 9/21  D: Vanc, meropenem, voriconazole    Code status: DNR/DNI     Dispo: Continue CCU level care     I have spent 30 minutes of critical care time (7:15-7:45AM) with > 50% of time spent in direct patient care, evaluating the patient at bedside, reviewing all labs and images, reviewing ABG and adjusting vent settings, reviewing pain and sedation gtt's, reviewing pressor requirement, communicating plan of care w/ nursing staff, utilizing high complexity medical decision making to assess and treat vital organ system failure in an individual who has impairment of one or more vital organ systems such that there is a high probability of imminent or life threatening deterioration in the patient’s condition. Failure to initiate the above interventions on an urgent basis would likely result in sudden, clinically significant or life threatening deterioration in the patient's condition.    VTE Prophylaxis:   Mechanical Order History:     None      Pharmalogical Order History:      Ordered     Dose Route Frequency Stop    09/28/21 1239  heparin (porcine) 5000 UNIT/ML injection 5,000 Units      5,000 Units IV Once 09/28/21 1439    09/28/21 1239  heparin 60138 units/250 mL (100 units/mL) in 0.45 % NaCl infusion  9.99 mL/hr      9.8 Units/kg/hr IV Titrated --    09/28/21 1239  heparin (porcine) 5000 UNIT/ML injection 5,000 Units      5,000 Units IV As Needed --    09/28/21 1239  heparin (porcine) 5000 UNIT/ML injection 2,500 Units      2,500 Units IV As Needed --    09/23/21 1512  heparin  (porcine) 5000 UNIT/ML injection 5,000 Units  Status:  Discontinued      5,000 Units SC Every 8 Hours Scheduled 09/28/21 1239    09/15/21 0440  heparin (porcine) 5000 UNIT/ML injection 5,000 Units      5,000 Units IV Once 09/15/21 0621    09/15/21 0440  heparin 39625 units/250 mL (100 units/mL) in 0.45 % NaCl infusion  8.73 mL/hr,   Status:  Discontinued      10 Units/kg/hr IV Titrated 09/23/21 1512    09/15/21 0440  heparin (porcine) 5000 UNIT/ML injection 5,000 Units  Status:  Discontinued      5,000 Units IV As Needed 09/23/21 1512    09/15/21 0440  heparin (porcine) 5000 UNIT/ML injection 2,500 Units  Status:  Discontinued      2,500 Units IV As Needed 09/23/21 1512              Kieran Rene MD  [facility]  10/02/21  10:45 EDT

## 2021-10-03 NOTE — PROGRESS NOTES
[facility] HOSPITALIST PROGRESS NOTE     Patient Identification:  Name:  Martinez Mckenna  Age:  51 y.o.  Sex:  male  :  1970  MRN:  [id number]  Visit Number:  [id number]  ROOM: [address]     Primary Care Provider:  Rafal Casillas MD    Length of stay in inpatient status:  18    Subjective     Chief Compliant:    Chief Complaint   Patient presents with   • Respiratory Distress       History of Presenting Illness:    Patient became hypoxic this AM. I went directly to evaluate patient. Patient was on 100% FiO2, PEEP of 14 and saturating in mid to upper 80's. Hypoxia worse after patient was repositioned. STAT chest x-ray appeared improved with no pneumothorax.  Patient saturating better with PEEP of 16.     I discussed grim prognosis with patient's father. He noted that he and patient's sister have been discussing goals of care and they are considering comfort care. He was agreeable to see how the next 24 hours go and make further decision pending clinical course. He notes that patient's partner has not been involved in patient's life for sometime and had not been involving them in decisions.     ROS:  Otherwise 10 point ROS negative other than documented above in HPI.     Objective     Current Hospital Meds:artificial tears, , Both Eyes, Q4H  aspirin, 81 mg, Oral, Daily  atorvastatin, 20 mg, Oral, Nightly  chlorhexidine, 15 mL, Mouth/Throat, Q12H  dexamethasone, 6 mg, Intravenous, Q24H  insulin aspart, 0-14 Units, Subcutaneous, Q6H  insulin detemir, 7 Units, Subcutaneous, Q12H  meropenem, 500 mg, Intravenous, Q24H  nystatin, , Topical, Q12H  pantoprazole, 40 mg, Intravenous, Q12H  polyethylene glycol, 17 g, Oral, Daily  senna-docusate sodium, 1 tablet, Oral, BID  sodium bicarbonate, 100 mEq, Intravenous, Once  sodium bicarbonate, 1,300 mg, Oral, TID  sodium chloride, 10 mL, Intravenous, Q12H  sodium chloride, 10 mL, Intravenous, Q12H  sodium chloride, 10 mL, Intravenous, Q12H  sodium chloride, 10  mL, Intravenous, Q12H  Vancomycin Pharmacy Intermittent Dosing, , Does not apply, Daily  voriconazole, 200 mg, Per G Tube, Q12H    dexmedetomidine, 0.2-1.5 mcg/kg/hr, Last Rate: 1.5 mcg/kg/hr (10/03/21 0647)  fentaNYL Citrate,   fentaNYL Citrate,   heparin (porcine), 9.8 Units/kg/hr, Last Rate: 20.8 Units/kg/hr (10/03/21 0552)  Midazolam 1 mg/mL 100mL NS, 1-10 mg/hr, Last Rate: 10 mg/hr (10/03/21 0909)  norepinephrine, 0.02-0.3 mcg/kg/min, Last Rate: 0.01 mcg/kg/min (10/03/21 1037)  propofol, 5-50 mcg/kg/min, Last Rate: 50 mcg/kg/min (10/03/21 0818)  vecuronium (NORCURON) infusion, 0.6-1.2 mcg/kg/min, Last Rate: 0.6 mcg/kg/min (10/02/21 1321)        Current Antimicrobial Therapy:  Anti-Infectives (From admission, onward)    Ordered     Dose/Rate Route Frequency Start Stop    09/30/21 1426  voriconazole (VFEND) tablet 200 mg     Mariana Macias APRN reviewed the order on 10/02/21 1325.   Ordering Provider: Chloe Garvey MD    200 mg Per G Tube Every 12 Hours 10/01/21 1800 10/21/21 1759    10/01/21 1709  vancomycin 1 g/250 mL 0.9% NS (vial-mate)     Ordering Provider: Chloe Garvey MD    1,000 mg  over 60 Minutes Intravenous Once 10/01/21 1800 10/01/21 1850    10/01/21 1152  meropenem (MERREM) 500 mg in sodium chloride 0.9 % 100 mL IVPB-VTB     Flakita Sprague, PharmD reviewed the order on 10/01/21 1229.   Ordering Provider: Flakita Sprague PharmD    500 mg  over 3 Hours Intravenous Every 24 Hours 10/01/21 1400 10/10/21 1359    10/01/21 1229  Vancomycin Pharmacy Intermittent Dosing     Mariana Macias APRN reviewed the order on 10/02/21 1325.   Ordering Provider: Flakita Sprague, PharmD     Does not apply Daily 10/01/21 1315 10/10/21 0859    09/30/21 1426  voriconazole (VFEND) 500 mg in dextrose (D5W) 5 % 100 mL IVPB     Ordering Provider: Chloe Garvey MD    6 mg/kg × 83.2 kg (Adjusted)  over 60 Minutes Intravenous Every 12 Hours 09/30/21 1600 10/01/21 0520    09/29/21 1501  vancomycin 1  g/250 mL 0.9% NS (vial-mate)     Ordering Provider: Kieran Rene MD    1,000 mg  over 60 Minutes Intravenous Once 09/29/21 1600 09/29/21 1859    09/27/21 1300  vancomycin 1 g/250 mL 0.9% NS (vial-mate)     Ordering Provider: Kieran Rene MD    1,000 mg  over 60 Minutes Intravenous Once 09/27/21 1500 09/27/21 1813    09/26/21 1426  Vancomycin Pharmacy Intermittent Dosing     Ordering Provider: Jaqui Tapia PA     Does not apply Daily 09/26/21 1515 10/01/21 0859    09/26/21 1408  meropenem (MERREM) 500 mg in sodium chloride 0.9 % 100 mL IVPB-VTB     Ordering Provider: Jaqui Tapia PA    500 mg  over 3 Hours Intravenous Every 24 Hours 09/26/21 1500 09/30/21 1739    09/23/21 1302  vancomycin 1 g/250 mL 0.9% NS (vial-mate)     Ordering Provider: Chloe Garvey MD    1,000 mg  over 60 Minutes Intravenous Once 09/23/21 1400 09/23/21 1551    09/21/21 1259  meropenem (MERREM) 500 mg in sodium chloride 0.9 % 100 mL IVPB-VTB     Ordering Provider: Chloe Garvey MD    500 mg  over 3 Hours Intravenous Every 24 Hours 09/22/21 1400 09/26/21 1707    09/21/21 1257  vancomycin 1 g/250 mL 0.9% NS (vial-mate)     Ordering Provider: Chloe Garvey MD    1,000 mg  over 60 Minutes Intravenous Once 09/21/21 1400 09/21/21 1735    09/19/21 0947  Vancomycin Pharmacy Intermittent Dosing     Ordering Provider: Chloe Garvey MD     Does not apply Daily 09/20/21 0900 09/26/21 0859    09/19/21 0947  vancomycin 1750 mg/500 mL 0.9% NS IVPB ([facility])     Ordering Provider: Chloe Garvey MD    20 mg/kg × 90.1 kg  over 120 Minutes Intravenous Once 09/19/21 1200 09/19/21 1454    09/19/21 0947  meropenem (MERREM) 500 mg in sodium chloride 0.9 % 100 mL IVPB-VTB     Ordering Provider: Chloe Garvey MD    500 mg  over 30 Minutes Intravenous Once 09/19/21 1200 09/19/21 1325    09/15/21 0443  remdesivir 100 mg in 270 mL NS     Ordering Provider: Adama Brown MD    100 mg  over 60 Minutes  Intravenous Every 24 Hours 09/16/21 0600 09/19/21 1013    09/15/21 0443  remdesivir 200 mg in 290 mL NS     Ordering Provider: Adama Brown MD    200 mg  over 60 Minutes Intravenous Every 24 Hours 09/15/21 0600 09/15/21 0742    09/14/21 1912  vancomycin 2000 mg/500 mL 0.9% NS IVPB ([facility])     Ordering Provider: Gilmer Avitia MD    20 mg/kg × 95.3 kg  over 120 Minutes Intravenous Once 09/14/21 2000 09/14/21 2240 09/14/21 1950  piperacillin-tazobactam (ZOSYN) IVPB 4.5 g in 100 mL NS VTB     Ordering Provider: Gilmer Avitia MD    4.5 g  over 30 Minutes Intravenous Once 09/14/21 1952 09/14/21 2054 09/14/21 1101  piperacillin-tazobactam (ZOSYN) IVPB 3.375 g in 100 mL NS VTB     Ordering Provider: James Interiano MD    3.375 g  over 30 Minutes Intravenous Once 09/14/21 1103 09/14/21 1202        Current Diuretic Therapy:  Diuretics (From admission, onward)    Ordered     Dose/Rate Route Frequency Start Stop    09/17/21 1314  furosemide (LASIX) injection 80 mg     Ordering Provider: Sb Sullivan MD    80 mg Intravenous Once 09/17/21 1400 09/17/21 1343        ----------------------------------------------------------------------------------------------------------------------  Vital Signs:  Temp:  [97.5 °F (36.4 °C)-98.2 °F (36.8 °C)] 98.1 °F (36.7 °C)  Heart Rate:  [54-62] 56  Resp:  [29-32] 32  BP: ()/(56-96) 136/92  FiO2 (%):  [75 %-100 %] 90 %  SpO2:  [88 %-97 %] 97 %  on   ;   Device (Oxygen Therapy): ventilator  Body mass index is 33.78 kg/m².    Wt Readings from Last 3 Encounters:   10/03/21 104 kg (228 lb 13.4 oz)   08/04/21 95.3 kg (210 lb)     Intake & Output (last 3 days)       09/30 0701 - 10/01 0700 10/01 0701 - 10/02 0700 10/02 0701 - 10/03 0700 10/03 0701 - 10/04 0700    I.V. (mL/kg) 2319.6 (21.9) 2418.8 (23.3) 2295.9 (22.1)     Other 180 30 50     NG/ 450 897     IV Piggyback 300 350 100     Total Intake(mL/kg) 3627.6 (34.2) 3248.8 (31.2) 3342.9 (32.1)      Urine (mL/kg/hr) 475 (0.2) 550 (0.2) 700 (0.3)     Emesis/NG output 0       Other  4000      Stool 0 0      Total Output 475 4550 700     Net +3152.6 -1301.2 +2642.9             Stool Unmeasured Occurrence 2 x 1 x          NPO Diet  ----------------------------------------------------------------------------------------------------------------------  Physical exam:  GENERAL:  Patient intubated and sedated.   SKIN:  Warm, dry without rashes, purpura or petechiae.  EYES:  Pupils equal, round and reactive to light.  EOMs intact.  Conjunctivae normal.  HEAD:  Normocephalic. Atraumatic.   EARS/NOSE/MOUTH/THROAT:  Hearing intact. Septum midline.  No excoriations or nasal discharge. No stomatitis or ulcers.  Lips normal. Oropharynx without lesions or exudates.  NECK:  Supple with good range of motion; no thyromegaly or masses  LYMPHATICS:  No cervical, supraclavicular, axillary adenopathy.  RESP:  Lungs course and decreased bilaterally.   CARDIAC:  Regular rate and rhythm without murmurs, rubs or gallops. Normal S1,S2. No edema  GI:  Soft, nontender, no hepatosplenomegaly, normal bowel sounds  MSK:  No clubbing or cyanosis, No joint swelling noted in hands  NEUROLOGICAL:  No focal deficit. Pupils equal and reactive.   ----------------------------------------------------------------------------------------------------------------------  Tele:    ----------------------------------------------------------------------------------------------------------------------  Results from last 7 days   Lab Units 10/03/21  0349 10/02/21  0100 10/01/21  0432 09/29/21  0408 09/28/21  1428   CRP mg/dL 4.65* 10.92* 10.11*   < >  --    WBC 10*3/mm3 5.91 10.58 13.52*   < >  --    HEMOGLOBIN g/dL 8.4* 8.9* 8.4*   < >  --    HEMATOCRIT % 24.9* 26.7* 25.1*   < >  --    MCV fL 90.9 92.1 93.0   < >  --    MCHC g/dL 33.7 33.3 33.5   < >  --    PLATELETS 10*3/mm3 120* 130* 139*   < >  --    INR   --   --   --   --  0.91    < > = values in this  interval not displayed.     Results from last 7 days   Lab Units 10/03/21  0907   PH, ARTERIAL pH units 7.397   PO2 ART mm Hg 58.5*   PCO2, ARTERIAL mm Hg 40.7   HCO3 ART mmol/L 25.0     Results from last 7 days   Lab Units 10/03/21  0349 10/02/21  0100 10/01/21  0432   SODIUM mmol/L 125* 122* 127*   POTASSIUM mmol/L 4.0 4.0 4.0   CHLORIDE mmol/L 87* 84* 88*   CO2 mmol/L 22.7 20.7* 20.4*   BUN mg/dL 64* 42* 61*   CREATININE mg/dL 3.49* 2.56* 3.70*   EGFR IF NONAFRICN AM mL/min/1.73 19* 27* 18*   CALCIUM mg/dL 7.9* 7.7* 7.8*   GLUCOSE mg/dL 209* 251* 153*   ALBUMIN g/dL 2.48* 2.85* 2.28*   BILIRUBIN mg/dL 0.2 0.3 0.3   ALK PHOS U/L 82 136* 92   AST (SGOT) U/L 9 10 9   ALT (SGPT) U/L 5 6 6   Estimated Creatinine Clearance: 30.4 mL/min (A) (by C-G formula based on SCr of 3.49 mg/dL (H)).  No results found for: AMMONIA  Results from last 7 days   Lab Units 10/02/21  0749 10/01/21  0825   TROPONIN T ng/mL 0.056* 0.045*             Glucose   Date/Time Value Ref Range Status   10/03/2021 0556 252 (H) 70 - 130 mg/dL Final     Comment:     Meter: [id number] : [id number] [place]   10/03/2021 0036 206 (H) 70 - 130 mg/dL Final     Comment:     Meter: [id number] : [id number] [place]   10/02/2021 2204 231 (H) 70 - 130 mg/dL Final     Comment:     Meter: [id number] : [id number] [place]   10/02/2021 1700 177 (H) 70 - 130 mg/dL Final     Comment:     Meter: [id number] : [id number] Michelle Lacey   10/02/2021 1104 202 (H) 70 - 130 mg/dL Final     Comment:     Meter: [id number] : [id number] Michelle Lacey   10/02/2021 0548 186 (H) 70 - 130 mg/dL Final     Comment:     Meter: [id number] : [id number] [place]   10/02/2021 0035 266 (H) 70 - 130 mg/dL Final     Comment:     Meter: [id number] : [id number] [place]   10/01/2021 2123 190 (H) 70 - 130 mg/dL Final     Comment:     Meter: [id number] : [id number] [place]     Lab Results   Component Value Date    TSH 0.761 09/14/2021    FREET4  1.08 09/14/2021     No results found for: PREGTESTUR, PREGSERUM, HCG, HCGQUANT  Pain Management Panel    There is no flowsheet data to display.       Brief Urine Lab Results  (Last result in the past 365 days)      Color   Clarity   Blood   Leuk Est   Nitrite   Protein   CREAT   Urine HCG        09/14/21 1108 Yellow Clear Small (1+) Negative Negative 100 mg/dL (2+)             Blood Culture   Date Value Ref Range Status   09/27/2021 No growth at 5 days  Final   09/27/2021 No growth at 5 days  Final     No results found for: URINECX  No results found for: WOUNDCX  No results found for: STOOLCX  Respiratory Culture   Date Value Ref Range Status   09/28/2021 Light growth (2+) Yeast, Not Candida albicans (A)  Final   09/28/2021 No Normal Respiratory Lanette (A)  Final     No results found for: AFBCX  Results from last 7 days   Lab Units 10/03/21  0349 10/02/21  0100 10/01/21  0432 09/30/21  0539 09/29/21  0408 09/28/21  0213 09/27/21  0110   PROCALCITONIN ng/mL  --   --  1.40*  --  1.61*  --  2.26*   CRP mg/dL 4.65* 10.92* 10.11* 10.58* 9.31* 10.90* 23.64*       I have personally looked at the labs and they are summarized above.  ----------------------------------------------------------------------------------------------------------------------  Detailed radiology reports for the last 24 hours:    Imaging Results (Last 24 Hours)     Procedure Component Value Units Date/Time    XR Chest 1 View [[id number]] Collected: 10/03/21 0943     Updated: 10/03/21 0945    Narrative:      CR Chest 1 Vw    INDICATION:   Acute respiratory failure. Covid 19 pneumonia.     COMPARISON:    10/2/2021    FINDINGS:  Portable AP view(s) of the chest.    Support lines and tubes are unchanged.    Improved lung volumes with decreasing right upper lobe infiltrate. Continued left and right basilar infiltrates. No sizable effusions or pneumothorax. Heart and mediastinum unremarkable.       Impression:      Slightly improved lung volumes with  "decreasing right upper lobe parenchymal opacity which may reflect atelectasis.    Signer Name: DAILY Davis MD   Signed: 10/3/2021 9:43 AM   Workstation Name: [facility]    [facility]        Assessment & Plan    51M PM DM Type II, reportedly tested positive for Covid 19 1 week prior, brought in unresponsive per EMS.     #Severe Sepsis/Septic Shock, Acute Metabolic Encephalopathy & Acute Hypoxic Respiratory Failure requiring intubation and mechanical ventilation 2/2 PNA, Viral, treating for Covid 19 & RSV c/b ARDS and pneumomediastinum but also covering for PNA, Bacterial, treating for MDR Organisms, also treating for PNA, Fungal w/ Candida sp. But now w/ c/f opportunistic infection w/ development of cavitary lesion RUL  - Patient presented w/ to ER via EMS and unresponsive at home, reportedly diagnosed w/ Covid 19 1 week prior, Glc read as \"high\" in the field, intubated upon arrival to ER 9/14.  HR > 90, RR > 20, WBC count > 12K, hypotensive requiring IVFs and pressors, covid +, PNA confirmed on imaging.  Has been admitted and intubated for prolonged period of time, since 9/14, has been on broad spectrum Abx and antifungal therapy but w/ c/f worsening infection, imaging now w/ c/f RUL cavitary lesion.   - Admission labs showed WBC count 16K, CRP 2.12, lactate 6.1, procal 0.35->1.37, Covid 19 +, RSV +, ABG 6.9/23/46, Resp Cx growing candida sp, MRSA negative, Bcx's NGTD, repeat Bcx's 9/27 NGTD  - AFB Cx's pending, Resp culture growing yeast not candida sp  - Echo showed LVEF 56-60%, diastolic dysfunction, otherwise unremarkable   - B/l Venous Duplex negative for DVT  - CT Head showed no acute intracranial abnormality, atrophy and CSVID  - CT Chest w/o contrast showed B/l patchy GGO's typical for Covid 19, moderate disease, dense consolidation b/l LL's; repeat CTPE 9/26 showed no PE, pneumomediastinum, significant b/l GGO's w/ LL consolidation and new cavitary process w/ filling defect " in RUL   - VQ scan (perfusion only) showed low suspicion for PE  - ID consulted; Following, s/p Remdesivir  - Pulm consulted; Following, rec'd 3 weeks Abx, repeat imaging 6-8 weeks   - Continue Vanc, Meropenem and have extended dosing today for 3 weeks total since cavitary lesion noted on 9/26, continue Voriconazole as per ID given elevated Aspergillus galactomannan Ag  - Continue extended course of Dexamethasone now daily  - Continue Heparin gtt given d-dimer > 20 though no definitive thrombus identified, covering for microvascular thormbi as well, caution given underlying cavitary lung lesion  - Continue Albuterol Inhaler PRN  - Continue IV pressors to keep MAPs > 65 or SBP > 90, off pressors this AM  - Continue pain and sedation gtt's as indicated  - Continue IV PPI for stress ulcer PPx  - Reviewed AM ABG and increased Fi02 due to mildly low P02, otherwise stable, repeat CXR little overall changed today  - Trend Covid 19 progression labs w/ CBC, CMP, CK, CRP, Ferritin daily and LDH, D-dimer, Fibrinogen q48hrs.  - Monitor in CCU, enhanced droplet/contact isolation precautions, continue 02 but wean as able, SAT/SBT when appropriate, palliative care following for GOC conversations  - ABG this AM 7.411/39/60 on 75% FiO2 and PEEP of 12 but patient become more hypoxic after being repositioned shortly after. Patient currently on 100% FiO2 and PEEP of 16 with repeat ABG 7.397/40/59.   - Discussed with pulmonology who came directly bedside to evaluate patient and make ventilator changes.   - CTPE negative on 9/26, D-dimer has trended down, patient not tachycardic, and patient has been on heparin gtt. Will hold repeat CTPE for now.     #RUL cavitary lesion  - Galactomanan antibody positive, quantiferon gold indeterminate   - Pulmonology following. ID recommending BAL with AFB.   - Continue antifungal coverage as above      #Elevated Troponins  - Labs showed trop 0.045, previously NML on admission, up 10/2 at 0.056  - EKG  showed some c/f nonspecific ST/T wave abnormality overnight  - Echo previously unremarkable; Repeating limited echo  - Cards consulted; Following, rec'd heparin, ASA, plavix, statin  - On Heparin as per above, have started on ASA 81, holding on plavix for now given cavitary lung lesion and would be on triple therapy w/ increased likelihood to bleed, have started on low dose statin due to receiving Voriconizole which can increase concentrations, trending LFTs  - Continue to monitor on tele, trend trops, trend HR and BP    #Nonoliguric -> Oliguric JACKY 2/2 Sepsis/DKA w/ suspected ATN now on intermittent HD  - B/l Cr 0.7, was 2.8 on admission, trended up to 7.0 on 9/20. Renal US showed unremarkable kidney's, no obstruction noted  - Nephrology consulted; Following for iHD needs, some improved UOP     #Acute Pancreatitis, unclear etiology  - Admission labs showed WBC count 16K, CRP 2.12, Lipase > 3000, lactate 6.1, AST mild elevation, Tbili NML, TG's 136, YUDELKA negative, RF NML, Anti-Smith Ab negative, Anti-DS DNA NML, SSA/SSB NML, C3 mildly low, C4 NML, complement borderline low, IGG4 NML, Bcx's NGTD.  CT showed inflammation surrounding the pancreas as well as gastric antrum and first portion of duodenum favoring acute pancreatitis w/ secondary inflammation of GI tract though also considered gastroduodenitis. GB US showed no evidence of cholelithiasis.  Given serum lipase > 3x ULN, characteristic findings on imaging, patient meets criteria for acute pancreatitis diagnosis.  Patient does have ongoing organ failure which is multifactorial, local complication of secondary GI tract inflammation and could be considered severe disease but truly is multifactorial at this point.  Etiology remains unclear, repeat CTPE 9/26 still showing marked abnormalities upper abdomen suspicious for pancreatitis.     #DKA c/b Acute Encephalopathy/DM Coma, Severe Systemic Acidosis in setting of NIDDM Type II, uncontrolled, now IDDM Type II - DKA  Resolved  - Hgb A1c = 11.2%, no home oral agents or SQ insulin per meds rec, given severe encephalopathy/coma on presentation required intubation for airway protection. Admission labs showed Glc up to 1100, AG 39, Bicarb 2.8, Acetone moderate, ABG 6.9/23/46, K 4.5, lactate 6.1; Continue FSBG and SSI, continue Levemir 7U qhs and titrate as indicated    #Electrolyte Abnormalities  - Acute Mild Hyperkalemia - Likely related to renal failure, K up to 5.5, Resolved  - Borderline Hypomagnesemia - Mg down to 1.6, Replaced per protocol, Resolved   - Acute Mild Hypovolemic Hypernatremia (POA & Not POA) - Na up to 152 on admission, resolved then became hyponatremic, today Na improved at 125, dialysate per nephrology   - Acute Mild/Mod Hypocalcemia -  Replacing, on protocol. Corrected calcium 8.3.   - Acute Mild Hypophosphatemia - Phos as low as 2.0, Replaced per protocol, Resolved    #Anxiety/Depression  - Holding home Venlafaxine while intubated    F: TFs  A: Fentanyl  S: Propofol, versed, precedex  T: Heparin gtt  H: HOB elevated   U: Pantoprazole  G: SSI, basal  S: Not ready  B: Doc-senna and miralax  I: RIJ 9/14, Hemodialysis catheter 9/21  D: Vanc, meropenem, voriconazole    Code status: DNR/DNI. Grim Prognosis. SOFA score currently 13. Palliative care consulted.      Dispo: Continue CCU level care     I have spent 40 minutes of critical care time (8:00-8:40 AM) with > 50% of time spent in direct patient care, evaluating the patient at bedside, reviewing all labs and images, reviewing ABG and adjusting vent settings, reviewing pain and sedation gtt's, reviewing pressor requirement, communicating plan of care w/ nursing staff, utilizing high complexity medical decision making to assess and treat vital organ system failure in an individual who has impairment of one or more vital organ systems such that there is a high probability of imminent or life threatening deterioration in the patient’s condition. Failure to initiate  the above interventions on an urgent basis would likely result in sudden, clinically significant or life threatening deterioration in the patient's condition.        VTE Prophylaxis:   Mechanical Order History:     None      Pharmalogical Order History:      Ordered     Dose Route Frequency Stop    09/28/21 1239  heparin (porcine) 5000 UNIT/ML injection 5,000 Units      5,000 Units IV Once 09/28/21 1439    09/28/21 1239  heparin 53381 units/250 mL (100 units/mL) in 0.45 % NaCl infusion  9.99 mL/hr      9.8 Units/kg/hr IV Titrated --    09/28/21 1239  heparin (porcine) 5000 UNIT/ML injection 5,000 Units      5,000 Units IV As Needed --    09/28/21 1239  heparin (porcine) 5000 UNIT/ML injection 2,500 Units      2,500 Units IV As Needed --    09/23/21 1512  heparin (porcine) 5000 UNIT/ML injection 5,000 Units  Status:  Discontinued      5,000 Units SC Every 8 Hours Scheduled 09/28/21 1239    09/15/21 0440  heparin (porcine) 5000 UNIT/ML injection 5,000 Units      5,000 Units IV Once 09/15/21 0621    09/15/21 0440  heparin 71876 units/250 mL (100 units/mL) in 0.45 % NaCl infusion  8.73 mL/hr,   Status:  Discontinued      10 Units/kg/hr IV Titrated 09/23/21 1512    09/15/21 0440  heparin (porcine) 5000 UNIT/ML injection 5,000 Units  Status:  Discontinued      5,000 Units IV As Needed 09/23/21 1512    09/15/21 0440  heparin (porcine) 5000 UNIT/ML injection 2,500 Units  Status:  Discontinued      2,500 Units IV As Needed 09/23/21 1512                    Keegan Monte MD  [facility]  10/03/21  11:04 EDT

## 2021-10-03 NOTE — PROGRESS NOTES
PROGRESS NOTE         Patient Identification:  Name:  Martinez Mckenna  Age:  51 y.o.  Sex:  male  :  1970  MRN:  [id number]  Visit Number:  [id number]  Primary Care Provider:  Rafal Casillas MD         LOS: 18 days       ----------------------------------------------------------------------------------------------------------------------  Subjective       Chief Complaints:    Respiratory Distress        Interval History:      Patient remains intubated and sedated increased FiO2 of 90% today.  Afebrile, no diarrhea.  WBC normal.  CRP improving at 4.65.  Chest x-ray from 10/3/2021 reports slightly improved lung volumes with decreasing right upper lobe parenchymal opacity that may reflect atelectasis.    Review of Systems:    Unable to obtain.  Intubated and sedated.  ----------------------------------------------------------------------------------------------------------------------      Objective       Current [facility] Meds:  artificial tears, , Both Eyes, Q4H  aspirin, 81 mg, Oral, Daily  atorvastatin, 20 mg, Oral, Nightly  chlorhexidine, 15 mL, Mouth/Throat, Q12H  dexamethasone, 6 mg, Intravenous, Q24H  insulin aspart, 0-14 Units, Subcutaneous, Q6H  insulin detemir, 7 Units, Subcutaneous, Q12H  meropenem, 500 mg, Intravenous, Q24H  nystatin, , Topical, Q12H  pantoprazole, 40 mg, Intravenous, Q12H  polyethylene glycol, 17 g, Oral, Daily  senna-docusate sodium, 1 tablet, Oral, BID  sodium bicarbonate, 100 mEq, Intravenous, Once  sodium bicarbonate, 1,300 mg, Oral, TID  sodium chloride, 10 mL, Intravenous, Q12H  sodium chloride, 10 mL, Intravenous, Q12H  sodium chloride, 10 mL, Intravenous, Q12H  sodium chloride, 10 mL, Intravenous, Q12H  Vancomycin Pharmacy Intermittent Dosing, , Does not apply, Daily  voriconazole, 200 mg, Per G Tube, Q12H      dexmedetomidine, 0.2-1.5 mcg/kg/hr, Last Rate: 1.5 mcg/kg/hr (10/03/21 8614)  fentaNYL Citrate,   fentaNYL Citrate,   heparin (porcine), 9.8 Units/kg/hr,  Last Rate: 20.8 Units/kg/hr (10/03/21 0552)  Midazolam 1 mg/mL 100mL NS, 1-10 mg/hr, Last Rate: 10 mg/hr (10/03/21 0909)  norepinephrine, 0.02-0.3 mcg/kg/min, Last Rate: 0.01 mcg/kg/min (10/03/21 1037)  propofol, 5-50 mcg/kg/min, Last Rate: 50 mcg/kg/min (10/03/21 0818)  vecuronium (NORCURON) infusion, 0.6-1.2 mcg/kg/min, Last Rate: 0.6 mcg/kg/min (10/02/21 1321)      ----------------------------------------------------------------------------------------------------------------------    Vital Signs:  Temp:  [97.5 °F (36.4 °C)-98.2 °F (36.8 °C)] 98.1 °F (36.7 °C)  Heart Rate:  [54-62] 56  Resp:  [29-32] 32  BP: ()/(56-96) 136/92  FiO2 (%):  [75 %-100 %] 90 %  Mean Arterial Pressure (Non-Invasive) for the past 24 hrs (Last 3 readings):   Noninvasive MAP (mmHg)   10/03/21 1035 109   10/03/21 1020 111   10/03/21 1005 87     SpO2 Percentage    10/03/21 1020 10/03/21 1029 10/03/21 1035   SpO2: 97% 97% 97%     SpO2:  [88 %-97 %] 97 %  on   ;   Device (Oxygen Therapy): ventilator    Body mass index is 33.78 kg/m².  Wt Readings from Last 3 Encounters:   10/03/21 104 kg (228 lb 13.4 oz)   08/04/21 95.3 kg (210 lb)        Intake/Output Summary (Last 24 hours) at 10/3/2021 1053  Last data filed at 10/3/2021 0604  Gross per 24 hour   Intake 3342.9 ml   Output 700 ml   Net 2642.9 ml     NPO Diet  ----------------------------------------------------------------------------------------------------------------------      Physical Exam:    Deferred due to COVID-19 isolation.  ----------------------------------------------------------------------------------------------------------------------  Results from last 7 days   Lab Units 10/02/21  0749 10/01/21  0825   TROPONIN T ng/mL 0.056* 0.045*             Results from last 7 days   Lab Units 10/03/21  0907   PH, ARTERIAL pH units 7.397   PO2 ART mm Hg 58.5*   PCO2, ARTERIAL mm Hg 40.7   HCO3 ART mmol/L 25.0     Results from last 7 days   Lab Units 10/03/21  0349 10/02/21  0100  10/01/21  0432 09/29/21  0408 09/28/21  1428   CRP mg/dL 4.65* 10.92* 10.11*   < >  --    WBC 10*3/mm3 5.91 10.58 13.52*   < >  --    HEMOGLOBIN g/dL 8.4* 8.9* 8.4*   < >  --    HEMATOCRIT % 24.9* 26.7* 25.1*   < >  --    MCV fL 90.9 92.1 93.0   < >  --    MCHC g/dL 33.7 33.3 33.5   < >  --    PLATELETS 10*3/mm3 120* 130* 139*   < >  --    INR   --   --   --   --  0.91    < > = values in this interval not displayed.     Results from last 7 days   Lab Units 10/03/21  0349 10/02/21  0100 10/01/21  0432   SODIUM mmol/L 125* 122* 127*   POTASSIUM mmol/L 4.0 4.0 4.0   CHLORIDE mmol/L 87* 84* 88*   CO2 mmol/L 22.7 20.7* 20.4*   BUN mg/dL 64* 42* 61*   CREATININE mg/dL 3.49* 2.56* 3.70*   EGFR IF NONAFRICN AM mL/min/1.73 19* 27* 18*   CALCIUM mg/dL 7.9* 7.7* 7.8*   GLUCOSE mg/dL 209* 251* 153*   ALBUMIN g/dL 2.48* 2.85* 2.28*   BILIRUBIN mg/dL 0.2 0.3 0.3   ALK PHOS U/L 82 136* 92   AST (SGOT) U/L 9 10 9   ALT (SGPT) U/L 5 6 6   Estimated Creatinine Clearance: 30.4 mL/min (A) (by C-G formula based on SCr of 3.49 mg/dL (H)).  No results found for: AMMONIA    Glucose   Date/Time Value Ref Range Status   10/03/2021 0556 252 (H) 70 - 130 mg/dL Final     Comment:     Meter: [id number] : [id number] AUSTIN CINDY   10/03/2021 0036 206 (H) 70 - 130 mg/dL Final     Comment:     Meter: [id number] : [id number] AUSTIN WHITE   10/02/2021 2204 231 (H) 70 - 130 mg/dL Final     Comment:     Meter: [id number] : [id number] AUSTIN MOOREMAN   10/02/2021 1700 177 (H) 70 - 130 mg/dL Final     Comment:     Meter: [id number] : [id number] Michelle Lacey   10/02/2021 1104 202 (H) 70 - 130 mg/dL Final     Comment:     Meter: [id number] : [id number] Michelle Lacey   10/02/2021 0548 186 (H) 70 - 130 mg/dL Final     Comment:     Meter: [id number] : [id number] AUSTIN MOOREMAN   10/02/2021 0035 266 (H) 70 - 130 mg/dL Final     Comment:     Meter: [id number] : [id number] AUSTIN WHITE   10/01/2021 2123 190 (H) 70 - 130 mg/dL Final     Comment:      Meter: [id number] : [id number] AUSTIN WHITE     Lab Results   Component Value Date    HGBA1C 11.20 (H) 09/14/2021     Lab Results   Component Value Date    TSH 0.761 09/14/2021    FREET4 1.08 09/14/2021       Blood Culture   Date Value Ref Range Status   09/14/2021 No growth at 2 days  Preliminary   09/14/2021 No growth at 2 days  Preliminary     No results found for: URINECX  No results found for: WOUNDCX  No results found for: STOOLCX  No results found for: RESPCX  Pain Management Panel    There is no flowsheet data to display.           ----------------------------------------------------------------------------------------------------------------------  Imaging Results (Last 24 Hours)       Procedure Component Value Units Date/Time    XR Chest 1 View [[id number]] Collected: 10/03/21 0943     Updated: 10/03/21 0945    Narrative:      CR Chest 1 Vw    INDICATION:   Acute respiratory failure. Covid 19 pneumonia.     COMPARISON:    10/2/2021    FINDINGS:  Portable AP view(s) of the chest.    Support lines and tubes are unchanged.    Improved lung volumes with decreasing right upper lobe infiltrate. Continued left and right basilar infiltrates. No sizable effusions or pneumothorax. Heart and mediastinum unremarkable.       Impression:      Slightly improved lung volumes with decreasing right upper lobe parenchymal opacity which may reflect atelectasis.    Signer Name: DAILY Davis MD   Signed: 10/3/2021 9:43 AM   Workstation Name: [email or web address]    [facility]            ----------------------------------------------------------------------------------------------------------------------    Assessment/Plan       Assessment/Plan     ASSESSMENT:    1.  Septic shock with lactic acid greater than 4 on admission  2.  Covid pneumonia  3.  Aspiration pneumonia    PLAN:    Patient remains intubated and sedated increased FiO2 of 90% today.  Afebrile, no diarrhea.  WBC normal.  CRP improving at  4.65.  Chest x-ray from 10/3/2021 reports slightly improved lung volumes with decreasing right upper lobe parenchymal opacity that may reflect atelectasis.    Procalcitonin from 10/1/2021 improved at 1.40.  Fungal antibody panel negative.  QuantiFERON-TB gold is indeterminate.  Aspergillus galactomannan antigen positive at 1.88.      Respiratory culture from 9/28/2021 has finalized with light growth of yeast, not Candida albicans.      CT chest with PE protocol on 9/26/2021 shows markedly limited examination with no gross pulmonary emboli.  Pneumomediastinum.  Correlate with ventilation settings.  Significant bilateral groundglass infiltrates with lower lobe consolidation and new cavitary process with filling defect in the right upper lobe.  Correlate for superimposed opportunistic infection.  Marked abnormalities in the upper abdomen suspicious for pancreatitis.  Incidental fluid-filled colon loops that may be correlated clinically with diarrhea.  Moderate steatosis of the liver, correlate with LFTs.    Legionella screen negative.  Strep pneumo screen negative. Respiratory culture from 9/15/2021 finalized as light growth of Candida albicans. Respiratory panel from 9/15/2021 detected RSV.  Blood cultures from 9/14/2021 finalized as no growth.  MRSA screen was negative on 9/15/2021.    Recommend to continue current courses of Merrem, voriconazole, vancomycin.    In the setting of cavitary lesion and indeterminant QuantiFERON TB Gold there is strong indication for bronchoscopy with BAL for AFB.    Pulmonology note reviewed, in regard to cavitary lesion, recommendation to follow-up with outpatient imaging in 6 to 8 weeks and suggested antibiotic course x3 weeks.     We will continue to follow closely and adjust coverage as needed.    Code Status:   Code Status and Medical Interventions:   Ordered at: 09/28/21 1631     Limited Support to NOT Include:    Cardioversion/Defibrillation     Level Of Support Discussed With:     Next of Kin (If No Surrogate)     Code Status:    No CPR     Medical Interventions (Level of Support Prior to Arrest):    Limited         ALYSE Mathis  10/03/21  10:53 EDT

## 2021-10-03 NOTE — PROGRESS NOTES
[facility]         Dr Burrell Sayied   LOS: 18 days   Patient Care Team:  Rafal Casillas MD as PCP - General (Family Medicine)    Chief Complaint: Vent management with COVID-19 pneumonia ARDS DKA renal failure cavitary lung lesion    Interval History: Called to evaluate patient stat desaturating.  Patient while positioning started desaturating.  I went ahead and adjusted vent to pressure control ventilation and drop the PEEP to 14 with a rate of 32 and we were able to have better sats also we started Levophed on top of current drips of Versed Nimbex heparin Precedex propofol and fentanyl.    REVIEW OF SYSTEMS:   Sedated intubated paralyzed    Ventilator/Non-Invasive Ventilation Settings (From admission, onward)     Start     Ordered    10/01/21 0729  Ventilator - AC/VC; (29); 50; 90%; 12; 440  Continuous     Comments: Changes made by MD   Question Answer Comment   Vent Mode AC/VC    Breath rate  29   FiO2 50    FiO2 titrate for Sp02% =/> 90%    PEEP 12    Tidal Volume 440        10/01/21 0728    09/29/21 1313  Ventilator - AC/VC; (29); 50; 90%; 12; 460  Continuous,   Status:  Canceled     Comments: Changes made by MD   Question Answer Comment   Vent Mode AC/VC    Breath rate  29   FiO2 50    FiO2 titrate for Sp02% =/> 90%    PEEP 12    Tidal Volume 460        09/29/21 1312    09/28/21 1749  Ventilator - AC/VC; (30); 80; 90%; 12; 440  Continuous,   Status:  Canceled     Comments: Changes made by MD   Question Answer Comment   Vent Mode AC/VC    Breath rate  30   FiO2 80    FiO2 titrate for Sp02% =/> 90%    PEEP 12    Tidal Volume 440        09/28/21 1750    09/28/21 0639  Ventilator - AC/PC; (28); 70; 90%; Other (see comments); 14; 15  Continuous,   Status:  Canceled     Question Answer Comment   Vent Mode AC/PC    Breath rate  28   FiO2 70    FiO2 titrate for Sp02% =/> 90%    PEEP Other (see comments)    PEEP: 14    Inspiratory Pressure 15        09/28/21 0639    09/21/21 1347  Ventilator -  AC/PC; (24); 60; 90%; 12; 15  Continuous,   Status:  Canceled     Question Answer Comment   Vent Mode AC/PC    Breath rate  24   FiO2 60    FiO2 titrate for Sp02% =/> 90%    PEEP 12    Inspiratory Pressure 15        09/21/21 1346    09/18/21 1233  Ventilator - AC/PC; (14); 60; 90%; 10; 15  Continuous,   Status:  Canceled     Comments: Increase peep 10   Question Answer Comment   Vent Mode AC/PC    Breath rate  14   FiO2 60    FiO2 titrate for Sp02% =/> 90%    PEEP 10    Inspiratory Pressure 15        09/18/21 1233    09/18/21 0924  Ventilator - AC/PC; (14); 60; 90%; 8; 15  Continuous,   Status:  Canceled     Comments: Increase to 60% and decrease rate to 14, abg at 1200   Question Answer Comment   Vent Mode AC/PC    Breath rate  14   FiO2 60    FiO2 titrate for Sp02% =/> 90%    PEEP 8    Inspiratory Pressure 15        09/18/21 0923    09/18/21 0922  Ventilator - AC/PC; (4); 60; 90%; 8; 15  Continuous,   Status:  Canceled     Comments: Increase to 60% and decrease rate to 14, abg at 1200   Question Answer Comment   Vent Mode AC/PC    Breath rate  4   FiO2 60    FiO2 titrate for Sp02% =/> 90%    PEEP 8    Inspiratory Pressure 15        09/18/21 0922    09/16/21 1222  Ventilator - AC/PC; (18); 40; 8  Continuous,   Status:  Canceled     Question Answer Comment   Vent Mode AC/PC    Breath rate  18   FiO2 40    PEEP 8        09/16/21 1222    09/15/21 1652  Ventilator - AC/VC+; (28); 40; 8; 500  Continuous,   Status:  Canceled     Question Answer Comment   Vent Mode AC/VC+    Breath rate  28   FiO2 40    PEEP 8    Tidal Volume 500        09/15/21 1652    09/15/21 0834  Ventilator - AC/VC; (28); 90; 8; 500  Continuous,   Status:  Canceled     Question Answer Comment   Vent Mode AC/VC    Breath rate  28   FiO2 90    PEEP 8    Tidal Volume 500        09/15/21 0834                  Vital Signs  Temp:  [97.5 °F (36.4 °C)-98.2 °F (36.8 °C)] 98.1 °F (36.7 °C)  Heart Rate:  [54-62] 56  Resp:  [29-32] 32  BP: ()/(56-96)  "136/92  FiO2 (%):  [75 %-100 %] 90 %    Intake/Output Summary (Last 24 hours) at 10/3/2021 1102  Last data filed at 10/3/2021 0604  Gross per 24 hour   Intake 3342.9 ml   Output 700 ml   Net 2642.9 ml     Flowsheet Rows      First Filed Value   Admission Height  175.3 cm (69\") Documented at 09/14/2021 1053   Admission Weight  95.3 kg (210 lb) Documented at 09/14/2021 1053          Physical Exam:  Patient is examined using the personal protective equipment as per guidelines from infection control for this particular patient as enacted.  Hand hygiene was performed before and after patient interaction.   General Appearance:   Sedated intubated paralyzed  Neck midline trachea, no thyromegaly   Lungs:    Diminished breath sounds equal on the vent    Heart:    Regular rhythm and normal rate, normal S1 and S2, no            murmur, no gallop, no rub, no click   Chest Wall:    No abnormalities observed   Abdomen:     Normal bowel sounds, no masses, no organomegaly, soft        nontender, nondistended, no guarding, no rebound                tenderness   Extremities:  Trace to 1+ edema, no cyanosis, no             redness  CNS sedated intubated paralyzed  Skin no rashes no nodules  Musculoskeletal no cyanosis no clubbing      Results Review:        Results from last 7 days   Lab Units 10/03/21  0349 10/02/21  0100 10/02/21  0100 10/01/21  0432 10/01/21  0432   SODIUM mmol/L 125*  --  122*  --  127*   POTASSIUM mmol/L 4.0  --  4.0  --  4.0   CHLORIDE mmol/L 87*  --  84*  --  88*   CO2 mmol/L 22.7  --  20.7*  --  20.4*   BUN mg/dL 64*  --  42*  --  61*   CREATININE mg/dL 3.49*  --  2.56*  --  3.70*   GLUCOSE mg/dL 209*   < > 251*   < > 153*   CALCIUM mg/dL 7.9*  --  7.7*  --  7.8*    < > = values in this interval not displayed.     Results from last 7 days   Lab Units 10/02/21  0749 10/01/21  0825   TROPONIN T ng/mL 0.056* 0.045*     Results from last 7 days   Lab Units 10/03/21  0349 10/02/21  0100 10/01/21  0432   WBC " 10*3/mm3 5.91 10.58 13.52*   HEMOGLOBIN g/dL 8.4* 8.9* 8.4*   HEMATOCRIT % 24.9* 26.7* 25.1*   PLATELETS 10*3/mm3 120* 130* 139*     Results from last 7 days   Lab Units 10/03/21  0349 10/02/21  1550 10/02/21  0712 09/28/21 2002 09/28/21  1428   INR   --   --   --   --  0.91   APTT seconds 44.3* >100.0* 37.5*   < > 29.2    < > = values in this interval not displayed.                 Results from last 7 days   Lab Units 10/03/21  0907   PH, ARTERIAL pH units 7.397   PO2 ART mm Hg 58.5*   PCO2, ARTERIAL mm Hg 40.7   HCO3 ART mmol/L 25.0       I reviewed the patient's new clinical results.  I personally viewed and interpreted the patient's chest x-ray.        Medication Review:   artificial tears, , Both Eyes, Q4H  aspirin, 81 mg, Oral, Daily  atorvastatin, 20 mg, Oral, Nightly  chlorhexidine, 15 mL, Mouth/Throat, Q12H  dexamethasone, 6 mg, Intravenous, Q24H  insulin aspart, 0-14 Units, Subcutaneous, Q6H  insulin detemir, 7 Units, Subcutaneous, Q12H  meropenem, 500 mg, Intravenous, Q24H  nystatin, , Topical, Q12H  pantoprazole, 40 mg, Intravenous, Q12H  polyethylene glycol, 17 g, Oral, Daily  senna-docusate sodium, 1 tablet, Oral, BID  sodium bicarbonate, 100 mEq, Intravenous, Once  sodium bicarbonate, 1,300 mg, Oral, TID  sodium chloride, 10 mL, Intravenous, Q12H  sodium chloride, 10 mL, Intravenous, Q12H  sodium chloride, 10 mL, Intravenous, Q12H  sodium chloride, 10 mL, Intravenous, Q12H  Vancomycin Pharmacy Intermittent Dosing, , Does not apply, Daily  voriconazole, 200 mg, Per G Tube, Q12H        dexmedetomidine, 0.2-1.5 mcg/kg/hr, Last Rate: 1.5 mcg/kg/hr (10/03/21 0647)  fentaNYL Citrate,   fentaNYL Citrate,   heparin (porcine), 9.8 Units/kg/hr, Last Rate: 20.8 Units/kg/hr (10/03/21 0552)  Midazolam 1 mg/mL 100mL NS, 1-10 mg/hr, Last Rate: 10 mg/hr (10/03/21 0909)  norepinephrine, 0.02-0.3 mcg/kg/min, Last Rate: 0.01 mcg/kg/min (10/03/21 1037)  propofol, 5-50 mcg/kg/min, Last Rate: 50 mcg/kg/min (10/03/21  0818)  vecuronium (NORCURON) infusion, 0.6-1.2 mcg/kg/min, Last Rate: 0.6 mcg/kg/min (10/02/21 1321)        ASSESSMENT:   1. Acute hypoxic and hypercapnic respiratory failure, on MV 9/14  2. COVID-19 pneumonia  3. Severe ARDS, PF ratio 97  4. RSV URTI  5. Hypotension, secondary sedation  6. Bacterial pneumonia (elevated procalcitonin) posterior consolidations  7. Severe Metabolic acidosis  8. Acute diabetic ketoacidosis, resolved  9. Acute renal failure  10. Acute pancreatitis, improved  11. Metabolic acidosis, secondary to renal failure  12. Poorly controlled DM - Hba1c - 11%  13. Candida albicans in sputum, probable contamination/colonization  14. Proteinuria  15. Propofol induced hypertriglyceridemia, mild  16. Anemia  17. Elevated d dimer -elevated - can certainly be seen in covid ards, neg studies so far, on heparin  18. Cavitary lung lesion     PLAN:  Events noted this morning with desaturation.  Remains heavily sedated and paralyzed.  Vent adjusted to pressure control ventilation with PEEP of 14 and currently saturating much better.  Wean down FiO2 to keep sats above 90%.  Once FiO2 at 60% requirement will start weaning down PEEP.  Repeat ABGs were reviewed and noted PO2 of 58 and vent were adjusted afterwards.  Once FiO2 60% or less we will try weaning off Nimbex drip first.  Cavitary lung lesion will need outpatient follow-up with repeat CT chest.  Antibiotics recommended for 3 weeks  Continue Decadron 6 mg daily and start weaning after day 10.  Discussed with nursing staff.  Patient critically ill  Discussed with hospitalist    Critical care time 35 minutes            Indra Leblanc MD  10/03/21  11:02 EDT

## 2021-10-03 NOTE — PROGRESS NOTES
[facility] Cardiology  INPATIENT PROGRESS NOTE    Name: Martinez Mckenna  Age/Sex: 51 y.o. male  :  1970        PCP: Rafal Casillas MD    Assessment and plan  DW RN  DW renal    COVID-19 pneumonia with acute hypoxic hypercapnic respiratory failure  ARDS bacterial pneumonia   severe metabolic acidosis pending HD   acute diabetic ketoacidosis  acute renal failure  acute pancreatitis  Multiorgan failure  Candidemia  anemia   Supply demand mismatch type II MI  Abnormal EKG  Altered mental status admitted in an unresponsive state  Sacral decubitus per noted    Guarded prognosis in the severely compromised patient  primary cardiology to assume care in AM  Thank you for allowing me to participate in this patient care  Austin Shamgold DO      Subjective    events of hypoxia noted in setting of truning patient for review of decubitus         Vital Signs  Temp:  [97.5 °F (36.4 °C)-98.2 °F (36.8 °C)] 98.1 °F (36.7 °C)  Heart Rate:  [54-62] 56  Resp:  [29-32] 32  BP: ()/(56-96) 136/92  FiO2 (%):  [75 %-100 %] 90 %  Body mass index is 33.78 kg/m².    Due to COVID-19 physical exam has been deferred telemetry monitor reviewed      Patient Active Problem List   Diagnosis   • Diabetic acidosis (HCC)       Past Medical History:   Diagnosis Date   • Diabetes mellitus (CMS/HCC)        Current Facility-Administered Medications   Medication Dose Route Frequency Provider Last Rate Last Admin   • acetaminophen (TYLENOL) tablet 650 mg  650 mg Oral Q6H PRN Adama Brown MD   650 mg at 21 1031    Or   • acetaminophen (TYLENOL) suppository 650 mg  650 mg Rectal Q4H PRN Adama Brown MD       • albuterol sulfate HFA (PROVENTIL HFA;VENTOLIN HFA;PROAIR HFA) inhaler 2 puff  2 puff Inhalation Q6H PRN Kieran Rene MD       • artificial tears ophthalmic ointment   Both Eyes Q4H Sully Zhong MD   Given at 10/03/21 0908   • aspirin EC tablet 81 mg  81 mg Oral Daily Kieran Rene MD   81 mg at 10/03/21  0908   • atorvastatin (LIPITOR) tablet 20 mg  20 mg Oral Nightly Kieran Rene MD   20 mg at 10/02/21 2144   • calcium gluconate 1 g in sodium chloride 0.9 % 100 mL IVPB  1 g Intravenous PRN Kieran Rene MD        And   • calcium gluconate 6 g in sodium chloride 0.9 % 500 mL IVPB  6 g Intravenous PRN Kieran Rene MD       • chlorhexidine (PERIDEX) 0.12 % solution 15 mL  15 mL Mouth/Throat Q12H Kieran Rene MD   15 mL at 10/03/21 0908   • dexamethasone (DECADRON) injection 6 mg  6 mg Intravenous Q24H Hardy Wing MD   6 mg at 10/03/21 0555   • DEXMEDETOMIDINE 1000 MCG/250ML INFUSION infusion  0.2-1.5 mcg/kg/hr Intravenous Titrated Adama Brown MD 35.7 mL/hr at 10/03/21 0647 1.5 mcg/kg/hr at 10/03/21 0647   • dextrose (D50W) 25 g/ 50mL Intravenous Solution 25 g  25 g Intravenous Q15 Min PRN Kieran Rene MD   25 g at 09/29/21 0541   • dextrose (D50W) 25 g/ 50mL Intravenous Solution 25 g  25 g Intravenous Q15 Min PRN Sully Zhong MD   25 g at 09/29/21 0542   • dextrose (GLUTOSE) oral gel 15 g  15 g Oral Q15 Min PRN Kieran Rene MD       • dextrose (GLUTOSE) oral gel 15 g  15 g Oral Q15 Min PRN Sully Zhong MD       • fentaNYL (SUBLIMAZE) PCA 1500 mcg/30 mL syringe   Intravenous Titrated Keegan Monte MD   New Bag at 10/03/21 0819   • fentaNYL (SUBLIMAZE) PCA 1500 mcg/30 mL syringe   Intravenous Titrated Keegan Monte MD   Currently Infusing at 10/03/21 0753   • glucagon (human recombinant) (GLUCAGEN DIAGNOSTIC) injection 1 mg  1 mg Subcutaneous Q15 Min PRN Kieran Rene MD       • glucagon (human recombinant) (GLUCAGEN DIAGNOSTIC) injection 1 mg  1 mg Subcutaneous Q15 Min PRN Sully Zhong MD       • heparin (porcine) 5000 UNIT/ML injection 2,500 Units  2,500 Units Intravenous PRN Kieran Rene MD       • heparin (porcine) 5000 UNIT/ML injection 5,000 Units  5,000 Units Intravenous PRN Kieran Rene MD   5,000 Units at 09/28/21 2147   • heparin  01578 units/250 mL (100 units/mL) in 0.45 % NaCl infusion  9.8 Units/kg/hr Intravenous Titrated Kieran Rene MD 21.2 mL/hr at 10/03/21 0552 20.8 Units/kg/hr at 10/03/21 0552   • insulin aspart (novoLOG) injection 0-14 Units  0-14 Units Subcutaneous Q6H Kieran Rene MD   8 Units at 10/03/21 0600   • insulin detemir (LEVEMIR) injection 7 Units  7 Units Subcutaneous Q12H Selvin Bragg APRN   7 Units at 10/03/21 0910   • Magnesium Sulfate 2 gram Bolus, followed by 8 gram infusion (total Mg dose 10 grams)- Mg less than or equal to 1mg/dL  2 g Intravenous PRN Kieran Rene MD        Or   • Magnesium Sulfate 2 gram / 50mL Infusion (GIVE X 3 BAGS TO EQUAL 6GM TOTAL DOSE) - Mg 1.1 - 1.5 mg/dl  2 g Intravenous PRN Kieran Rene MD        Or   • Magnesium Sulfate 4 gram infusion- Mg 1.6-1.9 mg/dL  4 g Intravenous PRN Kieran Rene MD       • meropenem (MERREM) 500 mg in sodium chloride 0.9 % 100 mL IVPB-VTB  500 mg Intravenous Q24H Flakita Sprague PharmD   500 mg at 10/02/21 1321   • midazolam (VERSED) 100 mg in 100mL NS infusion  1-10 mg/hr Intravenous Titrated Saira Hardy MD 10 mL/hr at 10/03/21 0909 10 mg/hr at 10/03/21 0909   • norepinephrine (LEVOPHED) 8 mg in 250 mL NS infusion (premix)  0.02-0.3 mcg/kg/min Intravenous Titrated bS Sullivan MD 1.83 mL/hr at 10/03/21 1037 0.01 mcg/kg/min at 10/03/21 1037   • nystatin (MYCOSTATIN) powder   Topical Q12H Gaye Velarde APRN   Given at 10/03/21 0908   • pantoprazole (PROTONIX) injection 40 mg  40 mg Intravenous Q12H Adama Brown MD   40 mg at 10/03/21 0908   • phenylephrine (GIULIA-SYNEPHRINE) 1 MG/10ML injection 200 mcg  2 mL Intravenous Q5 Min PRN Adama Brown MD       • polyethylene glycol (MIRALAX) packet 17 g  17 g Oral Daily Kieran Rene MD   17 g at 09/30/21 0902   • propofol (DIPRIVAN) infusion 10 mg/mL 100 mL  5-50 mcg/kg/min Intravenous Titrated Sukhjinder Edwards MD 26.2 mL/hr at 10/03/21 0818 50 mcg/kg/min at  10/03/21 0818   • rocuronium (ZEMURON) injection 114.4 mg  1.2 mg/kg Intravenous Once PRN Adama Brown MD       • sennosides-docusate (PERICOLACE) 8.6-50 MG per tablet 1 tablet  1 tablet Oral BID Kieran Rene MD   1 tablet at 10/01/21 2127   • sodium bicarbonate injection 8.4% 100 mEq  100 mEq Intravenous Once Lukas Red MD       • sodium bicarbonate tablet 1,300 mg  1,300 mg Oral TID Lukas Red MD   1,300 mg at 10/03/21 0908   • sodium chloride 0.9 % flush 10 mL  10 mL Intravenous PRN Adama Brown MD       • sodium chloride 0.9 % flush 10 mL  10 mL Intravenous Q12H Adama Brown MD   10 mL at 10/03/21 0941   • sodium chloride 0.9 % flush 10 mL  10 mL Intravenous PRN Adama Brown MD       • sodium chloride 0.9 % flush 10 mL  10 mL Intravenous Q12H SharifaRosa Dirk, DO   10 mL at 10/02/21 2145   • sodium chloride 0.9 % flush 10 mL  10 mL Intravenous Q12H SharifaSamuelee Dirk, DO   10 mL at 10/02/21 2145   • sodium chloride 0.9 % flush 10 mL  10 mL Intravenous Q12H SharifaRosa Dirk, DO   10 mL at 10/02/21 2144   • sodium chloride 0.9 % flush 10 mL  10 mL Intravenous PRN SharifaRosa Dirk, DO   10 mL at 09/26/21 0754   • sodium chloride 0.9 % flush 20 mL  20 mL Intravenous PRN SharifaRosa Dirk, DO       • sodium phosphates 21 mmol in sodium chloride 0.9 % 250 mL IVPB  21 mmol Intravenous PRN Adama Brown MD        Or   • sodium phosphates 15 mmol in sodium chloride 0.9 % 250 mL IVPB  15 mmol Intravenous PRN Adama Brown MD        Or   • sodium phosphates 9 mmol in sodium chloride 0.9 % 250 mL IVPB  9 mmol Intravenous PRN Adama Brown MD       • Vancomycin Pharmacy Intermittent Dosing   Does not apply Daily Flakita Sprague, PharmD       • vecuronium (NORCURON) 100 mg in sodium chloride 0.9 % 100 mL (1 mg/mL) infusion  0.6-1.2 mcg/kg/min Intravenous Titrated Sully Zhong MD 3.74 mL/hr at 10/02/21 1321 0.6 mcg/kg/min  at 10/02/21 1321   • vecuronium (NORCURON) bolus from bag 1 mg/mL 5.2 mg  50 mcg/kg Intravenous Once PRN Sully Zhong MD       • vecuronium (NORCURON) injection 8 mg  8 mg Intravenous PRN Leonardo Alcocer MD   8 mg at 09/28/21 0259   • voriconazole (VFEND) tablet 200 mg  200 mg Per G Tube Q12H Chloe Garvey MD   200 mg at 10/03/21 0554       History reviewed. No pertinent surgical history.    Social History     Socioeconomic History   • Marital status: Single     Spouse name: Not on file   • Number of children: Not on file   • Years of education: Not on file   • Highest education level: Not on file   Tobacco Use   • Smoking status: Never Smoker   • Smokeless tobacco: Current User     Types: Snuff           Lab Results (last 24 hours)     Procedure Component Value Units Date/Time    aPTT [[id number]]  (Abnormal) Collected: 10/02/21 1550    Specimen: Blood Updated: 10/02/21 1634     PTT >100.0 seconds     Narrative:      PTT Heparin Therapeutic Range:  59 - 95 seconds      POC Glucose Once [[id number]]  (Abnormal) Collected: 10/02/21 1700    Specimen: Blood Updated: 10/02/21 1712     Glucose 177 mg/dL      Comment: Meter: [id number] : [id number] Michelle Lacey       POC Glucose Once [[id number]]  (Abnormal) Collected: 10/02/21 2204    Specimen: Blood Updated: 10/02/21 2217     Glucose 231 mg/dL      Comment: Meter: [id number] : [id number] AUSTIN WHITE       POC Glucose Once [[id number]]  (Abnormal) Collected: 10/03/21 0036    Specimen: Blood Updated: 10/03/21 0042     Glucose 206 mg/dL      Comment: Meter: [id number] : [id number] AUSTIN WHITE       CBC & Differential [[id number]]  (Abnormal) Collected: 10/03/21 0349    Specimen: Blood Updated: 10/03/21 0428    Narrative:      The following orders were created for panel order CBC & Differential.  Procedure                               Abnormality         Status                     ---------                               -----------         ------                      CBC Auto Differential[[id number]]        Abnormal            Final result               Scan Slide[[id number]]                                                                    Please view results for these tests on the individual orders.    Comprehensive Metabolic Panel [[id number]]  (Abnormal) Collected: 10/03/21 0349    Specimen: Blood Updated: 10/03/21 0440     Glucose 209 mg/dL      BUN 64 mg/dL      Creatinine 3.49 mg/dL      Sodium 125 mmol/L      Potassium 4.0 mmol/L      Comment: Slight hemolysis detected by analyzer. Results may be affected.        Chloride 87 mmol/L      CO2 22.7 mmol/L      Calcium 7.9 mg/dL      Total Protein 5.6 g/dL      Albumin 2.48 g/dL      ALT (SGPT) 5 U/L      AST (SGOT) 9 U/L      Alkaline Phosphatase 82 U/L      Total Bilirubin 0.2 mg/dL      eGFR Non African Amer 19 mL/min/1.73      Globulin 3.1 gm/dL      A/G Ratio 0.8 g/dL      BUN/Creatinine Ratio 18.3     Anion Gap 15.3 mmol/L     Narrative:      GFR Normal >60  Chronic Kidney Disease <60  Kidney Failure <15      C-reactive Protein [[id number]]  (Abnormal) Collected: 10/03/21 0349    Specimen: Blood Updated: 10/03/21 0436     C-Reactive Protein 4.65 mg/dL     D-dimer, Quantitative [[id number]]  (Abnormal) Collected: 10/03/21 0349    Specimen: Blood Updated: 10/03/21 0441     D-Dimer, Quantitative 11.35 MCGFEU/mL     Narrative:      d-Dimer assay result is to be used in conjunction with a non-high clinical pretest probability (PTP) assessment tool to exclude PE and aid in diagnosis of VTE with cutoff of 0.5 MCGFEU/mL.    Ferritin [[id number]]  (Abnormal) Collected: 10/03/21 0349    Specimen: Blood Updated: 10/03/21 0442     Ferritin 1,934.00 ng/mL     Narrative:      Results may be falsely decreased if patient taking Biotin.      Lactate Dehydrogenase [[id number]]  (Normal) Collected: 10/03/21 0349    Specimen: Blood Updated: 10/03/21 0440      U/L     Procalcitonin [[id number]] Collected: 10/03/21 0349     Specimen: Blood Updated: 10/03/21 0411    CBC Auto Differential [[id number]]  (Abnormal) Collected: 10/03/21 0349    Specimen: Blood Updated: 10/03/21 0427     WBC 5.91 10*3/mm3      RBC 2.74 10*6/mm3      Hemoglobin 8.4 g/dL      Hematocrit 24.9 %      MCV 90.9 fL      MCH 30.7 pg      MCHC 33.7 g/dL      RDW 12.7 %      RDW-SD 41.9 fl      MPV 11.6 fL      Platelets 120 10*3/mm3      Neutrophil % 76.1 %      Lymphocyte % 10.5 %      Monocyte % 6.8 %      Eosinophil % 3.4 %      Basophil % 0.7 %      Immature Grans % 2.5 %      Neutrophils, Absolute 4.50 10*3/mm3      Lymphocytes, Absolute 0.62 10*3/mm3      Monocytes, Absolute 0.40 10*3/mm3      Eosinophils, Absolute 0.20 10*3/mm3      Basophils, Absolute 0.04 10*3/mm3      Immature Grans, Absolute 0.15 10*3/mm3      nRBC 0.0 /100 WBC     Bilirubin, Direct [[id number]]  (Normal) Collected: 10/03/21 0349    Specimen: Blood Updated: 10/03/21 0436     Bilirubin, Direct 0.2 mg/dL     aPTT [[id number]]  (Abnormal) Collected: 10/03/21 0349    Specimen: Blood Updated: 10/03/21 0437     PTT 44.3 seconds     Narrative:      PTT Heparin Therapeutic Range:  59 - 95 seconds      POC Glucose Once [[id number]]  (Abnormal) Collected: 10/03/21 0556    Specimen: Blood Updated: 10/03/21 0602     Glucose 252 mg/dL      Comment: Meter: [id number] : [id number] AUSTIN WHITE       Blood Gas, Arterial With Co-Ox [[id number]]  (Abnormal) Collected: 10/03/21 0803    Specimen: Arterial Blood Updated: 10/03/21 0807     Site Right Radial     Grover's Test Positive     pH, Arterial 7.411 pH units      pCO2, Arterial 39.4 mm Hg      pO2, Arterial 60.4 mm Hg      Comment: 84 Value below reference range        HCO3, Arterial 25.0 mmol/L      Base Excess, Arterial 0.4 mmol/L      O2 Saturation, Arterial 90.1 %      Comment: 84 Value below reference range        Hemoglobin, Blood Gas 8.7 g/dL      Comment: 84 Value below reference range        Hematocrit, Blood Gas 26.6 %      Comment: 84 Value  below reference range        Oxyhemoglobin 88.5 %      Comment: 84 Value below reference range        Methemoglobin 0.70 %      Carboxyhemoglobin 1.1 %      A-a Gradiant 409.4 mmHg      CO2 Content 26.2 mmol/L      Temperature 0.0 C      Barometric Pressure for Blood Gas 728 mmHg      Modality Ventilator     FIO2 75 %      Ventilator Mode VC/AC     Set Tidal Volume 440.00     Set Mech Resp Rate 29.0     PEEP 12.0     Note --     Collected by [id number]     Comment: Meter: [id number]     :  [id number]        pH, Temp Corrected --     pCO2, Temperature Corrected --     pO2, Temperature Corrected --    Vancomycin, Random [[id number]]  (Normal) Collected: 10/03/21 0812    Specimen: Blood Updated: 10/03/21 0904     Vancomycin Random 25.20 mcg/mL     Narrative:      Therapeutic Ranges for Vancomycin    Vancomycin Random   5.0-40.0 mcg/mL  Vancomycin Trough   5.0-20.0 mcg/mL  Vancomycin Peak     20.0-40.0 mcg/mL    Blood Gas, Arterial With Co-Ox [[id number]]  (Abnormal) Collected: 10/03/21 0907    Specimen: Arterial Blood Updated: 10/03/21 0910     Site Right Radial     Grover's Test Positive     pH, Arterial 7.397 pH units      pCO2, Arterial 40.7 mm Hg      pO2, Arterial 58.5 mm Hg      Comment: 84 Value below reference range        HCO3, Arterial 25.0 mmol/L      Base Excess, Arterial 0.1 mmol/L      O2 Saturation, Arterial 88.5 %      Comment: 84 Value below reference range        Hemoglobin, Blood Gas 8.9 g/dL      Comment: 84 Value below reference range        Hematocrit, Blood Gas 27.4 %      Comment: 84 Value below reference range        Oxyhemoglobin 87.0 %      Comment: 84 Value below reference range        Methemoglobin 0.70 %      Carboxyhemoglobin 1.0 %      A-a Gradiant 581.8 mmHg      CO2 Content 26.3 mmol/L      Temperature 0.0 C      Barometric Pressure for Blood Gas 728 mmHg      Modality Ventilator     FIO2 100 %      Ventilator Mode VC/AC     Set Tidal Volume 440.00     Set Mech Resp Rate 29.0      PEEP 16.0     Note --     Collected by [id number]     Comment: Meter: [id number]     :  [id number]        pH, Temp Corrected --     pCO2, Temperature Corrected --     pO2, Temperature Corrected --            Part of this note may be an electronic transcription/translation of spoken language to printed text using the Dragon Dictation System.

## 2021-10-03 NOTE — PROGRESS NOTES
Nephrology Progress Note      Subjective     Pt FIO2 has increased to 100% and still hypoxic    Objective       Vital signs :     Temp:  [97.5 °F (36.4 °C)-98.2 °F (36.8 °C)] 98.1 °F (36.7 °C)  Heart Rate:  [54-62] 56  Resp:  [29-32] 32  BP: ()/(56-85) 110/79  FiO2 (%):  [75 %-100 %] 100 %      Intake/Output Summary (Last 24 hours) at 10/3/2021 1032  Last data filed at 10/3/2021 0604  Gross per 24 hour   Intake 3342.9 ml   Output 700 ml   Net 2642.9 ml       Physical Exam:    General: intubated on MV  Heart: Tele reveals sinus rhythm.   Lungs: Respirations appear to be regular, even and unlabored with no signs of respiratory distress. No audible wheezing.    Abdomen: no distension  Extremities: trace  Skin: No visible bleeding, bruising, or rash.  Neurologic: sedated        Laboratory Data :     Albumin Albumin   Date Value Ref Range Status   10/03/2021 2.48 (L) 3.50 - 5.20 g/dL Final   10/02/2021 2.85 (L) 3.50 - 5.20 g/dL Final   10/01/2021 2.28 (L) 3.50 - 5.20 g/dL Final      Magnesium No results found for: MG       PTH               No results found for: PTH    CBC and coagulation:  Results from last 7 days   Lab Units 10/03/21  0349 10/02/21  0100 10/01/21  0432 09/30/21  0539 09/29/21  0408 09/28/21  1428 09/28/21  0213 09/27/21  0110   PROCALCITONIN ng/mL  --   --  1.40*  --  1.61*  --   --  2.26*   CRP mg/dL 4.65* 10.92* 10.11*   < > 9.31*  --    < > 23.64*   WBC 10*3/mm3 5.91 10.58 13.52*   < > 12.00*  --    < > 14.50*   HEMOGLOBIN g/dL 8.4* 8.9* 8.4*   < > 8.4*  --    < > 8.9*   HEMATOCRIT % 24.9* 26.7* 25.1*   < > 26.4*  --    < > 26.5*   MCV fL 90.9 92.1 93.0   < > 96.4  --    < > 91.4   MCHC g/dL 33.7 33.3 33.5   < > 31.8  --    < > 33.6   PLATELETS 10*3/mm3 120* 130* 139*   < > 143  --    < > 141   INR   --   --   --   --   --  0.91  --   --    D DIMER QUANT MCGFEU/mL 11.35*  --  17.05*  --  >20.00*  --   --  >20.00*    < > = values in this interval not displayed.     Acid/base balance:  Results  from last 7 days   Lab Units 10/03/21  0907 10/03/21  0803 10/02/21  0947   PH, ARTERIAL pH units 7.397 7.411 7.431   PO2 ART mm Hg 58.5* 60.4* 64.4*   PCO2, ARTERIAL mm Hg 40.7 39.4 39.9   HCO3 ART mmol/L 25.0 25.0 26.5*     Renal and electrolytes:  Results from last 7 days   Lab Units 10/03/21  0349 10/02/21  0100 10/01/21  0432 09/30/21  0539 09/30/21  0539 09/29/21  0408 09/29/21  0408   SODIUM mmol/L 125* 122* 127*  --  128*  --  132*   POTASSIUM mmol/L 4.0 4.0 4.0   < > 3.9   < > 4.7   CHLORIDE mmol/L 87* 84* 88*   < > 88*   < > 93*   CO2 mmol/L 22.7 20.7* 20.4*   < > 21.3*   < > 21.4*   BUN mg/dL 64* 42* 61*   < > 51*   < > 63*   CREATININE mg/dL 3.49* 2.56* 3.70*  --  3.31*  --  4.59*   EGFR IF NONAFRICN AM mL/min/1.73 19* 27* 18*   < > 20*   < > 14*   CALCIUM mg/dL 7.9* 7.7* 7.8*   < > 7.2*   < > 7.2*    < > = values in this interval not displayed.     Estimated Creatinine Clearance: 30.4 mL/min (A) (by C-G formula based on SCr of 3.49 mg/dL (H)).    Liver and pancreatic function:  Results from last 7 days   Lab Units 10/03/21  0349 10/02/21  0100 10/01/21  0432   ALBUMIN g/dL 2.48* 2.85* 2.28*   BILIRUBIN mg/dL 0.2 0.3 0.3   ALK PHOS U/L 82 136* 92   AST (SGOT) U/L 9 10 9   ALT (SGPT) U/L 5 6 6         Cardiac:      Liver and pancreatic function:  Results from last 7 days   Lab Units 10/03/21  0349 10/02/21  0100 10/01/21  0432   ALBUMIN g/dL 2.48* 2.85* 2.28*   BILIRUBIN mg/dL 0.2 0.3 0.3   ALK PHOS U/L 82 136* 92   AST (SGOT) U/L 9 10 9   ALT (SGPT) U/L 5 6 6       Medications :     artificial tears, , Both Eyes, Q4H  aspirin, 81 mg, Oral, Daily  atorvastatin, 20 mg, Oral, Nightly  chlorhexidine, 15 mL, Mouth/Throat, Q12H  dexamethasone, 6 mg, Intravenous, Q24H  insulin aspart, 0-14 Units, Subcutaneous, Q6H  insulin detemir, 7 Units, Subcutaneous, Q12H  meropenem, 500 mg, Intravenous, Q24H  nystatin, , Topical, Q12H  pantoprazole, 40 mg, Intravenous, Q12H  polyethylene glycol, 17 g, Oral,  Daily  senna-docusate sodium, 1 tablet, Oral, BID  sodium bicarbonate, 100 mEq, Intravenous, Once  sodium bicarbonate, 1,300 mg, Oral, TID  sodium chloride, 10 mL, Intravenous, Q12H  sodium chloride, 10 mL, Intravenous, Q12H  sodium chloride, 10 mL, Intravenous, Q12H  sodium chloride, 10 mL, Intravenous, Q12H  Vancomycin Pharmacy Intermittent Dosing, , Does not apply, Daily  voriconazole, 200 mg, Per G Tube, Q12H      dexmedetomidine, 0.2-1.5 mcg/kg/hr, Last Rate: 1.5 mcg/kg/hr (10/03/21 0647)  fentaNYL Citrate,   fentaNYL Citrate,   heparin (porcine), 9.8 Units/kg/hr, Last Rate: 20.8 Units/kg/hr (10/03/21 0552)  Midazolam 1 mg/mL 100mL NS, 1-10 mg/hr, Last Rate: 10 mg/hr (10/03/21 0909)  norepinephrine, 0.02-0.3 mcg/kg/min, Last Rate: 0.02 mcg/kg/min (10/03/21 0946)  propofol, 5-50 mcg/kg/min, Last Rate: 50 mcg/kg/min (10/03/21 0818)  vecuronium (NORCURON) infusion, 0.6-1.2 mcg/kg/min, Last Rate: 0.6 mcg/kg/min (10/02/21 1321)        Assessment/Plan     1. JACKY, oliguric started on dialysis on 9/20/21  3. Anion gap metabolic acidosis  4.  Proteinuria with hypocomplementemia but negative ANCA and negative YUDELKA  5. Hyponatremia mild likely hypervolumic  6.  Covid pneumonia  7.  ARDS    Pt worsening hypoxic respiratory failure is most likely due to COVID-19 related parenchymal lung disease less likely fluid overload. Other differential is PE and recommend CTP. No emergent indication for dialysis    JACKY likely due to ATN  Baseline Cr 0.7, admitted with 2.8  [place] urine, no obstruction on renal USG  -strict I/O  -avoid nephrotoxic agents, and adjust medications according to eGFR    Sb Sullivan MD  10/03/21  10:32 EDT

## 2021-10-04 NOTE — PROGRESS NOTES
[facility]         Dr Burrell Sayied   LOS: 19 days   Patient Care Team:  Rafal Casillas MD as PCP - General (Family Medicine)    Chief Complaint: Vent management with COVID-19 pneumonia ARDS DKA renal failure cavitary lung lesion    Interval History: Sedated intubated and paralyzed.  Currently on fentanyl propofol Versed Precedex for sedation.  Nimbex for paralytics.  Levophed and heparin drips also.  FiO2 100% with PEEP of 14.  Sats maintaining at 92%.  Since he was completely paralyzed yesterday he has not had any further episodes of desaturation.    REVIEW OF SYSTEMS:   Sedated intubated paralyzed    Ventilator/Non-Invasive Ventilation Settings (From admission, onward)     Start     Ordered    10/01/21 0729  Ventilator - AC/VC; (29); 50; 90%; 12; 440  Continuous     Comments: Changes made by MD   Question Answer Comment   Vent Mode AC/VC    Breath rate  29   FiO2 50    FiO2 titrate for Sp02% =/> 90%    PEEP 12    Tidal Volume 440        10/01/21 0728    09/29/21 1313  Ventilator - AC/VC; (29); 50; 90%; 12; 460  Continuous,   Status:  Canceled     Comments: Changes made by MD   Question Answer Comment   Vent Mode AC/VC    Breath rate  29   FiO2 50    FiO2 titrate for Sp02% =/> 90%    PEEP 12    Tidal Volume 460        09/29/21 1312    09/28/21 1749  Ventilator - AC/VC; (30); 80; 90%; 12; 440  Continuous,   Status:  Canceled     Comments: Changes made by MD   Question Answer Comment   Vent Mode AC/VC    Breath rate  30   FiO2 80    FiO2 titrate for Sp02% =/> 90%    PEEP 12    Tidal Volume 440        09/28/21 1750    09/28/21 0639  Ventilator - AC/PC; (28); 70; 90%; Other (see comments); 14; 15  Continuous,   Status:  Canceled     Question Answer Comment   Vent Mode AC/PC    Breath rate  28   FiO2 70    FiO2 titrate for Sp02% =/> 90%    PEEP Other (see comments)    PEEP: 14    Inspiratory Pressure 15        09/28/21 0639    09/21/21 1347  Ventilator - AC/PC; (24); 60; 90%; 12; 15  Continuous,    Status:  Canceled     Question Answer Comment   Vent Mode AC/PC    Breath rate  24   FiO2 60    FiO2 titrate for Sp02% =/> 90%    PEEP 12    Inspiratory Pressure 15        09/21/21 1346    09/18/21 1233  Ventilator - AC/PC; (14); 60; 90%; 10; 15  Continuous,   Status:  Canceled     Comments: Increase peep 10   Question Answer Comment   Vent Mode AC/PC    Breath rate  14   FiO2 60    FiO2 titrate for Sp02% =/> 90%    PEEP 10    Inspiratory Pressure 15        09/18/21 1233    09/18/21 0924  Ventilator - AC/PC; (14); 60; 90%; 8; 15  Continuous,   Status:  Canceled     Comments: Increase to 60% and decrease rate to 14, abg at 1200   Question Answer Comment   Vent Mode AC/PC    Breath rate  14   FiO2 60    FiO2 titrate for Sp02% =/> 90%    PEEP 8    Inspiratory Pressure 15        09/18/21 0923    09/18/21 0922  Ventilator - AC/PC; (4); 60; 90%; 8; 15  Continuous,   Status:  Canceled     Comments: Increase to 60% and decrease rate to 14, abg at 1200   Question Answer Comment   Vent Mode AC/PC    Breath rate  4   FiO2 60    FiO2 titrate for Sp02% =/> 90%    PEEP 8    Inspiratory Pressure 15        09/18/21 0922    09/16/21 1222  Ventilator - AC/PC; (18); 40; 8  Continuous,   Status:  Canceled     Question Answer Comment   Vent Mode AC/PC    Breath rate  18   FiO2 40    PEEP 8        09/16/21 1222    09/15/21 1652  Ventilator - AC/VC+; (28); 40; 8; 500  Continuous,   Status:  Canceled     Question Answer Comment   Vent Mode AC/VC+    Breath rate  28   FiO2 40    PEEP 8    Tidal Volume 500        09/15/21 1652    09/15/21 0834  Ventilator - AC/VC; (28); 90; 8; 500  Continuous,   Status:  Canceled     Question Answer Comment   Vent Mode AC/VC    Breath rate  28   FiO2 90    PEEP 8    Tidal Volume 500        09/15/21 0834                  Vital Signs  Temp:  [98.2 °F (36.8 °C)-98.4 °F (36.9 °C)] 98.3 °F (36.8 °C)  Heart Rate:  [61-80] 61  Resp:  [32] 32  BP: ()/(51-84) 96/56  FiO2 (%):  [90 %-100 %] 90 %    Intake/Output  "Summary (Last 24 hours) at 10/4/2021 1059  Last data filed at 10/4/2021 0547  Gross per 24 hour   Intake 3283.96 ml   Output 750 ml   Net 2533.96 ml     Flowsheet Rows      First Filed Value   Admission Height  175.3 cm (69\") Documented at 09/14/2021 1053   Admission Weight  95.3 kg (210 lb) Documented at 09/14/2021 1053          Physical Exam:  Patient is examined using the personal protective equipment as per guidelines from infection control for this particular patient as enacted.  Hand hygiene was performed before and after patient interaction.   General Appearance:   Sedated intubated paralyzed  Neck midline trachea, no thyromegaly   Lungs:    Diminished breath sounds equal on the vent    Heart:    Regular rhythm and normal rate, normal S1 and S2, no            murmur, no gallop, no rub, no click   Chest Wall:    No abnormalities observed   Abdomen:     Normal bowel sounds, no masses, no organomegaly, soft        nontender, nondistended, no guarding, no rebound                tenderness   Extremities:  Trace to 1+ edema, no cyanosis, no             redness  CNS sedated intubated paralyzed  Skin no rashes no nodules  Musculoskeletal no cyanosis no clubbing      Results Review:        Results from last 7 days   Lab Units 10/04/21  0140 10/03/21  0349 10/03/21  0349 10/02/21  0100 10/02/21  0100   SODIUM mmol/L 127*  --  125*  --  122*   POTASSIUM mmol/L 4.3  --  4.0  --  4.0   CHLORIDE mmol/L 89*  --  87*  --  84*   CO2 mmol/L 20.6*  --  22.7  --  20.7*   BUN mg/dL 76*  --  64*  --  42*   CREATININE mg/dL 3.92*  --  3.49*  --  2.56*   GLUCOSE mg/dL 203*   < > 209*   < > 251*   CALCIUM mg/dL 8.0*  --  7.9*  --  7.7*    < > = values in this interval not displayed.     Results from last 7 days   Lab Units 10/02/21  0749 10/01/21  0825   TROPONIN T ng/mL 0.056* 0.045*     Results from last 7 days   Lab Units 10/04/21  0140 10/03/21  0349 10/02/21  0100   WBC 10*3/mm3 7.64 5.91 10.58   HEMOGLOBIN g/dL 8.9* 8.4* 8.9* "   HEMATOCRIT % 27.3* 24.9* 26.7*   PLATELETS 10*3/mm3 125* 120* 130*     Results from last 7 days   Lab Units 10/04/21  0140 10/03/21  1848 10/03/21  1151 09/28/21 2002 09/28/21  1428   INR   --   --   --   --  0.91   APTT seconds 51.6* 99.4* 98.5*   < > 29.2    < > = values in this interval not displayed.                 Results from last 7 days   Lab Units 10/04/21  0440   PH, ARTERIAL pH units 7.326*   PO2 ART mm Hg 140.0*   PCO2, ARTERIAL mm Hg 43.2   HCO3 ART mmol/L 22.5       I reviewed the patient's new clinical results.  I personally viewed and interpreted the patient's chest x-ray.        Medication Review:   artificial tears, , Both Eyes, Q4H  aspirin, 81 mg, Oral, Daily  atorvastatin, 20 mg, Oral, Nightly  cefTRIAXone, 2 g, Intravenous, Q24H  chlorhexidine, 15 mL, Mouth/Throat, Q12H  clindamycin, 600 mg, Intravenous, Q8H  dexamethasone, 6 mg, Intravenous, Q24H  insulin aspart, 0-14 Units, Subcutaneous, Q6H  insulin detemir, 7 Units, Subcutaneous, Q12H  nystatin, , Topical, Q12H  pantoprazole, 40 mg, Intravenous, Q12H  polyethylene glycol, 17 g, Oral, Daily  senna-docusate sodium, 1 tablet, Oral, BID  sodium bicarbonate, 100 mEq, Intravenous, Once  sodium bicarbonate, 1,300 mg, Oral, TID  sodium chloride, 10 mL, Intravenous, Q12H  sodium chloride, 10 mL, Intravenous, Q12H  sodium chloride, 10 mL, Intravenous, Q12H  sodium chloride, 10 mL, Intravenous, Q12H  voriconazole, 200 mg, Per G Tube, Q12H        dexmedetomidine, 0.2-1.5 mcg/kg/hr, Last Rate: 1.5 mcg/kg/hr (10/04/21 0543)  fentaNYL Citrate,   fentaNYL Citrate,   heparin (porcine), 9.8 Units/kg/hr, Last Rate: 18.8 Units/kg/hr (10/04/21 0447)  Midazolam 1 mg/mL 100mL NS, 1-10 mg/hr, Last Rate: 10 mg/hr (10/04/21 0351)  norepinephrine, 0.02-0.3 mcg/kg/min, Last Rate: 0.02 mcg/kg/min (10/03/21 1306)  propofol, 5-50 mcg/kg/min, Last Rate: 50 mcg/kg/min (10/04/21 0800)  vecuronium (NORCURON) infusion, 0.6-1.2 mcg/kg/min, Last Rate: 1.2 mcg/kg/min  (10/04/21 8840)        ASSESSMENT:   1. Acute hypoxic and hypercapnic respiratory failure, on MV 9/14  2. COVID-19 pneumonia  3. Severe ARDS, PF ratio 97  4. RSV URTI  5. Hypotension, secondary sedation  6. Bacterial pneumonia (elevated procalcitonin) posterior consolidations  7. Severe Metabolic acidosis  8. Acute diabetic ketoacidosis, resolved  9. Acute renal failure  10. Acute pancreatitis, improved  11. Metabolic acidosis, secondary to renal failure  12. Poorly controlled DM - Hba1c - 11%  13. Candida albicans in sputum, probable contamination/colonization  14. Proteinuria  15. Propofol induced hypertriglyceridemia, mild  16. Anemia  17. Elevated d dimer -elevated - can certainly be seen in covid ards, neg studies so far, on heparin  18. Cavitary lung lesion     PLAN:  Respiratory status now more stable with heavy sedation and paralytics.  Continue with heavy sedated and paralyzed.   Wean down FiO2 to keep sats above 90%.  Once FiO2 at 60% requirement will start weaning down PEEP.  Repeat ABGs were reviewed and noted PO2 of 58 and vent were adjusted afterwards.  Once FiO2 60% or less we will try weaning off Nimbex drip first.  Cavitary lung lesion will need outpatient follow-up with repeat CT chest.  Antibiotics recommended for 3 weeks  Continue Decadron 6 mg daily and start weaning after day 10.  Discussed with nursing staff.  Patient critically ill  Will likely need trach and PEG down the road.      Critical care time 35 minutes          Indra Leblanc MD  10/04/21  10:59 EDT

## 2021-10-04 NOTE — CASE MANAGEMENT/SOCIAL WORK
Discharge Planning Assessment   [facility]     Patient Name: Martinez Mckenna  MRN: [id number]  Today's Date: 10/4/2021    Admit Date: 9/14/2021    Discharge Plan     Row Name 10/04/21 0918       Plan    Plan Pt admitted 9/14/21. Pt to remain in covid isolation until 10/5/21. Pt remains intbuated and sedated. Palliative following for GOC. Pt is from home and does not utilize home health services or DME. Per MD note, pt's family is considering comfort measures within the next 24 hours. SS following.        HERNESTO Duggan

## 2021-10-04 NOTE — PROGRESS NOTES
Nephrology Progress Note      Subjective     Pt remains intubated no MV    Objective       Vital signs :     Temp:  [98.2 °F (36.8 °C)-98.4 °F (36.9 °C)] 98.4 °F (36.9 °C)  Heart Rate:  [55-80] 65  Resp:  [32] 32  BP: ()/(51-96) 90/51  FiO2 (%):  [80 %-100 %] 100 %      Intake/Output Summary (Last 24 hours) at 10/4/2021 0808  Last data filed at 10/4/2021 0547  Gross per 24 hour   Intake 3283.96 ml   Output 750 ml   Net 2533.96 ml       Physical Exam:    General: intubated on MV  Heart: Tele reveals sinus rhythm.   Lungs: Respirations appear to be regular, even and unlabored with no signs of respiratory distress. No audible wheezing.    Abdomen: no distension  Extremities: trace  Skin: No visible bleeding, bruising, or rash.  Neurologic: sedated        Laboratory Data :     Albumin Albumin   Date Value Ref Range Status   10/04/2021 2.49 (L) 3.50 - 5.20 g/dL Final   10/03/2021 2.48 (L) 3.50 - 5.20 g/dL Final   10/02/2021 2.85 (L) 3.50 - 5.20 g/dL Final      Magnesium No results found for: MG       PTH               No results found for: PTH    CBC and coagulation:  Results from last 7 days   Lab Units 10/04/21  0140 10/03/21  0349 10/02/21  0100 10/01/21  0432 10/01/21  0432 09/30/21  0539 09/29/21  0408 09/28/21  1428 09/28/21  0213   PROCALCITONIN ng/mL  --  0.93*  --   --  1.40*  --  1.61*  --   --    CRP mg/dL 2.78* 4.65* 10.92*   < > 10.11*   < > 9.31*  --    < >   WBC 10*3/mm3 7.64 5.91 10.58   < > 13.52*   < > 12.00*  --    < >   HEMOGLOBIN g/dL 8.9* 8.4* 8.9*   < > 8.4*   < > 8.4*  --    < >   HEMATOCRIT % 27.3* 24.9* 26.7*   < > 25.1*   < > 26.4*  --    < >   MCV fL 91.9 90.9 92.1   < > 93.0   < > 96.4  --    < >   MCHC g/dL 32.6 33.7 33.3   < > 33.5   < > 31.8  --    < >   PLATELETS 10*3/mm3 125* 120* 130*   < > 139*   < > 143  --    < >   INR   --   --   --   --   --   --   --  0.91  --    D DIMER QUANT MCGFEU/mL  --  11.35*  --   --  17.05*  --  >20.00*  --   --     < > = values in this interval not  displayed.     Acid/base balance:  Results from last 7 days   Lab Units 10/04/21  0440 10/03/21  0907 10/03/21  0803   PH, ARTERIAL pH units 7.326* 7.397 7.411   PO2 ART mm Hg 140.0* 58.5* 60.4*   PCO2, ARTERIAL mm Hg 43.2 40.7 39.4   HCO3 ART mmol/L 22.5 25.0 25.0     Renal and electrolytes:  Results from last 7 days   Lab Units 10/04/21  0140 10/03/21  0349 10/02/21  0100 10/01/21  0432 10/01/21  0432 09/30/21  0539 09/30/21  0539   SODIUM mmol/L 127* 125* 122*  --  127*  --  128*   POTASSIUM mmol/L 4.3 4.0 4.0   < > 4.0   < > 3.9   CHLORIDE mmol/L 89* 87* 84*   < > 88*   < > 88*   CO2 mmol/L 20.6* 22.7 20.7*   < > 20.4*   < > 21.3*   BUN mg/dL 76* 64* 42*   < > 61*   < > 51*   CREATININE mg/dL 3.92* 3.49* 2.56*  --  3.70*  --  3.31*   EGFR IF NONAFRICN AM mL/min/1.73 16* 19* 27*   < > 18*   < > 20*   CALCIUM mg/dL 8.0* 7.9* 7.7*   < > 7.8*   < > 7.2*    < > = values in this interval not displayed.     Estimated Creatinine Clearance: 27.1 mL/min (A) (by C-G formula based on SCr of 3.92 mg/dL (H)).    Liver and pancreatic function:  Results from last 7 days   Lab Units 10/04/21  0140 10/03/21  0349 10/02/21  0100   ALBUMIN g/dL 2.49* 2.48* 2.85*   BILIRUBIN mg/dL 0.3 0.2 0.3   ALK PHOS U/L 85 82 136*   AST (SGOT) U/L 8 9 10   ALT (SGPT) U/L 6 5 6         Cardiac:      Liver and pancreatic function:  Results from last 7 days   Lab Units 10/04/21  0140 10/03/21  0349 10/02/21  0100   ALBUMIN g/dL 2.49* 2.48* 2.85*   BILIRUBIN mg/dL 0.3 0.2 0.3   ALK PHOS U/L 85 82 136*   AST (SGOT) U/L 8 9 10   ALT (SGPT) U/L 6 5 6       Medications :     artificial tears, , Both Eyes, Q4H  aspirin, 81 mg, Oral, Daily  atorvastatin, 20 mg, Oral, Nightly  chlorhexidine, 15 mL, Mouth/Throat, Q12H  dexamethasone, 6 mg, Intravenous, Q24H  insulin aspart, 0-14 Units, Subcutaneous, Q6H  insulin detemir, 7 Units, Subcutaneous, Q12H  meropenem, 500 mg, Intravenous, Q24H  nystatin, , Topical, Q12H  pantoprazole, 40 mg, Intravenous,  Q12H  polyethylene glycol, 17 g, Oral, Daily  senna-docusate sodium, 1 tablet, Oral, BID  sodium bicarbonate, 100 mEq, Intravenous, Once  sodium bicarbonate, 1,300 mg, Oral, TID  sodium chloride, 10 mL, Intravenous, Q12H  sodium chloride, 10 mL, Intravenous, Q12H  sodium chloride, 10 mL, Intravenous, Q12H  sodium chloride, 10 mL, Intravenous, Q12H  Vancomycin Pharmacy Intermittent Dosing, , Does not apply, Daily  voriconazole, 200 mg, Per G Tube, Q12H      dexmedetomidine, 0.2-1.5 mcg/kg/hr, Last Rate: 1.5 mcg/kg/hr (10/04/21 0546)  fentaNYL Citrate,   fentaNYL Citrate,   heparin (porcine), 9.8 Units/kg/hr, Last Rate: 18.8 Units/kg/hr (10/04/21 3217)  Midazolam 1 mg/mL 100mL NS, 1-10 mg/hr, Last Rate: 10 mg/hr (10/04/21 0351)  norepinephrine, 0.02-0.3 mcg/kg/min, Last Rate: 0.02 mcg/kg/min (10/03/21 1306)  propofol, 5-50 mcg/kg/min, Last Rate: 50 mcg/kg/min (10/04/21 0800)  vecuronium (NORCURON) infusion, 0.6-1.2 mcg/kg/min, Last Rate: 1.2 mcg/kg/min (10/04/21 0418)        Assessment/Plan     1. JCAKY, oliguric started on dialysis on 9/20/21  3. Anion gap metabolic acidosis  4.  Proteinuria with hypocomplementemia but negative ANCA and negative YUDELKA  5. Hyponatremia mild likely hypervolumic  6.  Covid pneumonia  7.  ARDS    Dialysis today and continue on IHDx as needed    JAKCY likely due to ATN  Baseline Cr 0.7, admitted with 2.8  Norco urine, no obstruction on renal USG  -strict I/O  -avoid nephrotoxic agents, and adjust medications according to eGFR    Sb Sullivan MD  10/04/21  08:08 EDT

## 2021-10-04 NOTE — PROGRESS NOTES
"Palliative Care Daily Progress Note     S: Medical record reviewed, followed up with Primary RN Lilly and Dr. Monte regarding patient's condition. Pts is in COVID Isolation, Patient is sedated on ventilator on vent and did not appear to be in distress through the CCU window.      O:   Palliative Performance Scale Score:     BP 95/58   Pulse 62   Temp 98.3 °F (36.8 °C) (Oral)   Resp (!) 32   Ht 175.3 cm (69.02\")   Wt 104 kg (228 lb 2.8 oz)   SpO2 93%   BMI 33.68 kg/m²     Intake/Output Summary (Last 24 hours) at 10/4/2021 1153  Last data filed at 10/4/2021 1134  Gross per 24 hour   Intake 3483.96 ml   Output 750 ml   Net 2733.96 ml       PE:  Covid restrictions      Meds: Reviewed and changes noted.    Labs:   Results from last 7 days   Lab Units 10/04/21  0140   WBC 10*3/mm3 7.64   HEMOGLOBIN g/dL 8.9*   HEMATOCRIT % 27.3*   PLATELETS 10*3/mm3 125*     Results from last 7 days   Lab Units 10/04/21  0140   SODIUM mmol/L 127*   POTASSIUM mmol/L 4.3   CHLORIDE mmol/L 89*   CO2 mmol/L 20.6*   BUN mg/dL 76*   CREATININE mg/dL 3.92*   GLUCOSE mg/dL 203*   CALCIUM mg/dL 8.0*     Results from last 7 days   Lab Units 10/04/21  0140   SODIUM mmol/L 127*   POTASSIUM mmol/L 4.3   CHLORIDE mmol/L 89*   CO2 mmol/L 20.6*   BUN mg/dL 76*   CREATININE mg/dL 3.92*   CALCIUM mg/dL 8.0*   BILIRUBIN mg/dL 0.3   ALK PHOS U/L 85   ALT (SGPT) U/L 6   AST (SGOT) U/L 8   GLUCOSE mg/dL 203*     Imaging Results (Last 72 Hours)     Procedure Component Value Units Date/Time    XR Chest 1 View [[id number]] Collected: 10/04/21 0051     Updated: 10/04/21 0053    Narrative:      CR Chest 1 Vw    INDICATION:   Possible aspiration. Covid 19.     COMPARISON:    10/3/2021 at 0854 hours    FINDINGS:  Single portable AP view(s) of the chest.    Heart size is stable. Endotracheal tube is in good position in the mid to lower trachea. NG tube tip in the stomach. Right IJ line tips are in the SVC. No pneumothorax is seen. Lung volumes remain quite " low. Infiltrates in both lungs are stable.       Impression:      Stable appearance of the chest.    Signer Name: Shawn Crow MD   Signed: 10/4/2021 12:51 AM   Workstation Name: [email or web address]    [facility]    XR Chest 1 View [[id number]] Collected: 10/03/21 0943     Updated: 10/03/21 0945    Narrative:      CR Chest 1 Vw    INDICATION:   Acute respiratory failure. Covid 19 pneumonia.     COMPARISON:    10/2/2021    FINDINGS:  Portable AP view(s) of the chest.    Support lines and tubes are unchanged.    Improved lung volumes with decreasing right upper lobe infiltrate. Continued left and right basilar infiltrates. No sizable effusions or pneumothorax. Heart and mediastinum unremarkable.       Impression:      Slightly improved lung volumes with decreasing right upper lobe parenchymal opacity which may reflect atelectasis.    Signer Name: DAILY Davis MD   Signed: 10/3/2021 9:43 AM   Workstation [facility]: [email or web address]    [facility]    XR Chest 1 View [[id number]] Collected: 10/02/21 0829     Updated: 10/02/21 0832    Narrative:      XR CHEST 1 VW-     CLINICAL INDICATION: intubated/ogt placement; E13.11-Other specified  diabetes mellitus with ketoacidosis with coma; J96.01-Acute respiratory  failure with hypoxia; U07.1-COVID-19; N17.9-Acute kidney failure,  unspecified; K85.90-Acute pancreatitis without necrosis or infection,  unspecified; K29.90-Gastroduodenitis, unspecified, without bleeding;  J15.9-Unspecified bacterial pneumonia; E83.39-Other disorders of  phosphorus        COMPARISON: 10/01/2021      TECHNIQUE: Single frontal view of the chest.     FINDINGS:     Left effusion and bilateral airspace disease  NG tube is in the stomach. Other lines are stable. The endotracheal tube  is 2 cm cephalad to the jono  There is no evidence of an acute osseous abnormality.   There are no suspicious-appearing parenchymal soft tissue nodules.          Impression:       "Little overall change     This report was finalized on 10/2/2021 8:30 AM by Dr. Jc Tovar MD.               Diagnostics: Reviewed    A: Martinez Mckenna is a 51 y.o. yo male in a unresponsive state. He has a past medical history of diabetes who presented via EMS unresponsive from home. Per EMS report obtained from patient's father, patient was diagnosed with COVID-19 pneumonia 1 week ago and has been unresponsive essentially since that time. Glucose reading was \"high\" in the field. Upon arrival to the ED his GCS was 3; he was unresponsive to painful stimuli. He had unobtainable BP but a palpable femoral pulse. He was critically hypotensive. He exhibited rapid shallow breathing. ED workup revealed DKA, hyperkalemia, JACKY, metabolic acidosis, and leukocytosis. Troponin was negative x2, while BNP was mildly elevated but this could be falsely high from renal failure. Lipase and amylase were also significantly elevated. CT imaging showed COVID-19 pneumonia with superimposed bacterial pneumonia, along with probable changes of pancreatitis, and inflammation surrounding pancreas. Patient is currently on precedex and fentanyl for sedation, along with IV insulin drip for DKA. Antibiotics have been added to cover for superimposed bacterial pneumonia mentioned on CT imaging. She has been admitted to the CCU fof further management.      Today 10/4/2021  Pt was afebrile, HR 62, BP of 95/58 is sedated on ventilator @90% Fio2 and 14 of PEEP, rate of 32 and was saturating 94% and did not appear to be in any distress.         P:  I was able to speak with patients sister Cari and father Román today for support as well as to update them on Martinez's condition. After a long discussion the decision was made to give the patient another day, tomorrow he is suppose to be out of isolation and I would like to get pts father and sister in for a visit and then discuss comfort measures. Patients father has been consistent in saying that Martinez " would not want to be on long term life support. I let Dr. Monte know of plan as well as CIARA Carr as well.      We will continue to follow along. Please do not hesitate to contact us regarding further sx mgmt or GOC needs, including after hours or on weekends via our on call provider at [phone].     Dora Fleming, APRN    10/4/2021

## 2021-10-04 NOTE — PROGRESS NOTES
[facility] HOSPITALIST PROGRESS NOTE     Patient Identification:  Name:  Martinez Mckenna  Age:  51 y.o.  Sex:  male  :  1970  MRN:  [id number]  Visit Number:  [id number]  ROOM: [address]     Primary Care Provider:  Rafal Casillas MD    Length of stay in inpatient status:  19    Subjective     Chief Compliant:    Chief Complaint   Patient presents with   • Respiratory Distress       History of Presenting Illness:    No events overnight. Patient remains sedated, intubated, paralyzed. Patient net positive around 5L last two days. Nephrology planning on dialysis today. Palliative care to discuss GOC with family who are considering comfort measures.     ROS:  Otherwise 10 point ROS negative other than documented above in HPI.     Objective     Current Hospital Meds:artificial tears, , Both Eyes, Q4H  aspirin, 81 mg, Oral, Daily  atorvastatin, 20 mg, Oral, Nightly  cefTRIAXone, 2 g, Intravenous, Q24H  chlorhexidine, 15 mL, Mouth/Throat, Q12H  clindamycin, 600 mg, Intravenous, Q8H  dexamethasone, 6 mg, Intravenous, Q24H  insulin aspart, 0-14 Units, Subcutaneous, Q6H  insulin detemir, 7 Units, Subcutaneous, Q12H  nystatin, , Topical, Q12H  pantoprazole, 40 mg, Intravenous, Q12H  polyethylene glycol, 17 g, Oral, Daily  senna-docusate sodium, 1 tablet, Oral, BID  sodium bicarbonate, 100 mEq, Intravenous, Once  sodium bicarbonate, 1,300 mg, Oral, TID  sodium chloride, 10 mL, Intravenous, Q12H  sodium chloride, 10 mL, Intravenous, Q12H  sodium chloride, 10 mL, Intravenous, Q12H  sodium chloride, 10 mL, Intravenous, Q12H  voriconazole, 200 mg, Per G Tube, Q12H    dexmedetomidine, 0.2-1.5 mcg/kg/hr, Last Rate: 1.5 mcg/kg/hr (10/04/21 2538)  fentaNYL Citrate,   fentaNYL Citrate,   heparin (porcine), 9.8 Units/kg/hr, Last Rate: 18.8 Units/kg/hr (10/04/21 3685)  Midazolam 1 mg/mL 100mL NS, 1-10 mg/hr, Last Rate: 10 mg/hr (10/04/21 4435)  norepinephrine, 0.02-0.3 mcg/kg/min, Last Rate: 0.02 mcg/kg/min (10/03/21  1306)  propofol, 5-50 mcg/kg/min, Last Rate: 50 mcg/kg/min (10/04/21 1134)  vecuronium (NORCURON) infusion, 0.6-1.2 mcg/kg/min, Last Rate: 1.2 mcg/kg/min (10/04/21 0418)        Current Antimicrobial Therapy:  Anti-Infectives (From admission, onward)    Ordered     Dose/Rate Route Frequency Start Stop    10/04/21 0910  cefTRIAXone (ROCEPHIN) 2 g in sodium chloride 0.9 % 100 mL IVPB-VTB     Ordering Provider: Chloe Garvey MD    2 g  200 mL/hr over 30 Minutes Intravenous Every 24 Hours 10/04/21 1200 10/14/21 1159    10/04/21 0910  clindamycin (CLEOCIN) 600 mg in dextrose 5% 50 mL IVPB (premix)     Ordering Provider: Chloe Garvey MD    600 mg  50 mL/hr over 60 Minutes Intravenous Every 8 Hours 10/04/21 1100 10/14/21 1059    09/30/21 1426  voriconazole (VFEND) tablet 200 mg     Mariana Macias APRN reviewed the order on 10/02/21 1325.   Ordering Provider: Chloe Garvey MD    200 mg Per G Tube Every 12 Hours 10/01/21 1800 10/21/21 1759    10/01/21 1709  vancomycin 1 g/250 mL 0.9% NS (vial-mate)     Ordering Provider: Chloe Garvey MD    1,000 mg  over 60 Minutes Intravenous Once 10/01/21 1800 10/01/21 1850    09/30/21 1426  voriconazole (VFEND) 500 mg in dextrose (D5W) 5 % 100 mL IVPB     Ordering Provider: Chloe Garvey MD    6 mg/kg × 83.2 kg (Adjusted)  over 60 Minutes Intravenous Every 12 Hours 09/30/21 1600 10/01/21 0520    09/29/21 1501  vancomycin 1 g/250 mL 0.9% NS (vial-mate)     Ordering Provider: Kieran Rene MD    1,000 mg  over 60 Minutes Intravenous Once 09/29/21 1600 09/29/21 1859    09/27/21 1300  vancomycin 1 g/250 mL 0.9% NS (vial-mate)     Ordering Provider: Kieran Rene MD    1,000 mg  over 60 Minutes Intravenous Once 09/27/21 1500 09/27/21 1813    09/26/21 1426  Vancomycin Pharmacy Intermittent Dosing     Ordering Provider: Jaqui Tapia PA     Does not apply Daily 09/26/21 1515 10/01/21 0859    09/26/21 1408  meropenem (MERREM) 500 mg in sodium chloride  0.9 % 100 mL IVPB-VTB     Ordering Provider: Jaqui Tapia PA    500 mg  over 3 Hours Intravenous Every 24 Hours 09/26/21 1500 09/30/21 1739    09/23/21 1302  vancomycin 1 g/250 mL 0.9% NS (vial-mate)     Ordering Provider: Chloe Garvey MD    1,000 mg  over 60 Minutes Intravenous Once 09/23/21 1400 09/23/21 1551    09/21/21 1259  meropenem (MERREM) 500 mg in sodium chloride 0.9 % 100 mL IVPB-VTB     Ordering Provider: Chloe Garvey MD    500 mg  over 3 Hours Intravenous Every 24 Hours 09/22/21 1400 09/26/21 1707    09/21/21 1257  vancomycin 1 g/250 mL 0.9% NS (vial-mate)     Ordering Provider: Chloe Garvey MD    1,000 mg  over 60 Minutes Intravenous Once 09/21/21 1400 09/21/21 1735    09/19/21 0947  Vancomycin Pharmacy Intermittent Dosing     Ordering Provider: Chloe Garvey MD     Does not apply Daily 09/20/21 0900 09/26/21 0859    09/19/21 0947  vancomycin 1750 mg/500 mL 0.9% NS IVPB ([facility])     Ordering Provider: Chloe Garvey MD    20 mg/kg × 90.1 kg  over 120 Minutes Intravenous Once 09/19/21 1200 09/19/21 1454    09/19/21 0947  meropenem (MERREM) 500 mg in sodium chloride 0.9 % 100 mL IVPB-VTB     Ordering Provider: Chloe Garvey MD    500 mg  over 30 Minutes Intravenous Once 09/19/21 1200 09/19/21 1325    09/15/21 0443  remdesivir 100 mg in 270 mL NS     Ordering Provider: Adama Brown MD    100 mg  over 60 Minutes Intravenous Every 24 Hours 09/16/21 0600 09/19/21 1013    09/15/21 0443  remdesivir 200 mg in 290 mL NS     Ordering Provider: Adama Brown MD    200 mg  over 60 Minutes Intravenous Every 24 Hours 09/15/21 0600 09/15/21 0742    09/14/21 1912  vancomycin 2000 mg/500 mL 0.9% NS IVPB ([facility])     Ordering Provider: Gilmer Avitia MD    20 mg/kg × 95.3 kg  over 120 Minutes Intravenous Once 09/14/21 2000 09/14/21 2240 09/14/21 1950  piperacillin-tazobactam (ZOSYN) IVPB 4.5 g in 100 mL NS VTB     Ordering Provider: Sadi  Gilmer Lemus MD    4.5 g  over 30 Minutes Intravenous Once 09/14/21 1952 09/14/21 2054 09/14/21 1101  piperacillin-tazobactam (ZOSYN) IVPB 3.375 g in 100 mL NS VTB     Ordering Provider: James Interiano MD    3.375 g  over 30 Minutes Intravenous Once 09/14/21 1103 09/14/21 1202        Current Diuretic Therapy:  Diuretics (From admission, onward)    Ordered     Dose/Rate Route Frequency Start Stop    09/17/21 1314  furosemide (LASIX) injection 80 mg     Ordering Provider: Sb Sullivan MD    80 mg Intravenous Once 09/17/21 1400 09/17/21 1343        ----------------------------------------------------------------------------------------------------------------------  Vital Signs:  Temp:  [98.2 °F (36.8 °C)-98.4 °F (36.9 °C)] 98.3 °F (36.8 °C)  Heart Rate:  [61-80] 62  Resp:  [32] 32  BP: ()/(51-84) 95/58  FiO2 (%):  [90 %-100 %] 90 %  SpO2:  [90 %-100 %] 93 %  on   ;   Device (Oxygen Therapy): ventilator  Body mass index is 33.68 kg/m².    Wt Readings from Last 3 Encounters:   10/04/21 104 kg (228 lb 2.8 oz)   08/04/21 95.3 kg (210 lb)     Intake & Output (last 3 days)       10/01 0701 - 10/02 0700 10/02 0701 - 10/03 0700 10/03 0701 - 10/04 0700 10/04 0701 - 10/05 0700    I.V. (mL/kg) 2418.8 (23.3) 2295.9 (22.1) 2448 (23.8)     Other 30 50 60     NG/ 897 676     IV Piggyback 350 100 100 200    Total Intake(mL/kg) 3248.8 (31.2) 3342.9 (32.1) 3284 (31.9) 200 (1.9)    Urine (mL/kg/hr) 550 (0.2) 700 (0.3) 750 (0.3)     Emesis/NG output        Other 4000       Stool 0  0     Total Output 4550 700 750     Net -1301.2 +2642.9 +2534 +200            Stool Unmeasured Occurrence 1 x  1 x         NPO Diet  ----------------------------------------------------------------------------------------------------------------------  Physical exam:  GENERAL:  Patient intubated and sedated.   SKIN:  Warm, dry without rashes, purpura or petechiae.  EYES:  Pupils equal, round and reactive to light.  EOMs intact.   Conjunctivae normal.  HEAD:  Normocephalic. Atraumatic.   EARS/NOSE/MOUTH/THROAT:  Hearing intact. Septum midline.  No excoriations or nasal discharge. No stomatitis or ulcers.  Lips normal. Oropharynx without lesions or exudates.  NECK:  Supple with good range of motion; no thyromegaly or masses  LYMPHATICS:  No cervical, supraclavicular, axillary adenopathy.  RESP:  Lungs clear to auscultation. Good airflow. Intubated receiving mechanical ventilation.   CARDIAC:  Regular rate and rhythm without murmurs, rubs or gallops. Normal S1,S2. No edema  GI:  Soft, nontender, no hepatosplenomegaly, normal bowel sounds  MSK:  No clubbing or cyanosis, No joint swelling noted in hands  NEUROLOGICAL:  No focal deficit. Pupils equal and reactive.   ----------------------------------------------------------------------------------------------------------------------  Tele:    ----------------------------------------------------------------------------------------------------------------------  Results from last 7 days   Lab Units 10/04/21  0140 10/03/21  0349 10/02/21  0100 09/29/21  0408 09/28/21  1428   CRP mg/dL 2.78* 4.65* 10.92*   < >  --    WBC 10*3/mm3 7.64 5.91 10.58   < >  --    HEMOGLOBIN g/dL 8.9* 8.4* 8.9*   < >  --    HEMATOCRIT % 27.3* 24.9* 26.7*   < >  --    MCV fL 91.9 90.9 92.1   < >  --    MCHC g/dL 32.6 33.7 33.3   < >  --    PLATELETS 10*3/mm3 125* 120* 130*   < >  --    INR   --   --   --   --  0.91    < > = values in this interval not displayed.     Results from last 7 days   Lab Units 10/04/21  0440   PH, ARTERIAL pH units 7.326*   PO2 ART mm Hg 140.0*   PCO2, ARTERIAL mm Hg 43.2   HCO3 ART mmol/L 22.5     Results from last 7 days   Lab Units 10/04/21  0140 10/03/21  0349 10/02/21  0100   SODIUM mmol/L 127* 125* 122*   POTASSIUM mmol/L 4.3 4.0 4.0   CHLORIDE mmol/L 89* 87* 84*   CO2 mmol/L 20.6* 22.7 20.7*   BUN mg/dL 76* 64* 42*   CREATININE mg/dL 3.92* 3.49* 2.56*   EGFR IF NONAFRICN AM mL/min/1.73 16*  19* 27*   CALCIUM mg/dL 8.0* 7.9* 7.7*   GLUCOSE mg/dL 203* 209* 251*   ALBUMIN g/dL 2.49* 2.48* 2.85*   BILIRUBIN mg/dL 0.3 0.2 0.3   ALK PHOS U/L 85 82 136*   AST (SGOT) U/L 8 9 10   ALT (SGPT) U/L 6 5 6   Estimated Creatinine Clearance: 27.1 mL/min (A) (by C-G formula based on SCr of 3.92 mg/dL (H)).  No results found for: AMMONIA  Results from last 7 days   Lab Units 10/02/21  0749 10/01/21  0825   TROPONIN T ng/mL 0.056* 0.045*             Glucose   Date/Time Value Ref Range Status   10/04/2021 1122 224 (H) 70 - 130 mg/dL Final     Comment:     Meter: [id number] : [id number] Betsy Kurtz   10/04/2021 0541 182 (H) 70 - 130 mg/dL Final     Comment:     Meter: [id number] : [id number] AUSTIN WHITE   10/04/2021 0114 205 (H) 70 - 130 mg/dL Final     Comment:     Meter: [id number] : [id number] AUSTIN WHITE   10/03/2021 2246 241 (H) 70 - 130 mg/dL Final     Comment:     Meter: [id number] : [id number] AUSTIN WHITE   10/03/2021 1742 230 (H) 70 - 130 mg/dL Final     Comment:     Meter: [id number] : [id number] Michelle Lacey   10/03/2021 1143 231 (H) 70 - 130 mg/dL Final     Comment:     Meter: [id number] : [id number] Michelle Lacey   10/03/2021 0556 252 (H) 70 - 130 mg/dL Final     Comment:     Meter: [id number] : [id number] AUSTIN WHITE   10/03/2021 0036 206 (H) 70 - 130 mg/dL Final     Comment:     Meter: [id number] : [id number] AUSTIN WHITE     Lab Results   Component Value Date    TSH 0.761 09/14/2021    FREET4 1.08 09/14/2021     No results found for: PREGTESTUR, PREGSERUM, HCG, HCGQUANT  Pain Management Panel    There is no flowsheet data to display.       Brief Urine Lab Results  (Last result in the past 365 days)      Color   Clarity   Blood   Leuk Est   Nitrite   Protein   CREAT   Urine HCG        09/14/21 1108 Yellow Clear Small (1+) Negative Negative 100 mg/dL (2+)             No results found for: BLOODCX  No results found for: URINECX  No results found for: WOUNDCX  No results found  for: STOOLCX  Respiratory Culture   Date Value Ref Range Status   09/28/2021 Light growth (2+) Yeast, Not Candida albicans (A)  Final   09/28/2021 No Normal Respiratory Lanette (A)  Final     No results found for: AFBCX  Results from last 7 days   Lab Units 10/04/21  0140 10/03/21  0349 10/02/21  0100 10/01/21  0432 09/30/21  0539 09/29/21  0408 09/28/21  0213   PROCALCITONIN ng/mL  --  0.93*  --  1.40*  --  1.61*  --    CRP mg/dL 2.78* 4.65* 10.92* 10.11* 10.58* 9.31* 10.90*       I have personally looked at the labs and they are summarized above.  ----------------------------------------------------------------------------------------------------------------------  Detailed radiology reports for the last 24 hours:    Imaging Results (Last 24 Hours)     Procedure Component Value Units Date/Time    XR Chest 1 View [[id number]] Collected: 10/04/21 0051     Updated: 10/04/21 0053    Narrative:      CR Chest 1 Vw    INDICATION:   Possible aspiration. Covid 19.     COMPARISON:    10/3/2021 at 0854 hours    FINDINGS:  Single portable AP view(s) of the chest.    Heart size is stable. Endotracheal tube is in good position in the mid to lower trachea. NG tube tip in the stomach. Right IJ line tips are in the SVC. No pneumothorax is seen. Lung volumes remain quite low. Infiltrates in both lungs are stable.       Impression:      Stable appearance of the chest.    Signer Name: Shawn Crow MD   Signed: 10/4/2021 12:51 AM   Workstation Name: [id number]    [facility]        Assessment & Plan    51M PM DM Type II, reportedly tested positive for Covid 19 1 week prior, brought in unresponsive per EMS.     #Severe Sepsis/Septic Shock, Acute Metabolic Encephalopathy & Acute Hypoxic Respiratory Failure requiring intubation and mechanical ventilation 2/2 PNA, Viral, treating for Covid 19 & RSV c/b ARDS and pneumomediastinum but also covering for PNA, Bacterial, treating for MDR Organisms, also treating for  "PNA, Fungal w/ Candida sp. But now w/ c/f opportunistic infection w/ development of cavitary lesion RUL  - Patient presented w/ to ER via EMS and unresponsive at home, reportedly diagnosed w/ Covid 19 1 week prior, Glc read as \"high\" in the field, intubated upon arrival to ER 9/14.  HR > 90, RR > 20, WBC count > 12K, hypotensive requiring IVFs and pressors, covid +, PNA confirmed on imaging.  Has been admitted and intubated for prolonged period of time, since 9/14, has been on broad spectrum Abx and antifungal therapy but w/ c/f worsening infection, imaging now w/ c/f RUL cavitary lesion.   - Admission labs showed WBC count 16K, CRP 2.12, lactate 6.1, procal 0.35->1.37, Covid 19 +, RSV +, ABG 6.9/23/46, Resp Cx growing candida sp, MRSA negative, Bcx's NGTD, repeat Bcx's 9/27 NGTD  - AFB Cx's pending, Resp culture growing yeast not candida sp  - Echo showed LVEF 56-60%, diastolic dysfunction, otherwise unremarkable   - B/l Venous Duplex negative for DVT  - CT Head showed no acute intracranial abnormality, atrophy and CSVID  - CT Chest w/o contrast showed B/l patchy GGO's typical for Covid 19, moderate disease, dense consolidation b/l LL's; repeat CTPE 9/26 showed no PE, pneumomediastinum, significant b/l GGO's w/ LL consolidation and new cavitary process w/ filling defect in RUL   - VQ scan (perfusion only) showed low suspicion for PE  - ID consulted; Following, s/p Remdesivir  - Pulm consulted; Following, rec'd 3 weeks Abx, repeat imaging 6-8 weeks   - Continue Vanc, Meropenem and have extended dosing today for 3 weeks total since cavitary lesion noted on 9/26, continue Voriconazole as per ID given elevated Aspergillus galactomannan Ag  - Continue extended course of Dexamethasone now daily  - Continue Heparin gtt given d-dimer > 20 though no definitive thrombus identified, covering for microvascular thormbi as well, caution given underlying cavitary lung lesion  - Continue Albuterol Inhaler PRN  - Continue IV " pressors to keep MAPs > 65 or SBP > 90, off pressors this AM  - Continue pain and sedation gtt's as indicated  - Continue IV PPI for stress ulcer PPx  - Reviewed AM ABG and increased Fi02 due to mildly low P02, otherwise stable, repeat CXR little overall changed today  - Trend Covid 19 progression labs w/ CBC, CMP, CK, CRP, Ferritin daily and LDH, D-dimer, Fibrinogen q48hrs.  - Monitor in CCU, enhanced droplet/contact isolation precautions, continue 02 but wean as able, SAT/SBT when appropriate, palliative care following for C conversations  - ABG this AMN 7.326/43/140 after hypoxia worsened yesterday. Patient wasw on 100% FiO2, PEEP of 14. Patient turned down to 90% on my evaluation with SpO2 92%. Pplateau 34.   - CTPE negative on 9/26, D-dimer has trended down, patient not tachycardic, and patient has been on heparin gtt. Will hold repeat CTPE for now.     #RUL cavitary lesion  - Galactomanan antibody positive, quantiferon gold indeterminate   - Pulmonology following. ID following.   - Continue antifungal coverage as above   - Will order AFBX3 from secretions obtained from ET tube. Yield probably less than BAL but does not appear bronchoscopy recommended.   - Repeat CT chest around 10/11.      #Elevated Troponins  - Labs showed trop 0.045, previously NML on admission, up 10/2 at 0.056  - EKG showed some c/f nonspecific ST/T wave abnormality overnight  - Echo previously unremarkable; Repeating limited echo  - Cards consulted; Following, rec'd heparin, ASA, plavix, statin  - On Heparin as per above, have started on ASA 81, holding on plavix for now given cavitary lung lesion and would be on triple therapy w/ increased likelihood to bleed, have started on low dose statin due to receiving Voriconizole which can increase concentrations, trending LFTs  - Continue to monitor on tele, trend trops, trend HR and BP    #Nonoliguric -> Oliguric JACKY 2/2 Sepsis/DKA w/ suspected ATN now on intermittent HD  - B/l Cr 0.7, was  2.8 on admission, trended up to 7.0 on 9/20. Renal US showed unremarkable kidney's, no obstruction noted  - Nephrology consulted; Following for iHD needs, some improved UOP     #Acute Pancreatitis, unclear etiology  - Admission labs showed WBC count 16K, CRP 2.12, Lipase > 3000, lactate 6.1, AST mild elevation, Tbili NML, TG's 136, YUDELKA negative, RF NML, Anti-Smith Ab negative, Anti-DS DNA NML, SSA/SSB NML, C3 mildly low, C4 NML, complement borderline low, IGG4 NML, Bcx's NGTD.  CT showed inflammation surrounding the pancreas as well as gastric antrum and first portion of duodenum favoring acute pancreatitis w/ secondary inflammation of GI tract though also considered gastroduodenitis. GB US showed no evidence of cholelithiasis.  Given serum lipase > 3x ULN, characteristic findings on imaging, patient meets criteria for acute pancreatitis diagnosis.  Patient does have ongoing organ failure which is multifactorial, local complication of secondary GI tract inflammation and could be considered severe disease but truly is multifactorial at this point.  Etiology remains unclear, repeat CTPE 9/26 still showing marked abnormalities upper abdomen suspicious for pancreatitis.     #DKA c/b Acute Encephalopathy/DM Coma, Severe Systemic Acidosis in setting of NIDDM Type II, uncontrolled, now IDDM Type II - DKA Resolved  - Hgb A1c = 11.2%, no home oral agents or SQ insulin per meds rec, given severe encephalopathy/coma on presentation required intubation for airway protection. Admission labs showed Glc up to 1100, AG 39, Bicarb 2.8, Acetone moderate, ABG 6.9/23/46, K 4.5, lactate 6.1; Continue FSBG and SSI, continue Levemir 7U qhs and titrate as indicated    #Electrolyte Abnormalities  - Acute Mild Hyperkalemia - Likely related to renal failure, K up to 5.5, Resolved  - Borderline Hypomagnesemia - Mg down to 1.6, Replaced per protocol, Resolved   - Acute Mild Hypovolemic Hypernatremia (POA & Not POA) - Na up to 152 on admission,  resolved then became hyponatremic, today Na improved at 127, dialysate per nephrology   - Acute Mild/Mod Hypocalcemia -  Replacing, on protocol. Corrected calcium 8.3.   - Acute Mild Hypophosphatemia - Phos as low as 2.0, Replaced per protocol, Resolved    #Anxiety/Depression  - Holding home Venlafaxine while intubated    F: TFs  A: Fentanyl  S: Propofol, versed, precedex  T: Heparin gtt  H: HOB elevated   U: Pantoprazole  G: SSI, basal  S: Not ready  B: Doc-senna and miralax  I: RIJ 9/14, Hemodialysis catheter 9/21  D: Vanc, meropenem, voriconazole    Code status: DNR/DNI. Grim Prognosis. SOFA score currently 13. Palliative care consulted.      Dispo: Continue CCU level care     I have spent 40 minutes of critical care time  with > 50% of time spent in direct patient care, evaluating the patient at bedside, reviewing all labs and images, reviewing ABG and adjusting vent settings, reviewing pain and sedation gtt's, reviewing pressor requirement, communicating plan of care w/ nursing staff, utilizing high complexity medical decision making to assess and treat vital organ system failure in an individual who has impairment of one or more vital organ systems such that there is a high probability of imminent or life threatening deterioration in the patient’s condition. Failure to initiate the above interventions on an urgent basis would likely result in sudden, clinically significant or life threatening deterioration in the patient's condition.        VTE Prophylaxis:   Mechanical Order History:     None      Pharmalogical Order History:      Ordered     Dose Route Frequency Stop    09/28/21 1239  heparin (porcine) 5000 UNIT/ML injection 5,000 Units      5,000 Units IV Once 09/28/21 1439    09/28/21 1239  heparin 66535 units/250 mL (100 units/mL) in 0.45 % NaCl infusion  9.99 mL/hr      9.8 Units/kg/hr IV Titrated --    09/28/21 1239  heparin (porcine) 5000 UNIT/ML injection 5,000 Units      5,000 Units IV As Needed --     09/28/21 1239  heparin (porcine) 5000 UNIT/ML injection 2,500 Units      2,500 Units IV As Needed --    09/23/21 1512  heparin (porcine) 5000 UNIT/ML injection 5,000 Units  Status:  Discontinued      5,000 Units SC Every 8 Hours Scheduled 09/28/21 1239    09/15/21 0440  heparin (porcine) 5000 UNIT/ML injection 5,000 Units      5,000 Units IV Once 09/15/21 0621    09/15/21 0440  heparin 23843 units/250 mL (100 units/mL) in 0.45 % NaCl infusion  8.73 mL/hr,   Status:  Discontinued      10 Units/kg/hr IV Titrated 09/23/21 1512    09/15/21 0440  heparin (porcine) 5000 UNIT/ML injection 5,000 Units  Status:  Discontinued      5,000 Units IV As Needed 09/23/21 1512    09/15/21 0440  heparin (porcine) 5000 UNIT/ML injection 2,500 Units  Status:  Discontinued      2,500 Units IV As Needed 09/23/21 1512                Keegan Monte MD  [facility]  10/04/21  11:59 EDT

## 2021-10-04 NOTE — PROGRESS NOTES
[facility] Physician - Brief Progress Note  PERMANENT  10/03/2021 23:18    [facility] [place] [facility] [place] ([facility]    LAURA DELROY    Date of Service 10/03/2021 23:18    HPI/Events of Note Per RN request, ordered cxr after OGT replacement.      Interventions Intermediate-Communication with other healthcare providers and/or family        Electronically Signed by: Milli Rebolledo) on 10/03/2021 23:19

## 2021-10-04 NOTE — PROGRESS NOTES
PROGRESS NOTE         Patient Identification:  Name:  Martinez Mckenna  Age:  51 y.o.  Sex:  male  :  1970  MRN:  [id number]  Visit Number:  [id number]  Primary Care Provider:  Rafal Casillas MD         LOS: 19 days       ----------------------------------------------------------------------------------------------------------------------  Subjective       Chief Complaints:    Respiratory Distress        Interval History:      Patient remains sedated and intubated at 100% FiO2.  Afebrile.  CRP improved to 2.78.  White count normal at 7.64.  Chest x-ray from 10/4/2021 reports stable appearance of the chest.      Review of Systems:    Unable to obtain.  Intubated and sedated.  ----------------------------------------------------------------------------------------------------------------------      Objective       Current Hospital Meds:  artificial tears, , Both Eyes, Q4H  aspirin, 81 mg, Oral, Daily  atorvastatin, 20 mg, Oral, Nightly  cefTRIAXone, 2 g, Intravenous, Q24H  chlorhexidine, 15 mL, Mouth/Throat, Q12H  clindamycin, 600 mg, Intravenous, Q8H  dexamethasone, 6 mg, Intravenous, Q24H  insulin aspart, 0-14 Units, Subcutaneous, Q6H  insulin detemir, 7 Units, Subcutaneous, Q12H  nystatin, , Topical, Q12H  pantoprazole, 40 mg, Intravenous, Q12H  polyethylene glycol, 17 g, Oral, Daily  senna-docusate sodium, 1 tablet, Oral, BID  sodium bicarbonate, 100 mEq, Intravenous, Once  sodium bicarbonate, 1,300 mg, Oral, TID  sodium chloride, 10 mL, Intravenous, Q12H  sodium chloride, 10 mL, Intravenous, Q12H  sodium chloride, 10 mL, Intravenous, Q12H  sodium chloride, 10 mL, Intravenous, Q12H  voriconazole, 200 mg, Per G Tube, Q12H      dexmedetomidine, 0.2-1.5 mcg/kg/hr, Last Rate: 1.5 mcg/kg/hr (10/04/21 0561)  fentaNYL Citrate,   fentaNYL Citrate,   heparin (porcine), 9.8 Units/kg/hr, Last Rate: 18.8 Units/kg/hr (10/04/21 1837)  Midazolam 1 mg/mL 100mL NS, 1-10 mg/hr, Last Rate: 10 mg/hr (10/04/21  0351)  norepinephrine, 0.02-0.3 mcg/kg/min, Last Rate: 0.02 mcg/kg/min (10/03/21 1306)  propofol, 5-50 mcg/kg/min, Last Rate: 50 mcg/kg/min (10/04/21 0800)  vecuronium (NORCURON) infusion, 0.6-1.2 mcg/kg/min, Last Rate: 1.2 mcg/kg/min (10/04/21 0418)      ----------------------------------------------------------------------------------------------------------------------    Vital Signs:  Temp:  [98.2 °F (36.8 °C)-98.4 °F (36.9 °C)] 98.3 °F (36.8 °C)  Heart Rate:  [61-80] 62  Resp:  [32] 32  BP: ()/(51-84) 95/58  FiO2 (%):  [90 %-100 %] 90 %  Mean Arterial Pressure (Non-Invasive) for the past 24 hrs (Last 3 readings):   Noninvasive MAP (mmHg)   10/04/21 1102 67   10/04/21 1100 74   10/04/21 1047 74     SpO2 Percentage    10/04/21 1047 10/04/21 1100 10/04/21 1102   SpO2: 94% 94% 93%     SpO2:  [90 %-100 %] 93 %  on   ;   Device (Oxygen Therapy): ventilator    Body mass index is 33.68 kg/m².  Wt Readings from Last 3 Encounters:   10/04/21 104 kg (228 lb 2.8 oz)   08/04/21 95.3 kg (210 lb)        Intake/Output Summary (Last 24 hours) at 10/4/2021 1118  Last data filed at 10/4/2021 0547  Gross per 24 hour   Intake 3283.96 ml   Output 750 ml   Net 2533.96 ml     NPO Diet  ----------------------------------------------------------------------------------------------------------------------      Physical Exam:    Deferred due to COVID-19 isolation.  ----------------------------------------------------------------------------------------------------------------------  Results from last 7 days   Lab Units 10/02/21  0749 10/01/21  0825   TROPONIN T ng/mL 0.056* 0.045*             Results from last 7 days   Lab Units 10/04/21  0440   PH, ARTERIAL pH units 7.326*   PO2 ART mm Hg 140.0*   PCO2, ARTERIAL mm Hg 43.2   HCO3 ART mmol/L 22.5     Results from last 7 days   Lab Units 10/04/21  0140 10/03/21  0349 10/02/21  0100 09/29/21  0408 09/28/21  1428   CRP mg/dL 2.78* 4.65* 10.92*   < >  --    WBC 10*3/mm3 7.64 5.91 10.58    < >  --    HEMOGLOBIN g/dL 8.9* 8.4* 8.9*   < >  --    HEMATOCRIT % 27.3* 24.9* 26.7*   < >  --    MCV fL 91.9 90.9 92.1   < >  --    MCHC g/dL 32.6 33.7 33.3   < >  --    PLATELETS 10*3/mm3 125* 120* 130*   < >  --    INR   --   --   --   --  0.91    < > = values in this interval not displayed.     Results from last 7 days   Lab Units 10/04/21  0140 10/03/21  0349 10/02/21  0100   SODIUM mmol/L 127* 125* 122*   POTASSIUM mmol/L 4.3 4.0 4.0   CHLORIDE mmol/L 89* 87* 84*   CO2 mmol/L 20.6* 22.7 20.7*   BUN mg/dL 76* 64* 42*   CREATININE mg/dL 3.92* 3.49* 2.56*   EGFR IF NONAFRICN AM mL/min/1.73 16* 19* 27*   CALCIUM mg/dL 8.0* 7.9* 7.7*   GLUCOSE mg/dL 203* 209* 251*   ALBUMIN g/dL 2.49* 2.48* 2.85*   BILIRUBIN mg/dL 0.3 0.2 0.3   ALK PHOS U/L 85 82 136*   AST (SGOT) U/L 8 9 10   ALT (SGPT) U/L 6 5 6   Estimated Creatinine Clearance: 27.1 mL/min (A) (by C-G formula based on SCr of 3.92 mg/dL (H)).  No results found for: AMMONIA    Glucose   Date/Time Value Ref Range Status   10/04/2021 0541 182 (H) 70 - 130 mg/dL Final     Comment:     Meter: [id number] : [id number] AUSTIN WHITE   10/04/2021 0114 205 (H) 70 - 130 mg/dL Final     Comment:     Meter: [id number] : [id number] AUSTIN WHIET   10/03/2021 2246 241 (H) 70 - 130 mg/dL Final     Comment:     Meter: [id number] : [id number] AUSTIN WHITE   10/03/2021 1742 230 (H) 70 - 130 mg/dL Final     Comment:     Meter: [id number] : [id number] Michelle Lacey   10/03/2021 1143 231 (H) 70 - 130 mg/dL Final     Comment:     Meter: [id number] : [id number] Michelle Lacey   10/03/2021 0556 252 (H) 70 - 130 mg/dL Final     Comment:     Meter: [id number] : [id number] AUSTIN WHITE   10/03/2021 0036 206 (H) 70 - 130 mg/dL Final     Comment:     Meter: [id number] : [id number] AUSTIN WHITE   10/02/2021 2204 231 (H) 70 - 130 mg/dL Final     Comment:     Meter: [id number] : [id number] AUSTIN WHITE     Lab Results   Component Value Date    HGBA1C 11.20 (H) 09/14/2021      Lab Results   Component Value Date    TSH 0.761 09/14/2021    FREET4 1.08 09/14/2021       Blood Culture   Date Value Ref Range Status   09/14/2021 No growth at 2 days  Preliminary   09/14/2021 No growth at 2 days  Preliminary     No results found for: URINECX  No results found for: WOUNDCX  No results found for: STOOLCX  No results found for: RESPCX  Pain Management Panel    There is no flowsheet data to display.           ----------------------------------------------------------------------------------------------------------------------  Imaging Results (Last 24 Hours)       Procedure Component Value Units Date/Time    XR Chest 1 View [[id number]] Collected: 10/04/21 0051     Updated: 10/04/21 0053    Narrative:      CR Chest 1 Vw    INDICATION:   Possible aspiration. Covid 19.     COMPARISON:    10/3/2021 at 0854 hours    FINDINGS:  Single portable AP view(s) of the chest.    Heart size is stable. Endotracheal tube is in good position in the mid to lower trachea. NG tube tip in the stomach. Right IJ line tips are in the SVC. No pneumothorax is seen. Lung volumes remain quite low. Infiltrates in both lungs are stable.       Impression:      Stable appearance of the chest.    Signer Name: Shawn Crow MD   Signed: 10/4/2021 12:51 AM   Workstation Name: [email or web address]    [facility]            ----------------------------------------------------------------------------------------------------------------------    Assessment/Plan       Assessment/Plan     ASSESSMENT:    1.  Septic shock with lactic acid greater than 4 on admission  2.  Covid pneumonia  3.  Aspiration pneumonia    PLAN:    Patient remains sedated and intubated at 100% FiO2.  Afebrile.  CRP improved to 2.78.  White count normal at 7.64.  Chest x-ray from 10/4/2021 reports stable appearance of the chest.    Procalcitonin from 10/1/2021 improved at 1.40.  Fungal antibody panel negative.  QuantiFERON-TB gold is indeterminate.   Aspergillus galactomannan antigen positive at 1.88.      Respiratory culture from 9/28/2021 has finalized with light growth of yeast, not Candida albicans.      CT chest with PE protocol on 9/26/2021 shows markedly limited examination with no gross pulmonary emboli.  Pneumomediastinum.  Correlate with ventilation settings.  Significant bilateral groundglass infiltrates with lower lobe consolidation and new cavitary process with filling defect in the right upper lobe.  Correlate for superimposed opportunistic infection.  Marked abnormalities in the upper abdomen suspicious for pancreatitis.  Incidental fluid-filled colon loops that may be correlated clinically with diarrhea.  Moderate steatosis of the liver, correlate with LFTs.    Legionella screen negative.  Strep pneumo screen negative. Respiratory culture from 9/15/2021 finalized as light growth of Candida albicans. Respiratory panel from 9/15/2021 detected RSV.  Blood cultures from 9/14/2021 finalized as no growth.  MRSA screen was negative on 9/15/2021.    In the setting of cavitary lesion and indeterminant QuantiFERON TB Gold there is strong indication for bronchoscopy with BAL for AFB.  Continue to recommend bronchoscopy when feasible.    Pulmonology note reviewed, in regard to cavitary lesion, recommendation to follow-up with outpatient imaging in 6 to 8 weeks and suggested antibiotic course x3 weeks.     The patient has finished a fairly prolonged course of vancomycin and meropenem.  Rocephin 2 g every 24 hours and clindamycin 600 mg IV every 8 hours have been initiated to continue through October 14, 2021.  In keeping with pulmonary recommendations, would recommend repeat CT chest in the coming week.    We will continue to follow closely and adjust coverage as needed.    Code Status:   Code Status and Medical Interventions:   Ordered at: 09/28/21 1631     Limited Support to NOT Include:    Cardioversion/Defibrillation     Level Of Support Discussed With:     Next of Kin (If No Surrogate)     Code Status:    No CPR     Medical Interventions (Level of Support Prior to Arrest):    Limited     Scribed for Chloe Garvey MD by ALYSE Thompson. 10/4/2021  11:22 EDT       ALYSE Thompson  10/04/21  11:18 EDT    Physician Attestation:    The documentation recorded by the scribe accurately reflects the service I personally performed and the decisions made by me.    Chloe Garvey MD  10/05/21  08:39 EDT

## 2021-10-04 NOTE — PLAN OF CARE
Problem: Skin Injury Risk Increased  Goal: Skin Health and Integrity  Outcome: Ongoing, Not Progressing     Problem: Fall Injury Risk  Goal: Absence of Fall and Fall-Related Injury  Outcome: Ongoing, Not Progressing     Problem: Adult Inpatient Plan of Care  Goal: Plan of Care Review  Outcome: Ongoing, Not Progressing  Goal: Patient-Specific Goal (Individualized)  Outcome: Ongoing, Not Progressing  Goal: Absence of Hospital-Acquired Illness or Injury  Outcome: Ongoing, Not Progressing  Goal: Optimal Comfort and Wellbeing  Outcome: Ongoing, Not Progressing  Goal: Readiness for Transition of Care  Outcome: Ongoing, Not Progressing     Problem: Wound  Goal: Optimal Wound Healing  Outcome: Ongoing, Not Progressing   Goal Outcome Evaluation:

## 2021-10-05 NOTE — PROGRESS NOTES
[facility] HOSPITALIST PROGRESS NOTE     Patient Identification:  Name:  Martinez Mckenna  Age:  51 y.o.  Sex:  male  :  1970  MRN:  [id number]  Visit Number:  [id number]  ROOM: [address]     Primary Care Provider:  Rafal Casillas MD    Length of stay in inpatient status:  20    Subjective     Chief Compliant:    Chief Complaint   Patient presents with   • Respiratory Distress       History of Presenting Illness:    Have been able to wean FiO2 last two days. Patient on low dose of levophed. Patient remains sedated and intubated.     I had a long [facility] discussion with patient's father and sister bedside with palliative care. Given improvement last couple of days we are going to give patient until tomorrow. If he does not continue to get better or declines they are likely going to pursue comfort care.     ROS:  Otherwise 10 point ROS negative other than documented above in HPI.     Objective     Current Hospital Meds:artificial tears, , Both Eyes, Q4H  aspirin, 81 mg, Oral, Daily  atorvastatin, 20 mg, Oral, Nightly  cefTRIAXone, 2 g, Intravenous, Q24H  chlorhexidine, 15 mL, Mouth/Throat, Q12H  clindamycin, 600 mg, Intravenous, Q8H  dexamethasone, 6 mg, Intravenous, Q24H  insulin aspart, 0-14 Units, Subcutaneous, Q6H  insulin detemir, 7 Units, Subcutaneous, Q12H  nystatin, , Topical, Q12H  pantoprazole, 40 mg, Intravenous, Q12H  polyethylene glycol, 17 g, Oral, Daily  senna-docusate sodium, 1 tablet, Oral, BID  sodium bicarbonate, 100 mEq, Intravenous, Once  sodium bicarbonate, 1,300 mg, Oral, TID  sodium chloride, 10 mL, Intravenous, Q12H  sodium chloride, 10 mL, Intravenous, Q12H  sodium chloride, 10 mL, Intravenous, Q12H  sodium chloride, 10 mL, Intravenous, Q12H  voriconazole, 200 mg, Per G Tube, Q12H    dexmedetomidine, 0.2-1.5 mcg/kg/hr, Last Rate: 0.5 mcg/kg/hr (10/04/21 6513)  fentaNYL Citrate,   fentaNYL Citrate,   heparin (porcine), 9.8 Units/kg/hr, Last Rate: 15.8 Units/kg/hr (10/05/21  0943)  Midazolam 1 mg/mL 100mL NS, 1-10 mg/hr, Last Rate: 6 mg/hr (10/05/21 0935)  norepinephrine, 0.02-0.3 mcg/kg/min, Last Rate: Stopped (10/05/21 0251)  propofol, 5-50 mcg/kg/min, Last Rate: 50 mcg/kg/min (10/05/21 0933)  vecuronium (NORCURON) infusion, 0.6-1.2 mcg/kg/min, Last Rate: 1.2 mcg/kg/min (10/05/21 0523)        Current Antimicrobial Therapy:  Anti-Infectives (From admission, onward)    Ordered     Dose/Rate Route Frequency Start Stop    10/04/21 0910  cefTRIAXone (ROCEPHIN) 2 g in sodium chloride 0.9 % 100 mL IVPB-VTB     Ordering Provider: Chloe Garvey MD    2 g  200 mL/hr over 30 Minutes Intravenous Every 24 Hours 10/04/21 1200 10/14/21 1159    10/04/21 0910  clindamycin (CLEOCIN) 600 mg in dextrose 5% 50 mL IVPB (premix)     Ordering Provider: Chloe Garvey MD    600 mg  50 mL/hr over 60 Minutes Intravenous Every 8 Hours 10/04/21 1100 10/14/21 1059    09/30/21 1426  voriconazole (VFEND) tablet 200 mg     Mariana Macias APRN reviewed the order on 10/02/21 1325.   Ordering Provider: Chloe Garvey MD    200 mg Per G Tube Every 12 Hours 10/01/21 1800 10/21/21 1759    10/01/21 1709  vancomycin 1 g/250 mL 0.9% NS (vial-mate)     Ordering Provider: Chloe Garvey MD    1,000 mg  over 60 Minutes Intravenous Once 10/01/21 1800 10/01/21 1850    09/30/21 1426  voriconazole (VFEND) 500 mg in dextrose (D5W) 5 % 100 mL IVPB     Ordering Provider: Chloe Garvey MD    6 mg/kg × 83.2 kg (Adjusted)  over 60 Minutes Intravenous Every 12 Hours 09/30/21 1600 10/01/21 0520    09/29/21 1501  vancomycin 1 g/250 mL 0.9% NS (vial-mate)     Ordering Provider: Kieran Rene MD    1,000 mg  over 60 Minutes Intravenous Once 09/29/21 1600 09/29/21 1859    09/27/21 1300  vancomycin 1 g/250 mL 0.9% NS (vial-mate)     Ordering Provider: Kieran Rene MD    1,000 mg  over 60 Minutes Intravenous Once 09/27/21 1500 09/27/21 1813    09/26/21 1426  Vancomycin Pharmacy Intermittent Dosing     Ordering  Provider: Jaqui Tapia PA     Does not apply Daily 09/26/21 1515 10/01/21 0859    09/26/21 1408  meropenem (MERREM) 500 mg in sodium chloride 0.9 % 100 mL IVPB-VTB     Ordering Provider: Jaqui Tapia PA    500 mg  over 3 Hours Intravenous Every 24 Hours 09/26/21 1500 09/30/21 1739    09/23/21 1302  vancomycin 1 g/250 mL 0.9% NS (vial-mate)     Ordering Provider: Chloe Garvey MD    1,000 mg  over 60 Minutes Intravenous Once 09/23/21 1400 09/23/21 1551    09/21/21 1259  meropenem (MERREM) 500 mg in sodium chloride 0.9 % 100 mL IVPB-VTB     Ordering Provider: Chloe Garvey MD    500 mg  over 3 Hours Intravenous Every 24 Hours 09/22/21 1400 09/26/21 1707    09/21/21 1257  vancomycin 1 g/250 mL 0.9% NS (vial-mate)     Ordering Provider: Chloe Garvey MD    1,000 mg  over 60 Minutes Intravenous Once 09/21/21 1400 09/21/21 1735    09/19/21 0947  Vancomycin Pharmacy Intermittent Dosing     Ordering Provider: Chloe Garvey MD     Does not apply Daily 09/20/21 0900 09/26/21 0859    09/19/21 0947  vancomycin 1750 mg/500 mL 0.9% NS IVPB ([facility])     Ordering Provider: Chloe Garvey MD    20 mg/kg × 90.1 kg  over 120 Minutes Intravenous Once 09/19/21 1200 09/19/21 1454    09/19/21 0947  meropenem (MERREM) 500 mg in sodium chloride 0.9 % 100 mL IVPB-VTB     Ordering Provider: Chloe Garvey MD    500 mg  over 30 Minutes Intravenous Once 09/19/21 1200 09/19/21 1325    09/15/21 0443  remdesivir 100 mg in 270 mL NS     Ordering Provider: Adama Brown MD    100 mg  over 60 Minutes Intravenous Every 24 Hours 09/16/21 0600 09/19/21 1013    09/15/21 0443  remdesivir 200 mg in 290 mL NS     Ordering Provider: Adama Brown MD    200 mg  over 60 Minutes Intravenous Every 24 Hours 09/15/21 0600 09/15/21 0742    09/14/21 1912  vancomycin 2000 mg/500 mL 0.9% NS IVPB ([facility])     Ordering Provider: Gilmer Avitia MD    20 mg/kg × 95.3 kg  over 120 Minutes  Intravenous Once 09/14/21 2000 09/14/21 2240 09/14/21 1950  piperacillin-tazobactam (ZOSYN) IVPB 4.5 g in 100 mL NS VTB     Ordering Provider: Gilmer Avitia MD    4.5 g  over 30 Minutes Intravenous Once 09/14/21 1952 09/14/21 2054 09/14/21 1101  piperacillin-tazobactam (ZOSYN) IVPB 3.375 g in 100 mL NS VTB     Ordering Provider: James Interiano MD    3.375 g  over 30 Minutes Intravenous Once 09/14/21 1103 09/14/21 1202        Current Diuretic Therapy:  Diuretics (From admission, onward)    Ordered     Dose/Rate Route Frequency Start Stop    09/17/21 1314  furosemide (LASIX) injection 80 mg     Ordering Provider: Sb Sullivan MD    80 mg Intravenous Once 09/17/21 1400 09/17/21 1343        ----------------------------------------------------------------------------------------------------------------------  Vital Signs:  Temp:  [97.8 °F (36.6 °C)-99.3 °F (37.4 °C)] 99.3 °F (37.4 °C)  Heart Rate:  [] 98  Resp:  [32-33] 32  BP: ()/(48-90) 89/54  FiO2 (%):  [70 %-90 %] 70 %  SpO2:  [86 %-100 %] 89 %  on   ;   Device (Oxygen Therapy): ventilator  Body mass index is 33.84 kg/m².    Wt Readings from Last 3 Encounters:   10/05/21 104 kg (229 lb 4.5 oz)   08/04/21 95.3 kg (210 lb)     Intake & Output (last 3 days)       10/02 0701 - 10/03 0700 10/03 0701 - 10/04 0700 10/04 0701 - 10/05 0700 10/05 0701 - 10/06 0700    I.V. (mL/kg) 2295.9 (22.1) 2448 (23.8) 2020.3 (19.4)     Other 50 60 0     NG/ 725 7284     IV Piggyback 100 100 400 150    Total Intake(mL/kg) 3342.9 (32.1) 3284 (31.9) 3465.3 (33.3) 150 (1.4)    Urine (mL/kg/hr) 700 (0.3) 750 (0.3) 700 (0.3)     Other        Stool  0 0     Total Output 700 750 700     Net +2642.9 +2534 +2765.3 +150            Stool Unmeasured Occurrence  1 x 1 x         NPO Diet  ----------------------------------------------------------------------------------------------------------------------  Physical exam:  GENERAL:  Patient intubated and sedated.    SKIN:  Warm, dry without rashes, purpura or petechiae.  EYES:  Pupils equal, round and reactive to light.  EOMs intact.  Conjunctivae normal.  HEAD:  Normocephalic. Atraumatic.   EARS/NOSE/MOUTH/THROAT:  Hearing intact. Septum midline.  No excoriations or nasal discharge. No stomatitis or ulcers.  Lips normal. Oropharynx without lesions or exudates.  NECK:  Supple with good range of motion; no thyromegaly or masses  LYMPHATICS:  No cervical, supraclavicular, axillary adenopathy.  RESP:  Lungs clear to auscultation. Good airflow. Intubated receiving mechanical ventilation.   CARDIAC:  Regular rate and rhythm without murmurs, rubs or gallops. Normal S1,S2. No edema  GI:  Soft, nontender, no hepatosplenomegaly, normal bowel sounds  MSK:  No clubbing or cyanosis, No joint swelling noted in hands  NEUROLOGICAL:  No focal deficit. Pupils equal and reactive. -----------------------------------------------------------------------------------------------------  Tele:    ----------------------------------------------------------------------------------------------------------------------  Results from last 7 days   Lab Units 10/05/21  0207 10/04/21  0140 10/03/21  0349 09/29/21  0408 09/28/21  1428   CRP mg/dL 4.74* 2.78* 4.65*   < >  --    WBC 10*3/mm3 8.83 7.64 5.91   < >  --    HEMOGLOBIN g/dL 8.3* 8.9* 8.4*   < >  --    HEMATOCRIT % 25.6* 27.3* 24.9*   < >  --    MCV fL 92.4 91.9 90.9   < >  --    MCHC g/dL 32.4 32.6 33.7   < >  --    PLATELETS 10*3/mm3 130* 125* 120*   < >  --    INR   --   --   --   --  0.91    < > = values in this interval not displayed.     Results from last 7 days   Lab Units 10/05/21  0437   PH, ARTERIAL pH units 7.310*   PO2 ART mm Hg 116.0*   PCO2, ARTERIAL mm Hg 53.5*   HCO3 ART mmol/L 26.9*     Results from last 7 days   Lab Units 10/05/21  0207 10/04/21  0140 10/03/21  0349   SODIUM mmol/L 127* 127* 125*   POTASSIUM mmol/L 4.0 4.3 4.0   CHLORIDE mmol/L 87* 89* 87*   CO2 mmol/L 23.2 20.6* 22.7    BUN mg/dL 48* 76* 64*   CREATININE mg/dL 2.77* 3.92* 3.49*   EGFR IF NONAFRICN AM mL/min/1.73 25* 16* 19*   CALCIUM mg/dL 8.0* 8.0* 7.9*   GLUCOSE mg/dL 167* 203* 209*   ALBUMIN g/dL 2.76* 2.49* 2.48*   BILIRUBIN mg/dL 0.4 0.3 0.2   ALK PHOS U/L 80 85 82   AST (SGOT) U/L 10 8 9   ALT (SGPT) U/L 6 6 5   Estimated Creatinine Clearance: 38.3 mL/min (A) (by C-G formula based on SCr of 2.77 mg/dL (H)).  No results found for: AMMONIA  Results from last 7 days   Lab Units 10/02/21  0749 10/01/21  0825   TROPONIN T ng/mL 0.056* 0.045*             Glucose   Date/Time Value Ref Range Status   10/05/2021 1240 328 (H) 70 - 130 mg/dL Final     Comment:     Meter: [id number] : [id number] Christinedaphne Wendi GUTIERREZ   10/05/2021 0513 221 (H) 70 - 130 mg/dL Final     Comment:     Meter: [id number] : [id number] VALENTINE GONZALES   10/04/2021 2325 148 (H) 70 - 130 mg/dL Final     Comment:     Meter: [id number] : [id number] VALENTINE GONZALES   10/04/2021 1746 229 (H) 70 - 130 mg/dL Final     Comment:     Meter: [id number] : [id number] Savannah serna   10/04/2021 1122 224 (H) 70 - 130 mg/dL Final     Comment:     Meter: [id number] : [id number] Betsy Kurtz   10/04/2021 0541 182 (H) 70 - 130 mg/dL Final     Comment:     Meter: [id number] : [id number] AUSTIN WHITE   10/04/2021 0114 205 (H) 70 - 130 mg/dL Final     Comment:     Meter: [id number] : [id number] AUSTIN WHITE   10/03/2021 2246 241 (H) 70 - 130 mg/dL Final     Comment:     Meter: [id number] : [id number] AUSTIN WHITE     Lab Results   Component Value Date    TSH 0.761 09/14/2021    FREET4 1.08 09/14/2021     No results found for: PREGTESTUR, PREGSERUM, HCG, HCGQUANT  Pain Management Panel    There is no flowsheet data to display.       Brief Urine Lab Results  (Last result in the past 365 days)      Color   Clarity   Blood   Leuk Est   Nitrite   Protein   CREAT   Urine HCG        09/14/21 1108 Yellow Clear Small (1+) Negative Negative 100 mg/dL (2+)              No results found for: BLOODCX  No results found for: URINECX  No results found for: WOUNDCX  No results found for: STOOLCX  No results found for: RESPCX  No results found for: AFBCX  Results from last 7 days   Lab Units 10/05/21  0207 10/04/21  0140 10/03/21  0349 10/02/21  0100 10/01/21  0432 09/30/21  0539 09/29/21  0408   PROCALCITONIN ng/mL  --   --  0.93*  --  1.40*  --  1.61*   CRP mg/dL 4.74* 2.78* 4.65* 10.92* 10.11* 10.58* 9.31*       I have personally looked at the labs and they are summarized above.  ----------------------------------------------------------------------------------------------------------------------  Detailed radiology reports for the last 24 hours:    Imaging Results (Last 24 Hours)     Procedure Component Value Units Date/Time    XR Chest 1 View [[id number]] Collected: 10/05/21 0900     Updated: 10/05/21 0903    Narrative:      EXAM:    XR Chest, 1 View     EXAM DATE:    10/5/2021 6:23 AM     CLINICAL HISTORY:    respiratory failure; E13.11-Other specified diabetes mellitus with  ketoacidosis with coma; J96.01-Acute respiratory failure with hypoxia;  U07.1-COVID-19; N17.9-Acute kidney failure, unspecified; K85.90-Acute  pancreatitis without necrosis or infection, unspecified;  K29.90-Gastroduodenitis, unspecified, without bleeding;  J15.9-Unspecified bacterial pneumonia; E83.39-Other disorders of  phosphorus me     TECHNIQUE:    Frontal view of the chest.     COMPARISON:    10/04/2021     FINDINGS:    Lungs:  Patchy bilateral airspace disease stable.    Pleural space:  No pneumothorax identified.    Heart:  Unremarkable.  No cardiomegaly.    Mediastinum:  Unremarkable.    Bones/joints:  Unremarkable.    Vasculature:  Right IJ deep line with tip in the distal SVC region.    Tubes, lines and devices:  ET tube tip just below clavicles.  Right IJ  dialysis catheter with tip at the distal SVC region.  NG tube extends  into the stomach.       Impression:      1.  Stable bilateral airspace  "disease.  2.  Positioning of support devices detailed above.     This report was finalized on 10/5/2021 9:00 AM by Dr. James Thomason MD.           Assessment & Plan    51M PMH DM Type II, reportedly tested positive for Covid 19 1 week prior, brought in unresponsive per EMS.     #Severe Sepsis/Septic Shock, Acute Metabolic Encephalopathy & Acute Hypoxic Respiratory Failure requiring intubation and mechanical ventilation 2/2 PNA, Viral, treating for Covid 19 & RSV c/b ARDS and pneumomediastinum but also covering for PNA, Bacterial, treating for MDR Organisms, also treating for PNA, Fungal w/ Candida sp. But now w/ c/f opportunistic infection w/ development of cavitary lesion RUL  - Patient presented w/ to ER via EMS and unresponsive at home, reportedly diagnosed w/ Covid 19 1 week prior, Glc read as \"high\" in the field, intubated upon arrival to ER 9/14.  HR > 90, RR > 20, WBC count > 12K, hypotensive requiring IVFs and pressors, covid +, PNA confirmed on imaging.  Has been admitted and intubated for prolonged period of time, since 9/14, has been on broad spectrum Abx and antifungal therapy but w/ c/f worsening infection, imaging now w/ c/f RUL cavitary lesion.   - Admission labs showed WBC count 16K, CRP 2.12, lactate 6.1, procal 0.35->1.37, Covid 19 +, RSV +, ABG 6.9/23/46, Resp Cx growing candida sp, MRSA negative, Bcx's NGTD, repeat Bcx's 9/27 NGTD  - AFB Cx's pending, Resp culture growing yeast not candida sp  - Echo showed LVEF 56-60%, diastolic dysfunction, otherwise unremarkable   - B/l Venous Duplex negative for DVT  - CT Head showed no acute intracranial abnormality, atrophy and CSVID  - CT Chest w/o contrast showed B/l patchy GGO's typical for Covid 19, moderate disease, dense consolidation b/l LL's; repeat CTPE 9/26 showed no PE, pneumomediastinum, significant b/l GGO's w/ LL consolidation and new cavitary process w/ filling defect in RUL   - VQ scan (perfusion only) showed low suspicion for PE  - ID " consulted; Following, s/p Remdesivir  - Pulm consulted; Following, rec'd 3 weeks Abx, repeat imaging 6-8 weeks   - Continue Vanc, Meropenem and have extended dosing today for 3 weeks total since cavitary lesion noted on 9/26, continue Voriconazole as per ID given elevated Aspergillus galactomannan Ag  - Continue extended course of Dexamethasone now daily  - Continue Heparin gtt given d-dimer trended to > 20 though no definitive thrombus identified, covering for microvascular thormbi as well, caution given underlying cavitary lung lesion  - Continue Albuterol Inhaler PRN  - Continue IV pressors to keep MAPs > 65 or SBP > 90, off pressors this AM  - Continue pain and sedation gtt's as indicated  - Continue IV PPI for stress ulcer PPx  - Reviewed AM ABG and increased Fi02 due to mildly low P02, otherwise stable, repeat CXR little overall changed today  - Trend Covid 19 progression labs w/ CBC, CMP, CK, CRP, Ferritin daily and LDH, D-dimer, Fibrinogen q48hrs.  - Monitor in CCU, enhanced droplet/contact isolation precautions, continue 02 but wean as able, SAT/SBT when appropriate, palliative care following for GOC conversations  - ABG this AMN 7.310/53/116 on 90%, PEEP of 14. FiO2 weaned down to 70%.   - CTPE negative on 9/26, D-dimer has trended down, patient not tachycardic, and patient has been on heparin gtt. Will hold repeat CTPE for now.     #RUL cavitary lesion  - Galactomanan antibody positive, quantiferon gold indeterminate   - Pulmonology following. ID following.   - Continue antifungal coverage as above   - Will order AFBX3 from secretions obtained from ET tube. Yield probably less than BAL but does not appear bronchoscopy recommended. AFB culture NGTD.   - Repeat CT chest around 10/11.   - Discussed with ID, will leave in isolation.      #Elevated Troponins  - Labs showed trop 0.045, previously NML on admission, up 10/2 at 0.056  - EKG showed some c/f nonspecific ST/T wave abnormality overnight  - Echo  previously unremarkable; Repeating limited echo  - Cards consulted; Following, rec'd heparin, ASA, plavix, statin  - On Heparin as per above, have started on ASA 81, holding on plavix for now given cavitary lung lesion and would be on triple therapy w/ increased likelihood to bleed, have started on low dose statin due to receiving Voriconizole which can increase concentrations, trending LFTs  - Continue to monitor on tele, trend trops, trend HR and BP    #Nonoliguric -> Oliguric JACKY 2/2 Sepsis/DKA w/ suspected ATN now on intermittent HD  - B/l Cr 0.7, was 2.8 on admission, trended up to 7.0 on 9/20. Renal US showed unremarkable kidney's, no obstruction noted  - Nephrology consulted; Following for iHD needs, some improved UOP     #Acute Pancreatitis, unclear etiology  - Admission labs showed WBC count 16K, CRP 2.12, Lipase > 3000, lactate 6.1, AST mild elevation, Tbili NML, TG's 136, YUDELKA negative, RF NML, Anti-Smith Ab negative, Anti-DS DNA NML, SSA/SSB NML, C3 mildly low, C4 NML, complement borderline low, IGG4 NML, Bcx's NGTD.  CT showed inflammation surrounding the pancreas as well as gastric antrum and first portion of duodenum favoring acute pancreatitis w/ secondary inflammation of GI tract though also considered gastroduodenitis. GB US showed no evidence of cholelithiasis.  Given serum lipase > 3x ULN, characteristic findings on imaging, patient meets criteria for acute pancreatitis diagnosis.  Patient does have ongoing organ failure which is multifactorial, local complication of secondary GI tract inflammation and could be considered severe disease but truly is multifactorial at this point.  Etiology remains unclear, repeat CTPE 9/26 still showing marked abnormalities upper abdomen suspicious for pancreatitis.     #DKA c/b Acute Encephalopathy/DM Coma, Severe Systemic Acidosis in setting of NIDDM Type II, uncontrolled, now IDDM Type II - DKA Resolved  - Hgb A1c = 11.2%, no home oral agents or SQ insulin per  meds rec, given severe encephalopathy/coma on presentation required intubation for airway protection. Admission labs showed Glc up to 1100, AG 39, Bicarb 2.8, Acetone moderate, ABG 6.9/23/46, K 4.5, lactate 6.1; Continue FSBG and SSI, continue Levemir 7U qhs and titrate as indicated    #Electrolyte Abnormalities  - Acute Mild Hyperkalemia - Likely related to renal failure, K up to 5.5, Resolved  - Borderline Hypomagnesemia - Mg down to 1.6, Replaced per protocol, Resolved   - Acute Mild Hypovolemic Hypernatremia (POA & Not POA) - Na up to 152 on admission, resolved then became hyponatremic, today Na improved at 128 corrected, dialysate per nephrology   - Acute Mild/Mod Hypocalcemia -  Replacing, on protocol. Corrected calcium 8.3.   - Acute Mild Hypophosphatemia - Phos as low as 2.0, Replaced per protocol, Resolved    #Anxiety/Depression  - Holding home Venlafaxine while intubated    F: TFs  A: Fentanyl  S: Propofol, versed, precedex  T: Heparin gtt  H: HOB elevated   U: Pantoprazole  G: SSI, basal  S: Not ready  B: Doc-senna and miralax  I: RIJ 9/14, Hemodialysis catheter 9/21  D: Vanc, meropenem, voriconazole    Code status: DNR/DNI. Grim Prognosis. SOFA score currently 13. Palliative care consulted.      Dispo: Continue CCU level care     I have spent 40 minutes of critical care time  with > 50% of time spent in direct patient care, evaluating the patient at bedside, reviewing all labs and images, reviewing ABG and adjusting vent settings, reviewing pain and sedation gtt's, reviewing pressor requirement, communicating plan of care w/ nursing staff, utilizing high complexity medical decision making to assess and treat vital organ system failure in an individual who has impairment of one or more vital organ systems such that there is a high probability of imminent or life threatening deterioration in the patient’s condition. Failure to initiate the above interventions on an urgent basis would likely result in  sudden, clinically significant or life threatening deterioration in the patient's condition.        VTE Prophylaxis:   Mechanical Order History:     None      Pharmalogical Order History:      Ordered     Dose Route Frequency Stop    09/28/21 1239  heparin (porcine) 5000 UNIT/ML injection 5,000 Units      5,000 Units IV Once 09/28/21 1439    09/28/21 1239  heparin 10470 units/250 mL (100 units/mL) in 0.45 % NaCl infusion  9.99 mL/hr      9.8 Units/kg/hr IV Titrated --    09/28/21 1239  heparin (porcine) 5000 UNIT/ML injection 5,000 Units      5,000 Units IV As Needed --    09/28/21 1239  heparin (porcine) 5000 UNIT/ML injection 2,500 Units      2,500 Units IV As Needed --    09/23/21 1512  heparin (porcine) 5000 UNIT/ML injection 5,000 Units  Status:  Discontinued      5,000 Units SC Every 8 Hours Scheduled 09/28/21 1239    09/15/21 0440  heparin (porcine) 5000 UNIT/ML injection 5,000 Units      5,000 Units IV Once 09/15/21 0621    09/15/21 0440  heparin 69154 units/250 mL (100 units/mL) in 0.45 % NaCl infusion  8.73 mL/hr,   Status:  Discontinued      10 Units/kg/hr IV Titrated 09/23/21 1512    09/15/21 0440  heparin (porcine) 5000 UNIT/ML injection 5,000 Units  Status:  Discontinued      5,000 Units IV As Needed 09/23/21 1512    09/15/21 0440  heparin (porcine) 5000 UNIT/ML injection 2,500 Units  Status:  Discontinued      2,500 Units IV As Needed 09/23/21 1512                    Keegan Monte MD  [facility]  10/05/21  12:58 EDT

## 2021-10-05 NOTE — PROGRESS NOTES
[facility] Physician - Brief Progress Note  PERMANENT  10/05/2021 03:25    [facility] [place] [facility] [place])    LAURADELROY    Date of Service 10/05/2021 03:25    HPI/Events of Note D dimer 9.17 (Was 11.4)   On Heparin Infusion      Interventions Intermediate-Diagnostic test evaluation        Electronically Signed by: Sully Zhong) on 10/05/2021 03:26

## 2021-10-05 NOTE — PROGRESS NOTES
"Palliative Care Daily Progress Note     S: Medical record reviewed, followed up with Primary RN Lilly and Dr. Monte regarding patient's condition. Dr. Monte states that pt has made some improvements since yesterday, that his FiO2 requirements had been reduced. I let Dr. Monte know pts family is coming in for family meeting, he let me know to let him know when they arrive.      O:   Palliative Performance Scale Score:     BP (!) 89/54   Pulse 98   Temp 99.3 °F (37.4 °C) (Oral)   Resp (!) 32   Ht 175.3 cm (69.02\")   Wt 104 kg (229 lb 4.5 oz)   SpO2 (!) 89%   BMI 33.84 kg/m²     Intake/Output Summary (Last 24 hours) at 10/5/2021 1323  Last data filed at 10/5/2021 1221  Gross per 24 hour   Intake 3415.34 ml   Output 700 ml   Net 2715.34 ml       PE:  COVID RESTRICTIONS      Meds: Reviewed and changes noted.    Labs:   Results from last 7 days   Lab Units 10/05/21  0207   WBC 10*3/mm3 8.83   HEMOGLOBIN g/dL 8.3*   HEMATOCRIT % 25.6*   PLATELETS 10*3/mm3 130*     Results from last 7 days   Lab Units 10/05/21  0207   SODIUM mmol/L 127*   POTASSIUM mmol/L 4.0   CHLORIDE mmol/L 87*   CO2 mmol/L 23.2   BUN mg/dL 48*   CREATININE mg/dL 2.77*   GLUCOSE mg/dL 167*   CALCIUM mg/dL 8.0*     Results from last 7 days   Lab Units 10/05/21  0207   SODIUM mmol/L 127*   POTASSIUM mmol/L 4.0   CHLORIDE mmol/L 87*   CO2 mmol/L 23.2   BUN mg/dL 48*   CREATININE mg/dL 2.77*   CALCIUM mg/dL 8.0*   BILIRUBIN mg/dL 0.4   ALK PHOS U/L 80   ALT (SGPT) U/L 6   AST (SGOT) U/L 10   GLUCOSE mg/dL 167*     Imaging Results (Last 72 Hours)     Procedure Component Value Units Date/Time    XR Chest 1 View [[id number]] Collected: 10/05/21 0900     Updated: 10/05/21 0903    Narrative:      EXAM:    XR Chest, 1 View     EXAM DATE:    10/5/2021 6:23 AM     CLINICAL HISTORY:    respiratory failure; E13.11-Other specified diabetes mellitus with  ketoacidosis with coma; J96.01-Acute respiratory failure with hypoxia;  U07.1-COVID-19; N17.9-Acute " kidney failure, unspecified; K85.90-Acute  pancreatitis without necrosis or infection, unspecified;  K29.90-Gastroduodenitis, unspecified, without bleeding;  J15.9-Unspecified bacterial pneumonia; E83.39-Other disorders of  phosphorus me     TECHNIQUE:    Frontal view of the chest.     COMPARISON:    10/04/2021     FINDINGS:    Lungs:  Patchy bilateral airspace disease stable.    Pleural space:  No pneumothorax identified.    Heart:  Unremarkable.  No cardiomegaly.    Mediastinum:  Unremarkable.    Bones/joints:  Unremarkable.    Vasculature:  Right IJ deep line with tip in the distal SVC region.    Tubes, lines and devices:  ET tube tip just below clavicles.  Right IJ  dialysis catheter with tip at the distal SVC region.  NG tube extends  into the stomach.       Impression:      1.  Stable bilateral airspace disease.  2.  Positioning of support devices detailed above.     This report was finalized on 10/5/2021 9:00 AM by Dr. James Thomason MD.       XR Chest 1 View [[id number]] Collected: 10/04/21 0051     Updated: 10/04/21 0053    Narrative:      CR Chest 1 Vw    INDICATION:   Possible aspiration. Covid 19.     COMPARISON:    10/3/2021 at 0854 hours    FINDINGS:  Single portable AP view(s) of the chest.    Heart size is stable. Endotracheal tube is in good position in the mid to lower trachea. NG tube tip in the stomach. Right IJ line tips are in the SVC. No pneumothorax is seen. Lung volumes remain quite low. Infiltrates in both lungs are stable.       Impression:      Stable appearance of the chest.    Signer Name: Shawn Crow MD   Signed: 10/4/2021 12:51 AM   Workstation Name: [facility]    [facility]    XR Chest 1 View [[id number]] Collected: 10/03/21 0943     Updated: 10/03/21 0945    Narrative:      CR Chest 1 Vw    INDICATION:   Acute respiratory failure. Covid 19 pneumonia.     COMPARISON:    10/2/2021    FINDINGS:  Portable AP view(s) of the chest.    Support lines and tubes are  "unchanged.    Improved lung volumes with decreasing right upper lobe infiltrate. Continued left and right basilar infiltrates. No sizable effusions or pneumothorax. Heart and mediastinum unremarkable.       Impression:      Slightly improved lung volumes with decreasing right upper lobe parenchymal opacity which may reflect atelectasis.    Signer Name: DAILY Davis MD   Signed: 10/3/2021 9:43 AM   Workstation Name: [facility]    [facility] [place]            Diagnostics: Reviewed    A: Martinez Mckenna is a 51 y.o. yo male  in a unresponsive state. He has a past medical history of diabetes who presented via EMS unresponsive from home. Per EMS report obtained from patient's father, patient was diagnosed with COVID-19 pneumonia 1 week ago and has been unresponsive essentially since that time. Glucose reading was \"high\" in the field. Upon arrival to the ED his GCS was 3; he was unresponsive to painful stimuli. He had unobtainable BP but a palpable femoral pulse. He was critically hypotensive. He exhibited rapid shallow breathing. ED workup revealed DKA, hyperkalemia, JACKY, metabolic acidosis, and leukocytosis. Troponin was negative x2, while BNP was mildly elevated but this could be falsely high from renal failure. Lipase and amylase were also significantly elevated. CT imaging showed COVID-19 pneumonia with superimposed bacterial pneumonia, along with probable changes of pancreatitis, and inflammation surrounding pancreas. Patient is currently on precedex and fentanyl for sedation, along with IV insulin drip for DKA. Antibiotics have been added to cover for superimposed bacterial pneumonia mentioned on CT imaging. She has been admitted to the CCU for further management.     Today 10/5/2021  Pt temp was 99.3, HR was 106, BP 94/54, and is on ventilator @70% FiO2 Peep of 14, rate 32 and was saturating 92% and did not appear to be in any distress.         P:  Patients father Román and sister Cari " came in today for a family meeting to discuss GOC/ACP for how they wanted to move forward with Martinez's care. Román states that he does not feel like Martinez would still want to be on the ventilator after this long of time. Dr. Monte attended the meeting and let family know that the patient had made some improvement since yesterday in his oxygen requirement. He also stated that it would be a long recovery as far as rehab and that he may need life long hemodialysis. Román and Cari both felt that Martinez would not be okay with that either. The decision was made between Dr. Monte , palliative and family that they wanted to give him 24 hours and then from their family would make decision whether to make him comfort care or to continue treatment.    We will continue to follow along. Please do not hesitate to contact us regarding further sx mgmt or GOC needs, including after hours or on weekends via our on call provider at [phone].     Dora Fleming, APRN    10/5/2021

## 2021-10-05 NOTE — PROGRESS NOTES
[facility]         Dr Burrell Sayied   LOS: 20 days   Patient Care Team:  Rafal Casillas MD as PCP - General (Family Medicine)    Chief Complaint: Vent management with COVID-19 pneumonia ARDS DKA renal failure cavitary lung lesion    Interval History: Sedated intubated and paralyzed.  Currently on fentanyl propofol Versed Precedex for sedation.  Nimbex for paralytics.  Levophed and heparin drips also.  FiO2 70% with PEEP of 14.  He did desat with repositioning    REVIEW OF SYSTEMS:   Sedated intubated paralyzed    Ventilator/Non-Invasive Ventilation Settings (From admission, onward)     Start     Ordered    10/01/21 0729  Ventilator - AC/VC; (29); 50; 90%; 12; 440  Continuous     Comments: Changes made by MD   Question Answer Comment   Vent Mode AC/VC    Breath rate  29   FiO2 50    FiO2 titrate for Sp02% =/> 90%    PEEP 12    Tidal Volume 440        10/01/21 0728    09/29/21 1313  Ventilator - AC/VC; (29); 50; 90%; 12; 460  Continuous,   Status:  Canceled     Comments: Changes made by MD   Question Answer Comment   Vent Mode AC/VC    Breath rate  29   FiO2 50    FiO2 titrate for Sp02% =/> 90%    PEEP 12    Tidal Volume 460        09/29/21 1312    09/28/21 1749  Ventilator - AC/VC; (30); 80; 90%; 12; 440  Continuous,   Status:  Canceled     Comments: Changes made by MD   Question Answer Comment   Vent Mode AC/VC    Breath rate  30   FiO2 80    FiO2 titrate for Sp02% =/> 90%    PEEP 12    Tidal Volume 440        09/28/21 1750    09/28/21 0639  Ventilator - AC/PC; (28); 70; 90%; Other (see comments); 14; 15  Continuous,   Status:  Canceled     Question Answer Comment   Vent Mode AC/PC    Breath rate  28   FiO2 70    FiO2 titrate for Sp02% =/> 90%    PEEP Other (see comments)    PEEP: 14    Inspiratory Pressure 15        09/28/21 0639    09/21/21 1347  Ventilator - AC/PC; (24); 60; 90%; 12; 15  Continuous,   Status:  Canceled     Question Answer Comment   Vent Mode AC/PC    Breath rate  24   FiO2 60     FiO2 titrate for Sp02% =/> 90%    PEEP 12    Inspiratory Pressure 15        09/21/21 1346    09/18/21 1233  Ventilator - AC/PC; (14); 60; 90%; 10; 15  Continuous,   Status:  Canceled     Comments: Increase peep 10   Question Answer Comment   Vent Mode AC/PC    Breath rate  14   FiO2 60    FiO2 titrate for Sp02% =/> 90%    PEEP 10    Inspiratory Pressure 15        09/18/21 1233    09/18/21 0924  Ventilator - AC/PC; (14); 60; 90%; 8; 15  Continuous,   Status:  Canceled     Comments: Increase to 60% and decrease rate to 14, abg at 1200   Question Answer Comment   Vent Mode AC/PC    Breath rate  14   FiO2 60    FiO2 titrate for Sp02% =/> 90%    PEEP 8    Inspiratory Pressure 15        09/18/21 0923    09/18/21 0922  Ventilator - AC/PC; (4); 60; 90%; 8; 15  Continuous,   Status:  Canceled     Comments: Increase to 60% and decrease rate to 14, abg at 1200   Question Answer Comment   Vent Mode AC/PC    Breath rate  4   FiO2 60    FiO2 titrate for Sp02% =/> 90%    PEEP 8    Inspiratory Pressure 15        09/18/21 0922    09/16/21 1222  Ventilator - AC/PC; (18); 40; 8  Continuous,   Status:  Canceled     Question Answer Comment   Vent Mode AC/PC    Breath rate  18   FiO2 40    PEEP 8        09/16/21 1222    09/15/21 1652  Ventilator - AC/VC+; (28); 40; 8; 500  Continuous,   Status:  Canceled     Question Answer Comment   Vent Mode AC/VC+    Breath rate  28   FiO2 40    PEEP 8    Tidal Volume 500        09/15/21 1652    09/15/21 0834  Ventilator - AC/VC; (28); 90; 8; 500  Continuous,   Status:  Canceled     Question Answer Comment   Vent Mode AC/VC    Breath rate  28   FiO2 90    PEEP 8    Tidal Volume 500        09/15/21 0834                  Vital Signs  Temp:  [97.8 °F (36.6 °C)-99.3 °F (37.4 °C)] 99.3 °F (37.4 °C)  Heart Rate:  [] 98  Resp:  [32-33] 32  BP: ()/(48-90) 89/54  FiO2 (%):  [70 %-90 %] 70 %    Intake/Output Summary (Last 24 hours) at 10/5/2021 1122  Last data filed at 10/5/2021 0969  Gross per  "24 hour   Intake 3515.34 ml   Output 700 ml   Net 2815.34 ml     Flowsheet Rows      First Filed Value   Admission Height  175.3 cm (69\") Documented at 09/14/2021 1053   Admission Weight  95.3 kg (210 lb) Documented at 09/14/2021 1053          Physical Exam:  Patient is examined using the personal protective equipment as per guidelines from infection control for this particular patient as enacted.  Hand hygiene was performed before and after patient interaction.   General Appearance:   Sedated intubated paralyzed  Neck midline trachea, no thyromegaly   Lungs:    Diminished breath sounds equal on the vent    Heart:    Regular rhythm and normal rate, normal S1 and S2, no            murmur, no gallop, no rub, no click   Chest Wall:    No abnormalities observed   Abdomen:     Normal bowel sounds, no masses, no organomegaly, soft        nontender, nondistended, no guarding, no rebound                tenderness   Extremities:  Trace to 1+ edema, no cyanosis, no             redness  CNS sedated intubated paralyzed  Skin no rashes no nodules  Musculoskeletal no cyanosis no clubbing      Results Review:        Results from last 7 days   Lab Units 10/05/21  0207 10/04/21  0140 10/04/21  0140 10/03/21  0349 10/03/21  0349   SODIUM mmol/L 127*  --  127*  --  125*   POTASSIUM mmol/L 4.0  --  4.3  --  4.0   CHLORIDE mmol/L 87*  --  89*  --  87*   CO2 mmol/L 23.2  --  20.6*  --  22.7   BUN mg/dL 48*  --  76*  --  64*   CREATININE mg/dL 2.77*  --  3.92*  --  3.49*   GLUCOSE mg/dL 167*   < > 203*   < > 209*   CALCIUM mg/dL 8.0*  --  8.0*  --  7.9*    < > = values in this interval not displayed.     Results from last 7 days   Lab Units 10/02/21  0749 10/01/21  0825   TROPONIN T ng/mL 0.056* 0.045*     Results from last 7 days   Lab Units 10/05/21  0207 10/04/21  0140 10/03/21  0349   WBC 10*3/mm3 8.83 7.64 5.91   HEMOGLOBIN g/dL 8.3* 8.9* 8.4*   HEMATOCRIT % 25.6* 27.3* 24.9*   PLATELETS 10*3/mm3 130* 125* 120*     Results from last " 7 days   Lab Units 10/05/21  0754 10/05/21  0207 10/04/21  1853 09/28/21 2002 09/28/21  1428   INR   --   --   --   --  0.91   APTT seconds 49.0* 64.7* 94.9*   < > 29.2    < > = values in this interval not displayed.                 Results from last 7 days   Lab Units 10/05/21  0437   PH, ARTERIAL pH units 7.310*   PO2 ART mm Hg 116.0*   PCO2, ARTERIAL mm Hg 53.5*   HCO3 ART mmol/L 26.9*       I reviewed the patient's new clinical results.  I personally viewed and interpreted the patient's chest x-ray.        Medication Review:   artificial tears, , Both Eyes, Q4H  aspirin, 81 mg, Oral, Daily  atorvastatin, 20 mg, Oral, Nightly  cefTRIAXone, 2 g, Intravenous, Q24H  chlorhexidine, 15 mL, Mouth/Throat, Q12H  clindamycin, 600 mg, Intravenous, Q8H  dexamethasone, 6 mg, Intravenous, Q24H  insulin aspart, 0-14 Units, Subcutaneous, Q6H  insulin detemir, 7 Units, Subcutaneous, Q12H  nystatin, , Topical, Q12H  pantoprazole, 40 mg, Intravenous, Q12H  polyethylene glycol, 17 g, Oral, Daily  senna-docusate sodium, 1 tablet, Oral, BID  sodium bicarbonate, 100 mEq, Intravenous, Once  sodium bicarbonate, 1,300 mg, Oral, TID  sodium chloride, 10 mL, Intravenous, Q12H  sodium chloride, 10 mL, Intravenous, Q12H  sodium chloride, 10 mL, Intravenous, Q12H  sodium chloride, 10 mL, Intravenous, Q12H  voriconazole, 200 mg, Per G Tube, Q12H        dexmedetomidine, 0.2-1.5 mcg/kg/hr, Last Rate: 0.5 mcg/kg/hr (10/04/21 1824)  fentaNYL Citrate,   fentaNYL Citrate,   heparin (porcine), 9.8 Units/kg/hr, Last Rate: 15.8 Units/kg/hr (10/05/21 0943)  Midazolam 1 mg/mL 100mL NS, 1-10 mg/hr, Last Rate: 6 mg/hr (10/05/21 0935)  norepinephrine, 0.02-0.3 mcg/kg/min, Last Rate: Stopped (10/05/21 0251)  propofol, 5-50 mcg/kg/min, Last Rate: 50 mcg/kg/min (10/05/21 8405)  vecuronium (NORCURON) infusion, 0.6-1.2 mcg/kg/min, Last Rate: 1.2 mcg/kg/min (10/05/21 2623)        ASSESSMENT:   1. Acute hypoxic and hypercapnic respiratory failure, on MV  9/14  2. COVID-19 pneumonia  3. Severe ARDS, PF ratio 97  4. RSV URTI  5. Hypotension, secondary sedation  6. Bacterial pneumonia (elevated procalcitonin) posterior consolidations  7. Severe Metabolic acidosis  8. Acute diabetic ketoacidosis, resolved  9. Acute renal failure  10. Acute pancreatitis, improved  11. Metabolic acidosis, secondary to renal failure  12. Poorly controlled DM - Hba1c - 11%  13. Candida albicans in sputum, probable contamination/colonization  14. Proteinuria  15. Propofol induced hypertriglyceridemia, mild  16. Anemia  17. Elevated d dimer -elevated - can certainly be seen in covid ards, neg studies so far, on heparin  18. Cavitary lung lesion     PLAN:  Respiratory status now more stable with heavy sedation and paralytics.  Continue with heavy sedated and paralyzed.   Wean down FiO2 to keep sats above 90%.  FiO2 requirements have improved since yesterday.  Once FiO2 at 60% requirement will start weaning down PEEP.  PO2 improved on ABG this morning  Once FiO2 60% or less and PEEP less than 15 we will try weaning off Nimbex drip first.  Cavitary lung lesion will need outpatient follow-up with repeat CT chest.  Antibiotics recommended for 3 weeks  Continue Decadron 6 mg daily and start weaning after day 10.  Discussed with nursing staff.  Patient critically ill  Will likely need trach and PEG down the road once FiO2 requirement less than 40%.                Indra Leblanc MD  10/05/21  11:22 EDT

## 2021-10-05 NOTE — NURSING NOTE
Wound consult for DTI to the helix of the Left ear. Wound presents with a deep purple discoloration with a small break in the epidermis. Wound bed is dry with a non blanchable jacques wound. New order for Venelex Q shift and PRN

## 2021-10-05 NOTE — PROGRESS NOTES
Antimicrobial Length of Therapy:    Day 2 of 10 ceftriaxone  Day 2 of 10 clindamycin  Day 6 of 21 voriconazole    Thank you.  Flakita Sprague, Pharm.D.  10/5/2021  09:32 EDT

## 2021-10-05 NOTE — PROGRESS NOTES
Nephrology Progress Note      Subjective         Objective       Vital signs :     Temp:  [97.8 °F (36.6 °C)-98.9 °F (37.2 °C)] 98.9 °F (37.2 °C)  Heart Rate:  [] 102  Resp:  [32-33] 32  BP: ()/(48-89) 97/55  FiO2 (%):  [80 %-90 %] 80 %      Intake/Output Summary (Last 24 hours) at 10/5/2021 0917  Last data filed at 10/5/2021 0500  Gross per 24 hour   Intake 3465.34 ml   Output 700 ml   Net 2765.34 ml       Physical Exam:    General: intubated on MV  Heart: Tele reveals sinus rhythm.   Lungs: Respirations appear to be regular, even and unlabored with no signs of respiratory distress. No audible wheezing.    Abdomen: no distension  Extremities: trace  Skin: No visible bleeding, bruising, or rash.  Neurologic: sedated        Laboratory Data :     Albumin Albumin   Date Value Ref Range Status   10/05/2021 2.76 (L) 3.50 - 5.20 g/dL Final   10/04/2021 2.49 (L) 3.50 - 5.20 g/dL Final   10/03/2021 2.48 (L) 3.50 - 5.20 g/dL Final      Magnesium No results found for: MG       PTH               No results found for: PTH    CBC and coagulation:  Results from last 7 days   Lab Units 10/05/21  0207 10/04/21  0140 10/03/21  0349 10/02/21  0100 10/01/21  0432 09/30/21  0539 09/29/21  0408 09/28/21  1428   PROCALCITONIN ng/mL  --   --  0.93*  --  1.40*  --  1.61*  --    CRP mg/dL 4.74* 2.78* 4.65*   < > 10.11*   < > 9.31*  --    WBC 10*3/mm3 8.83 7.64 5.91   < > 13.52*   < > 12.00*  --    HEMOGLOBIN g/dL 8.3* 8.9* 8.4*   < > 8.4*   < > 8.4*  --    HEMATOCRIT % 25.6* 27.3* 24.9*   < > 25.1*   < > 26.4*  --    MCV fL 92.4 91.9 90.9   < > 93.0   < > 96.4  --    MCHC g/dL 32.4 32.6 33.7   < > 33.5   < > 31.8  --    PLATELETS 10*3/mm3 130* 125* 120*   < > 139*   < > 143  --    INR   --   --   --   --   --   --   --  0.91   D DIMER QUANT MCGFEU/mL 9.17*  --  11.35*  --  17.05*  --  >20.00*  --     < > = values in this interval not displayed.     Acid/base balance:  Results from last 7 days   Lab Units 10/05/21  6911  10/04/21  0440 10/03/21  0907   PH, ARTERIAL pH units 7.310* 7.326* 7.397   PO2 ART mm Hg 116.0* 140.0* 58.5*   PCO2, ARTERIAL mm Hg 53.5* 43.2 40.7   HCO3 ART mmol/L 26.9* 22.5 25.0     Renal and electrolytes:  Results from last 7 days   Lab Units 10/05/21  0207 10/04/21  0140 10/03/21  0349 10/02/21  0100 10/02/21  0100 10/01/21  0432 10/01/21  0432   SODIUM mmol/L 127* 127* 125*  --  122*  --  127*   POTASSIUM mmol/L 4.0 4.3 4.0   < > 4.0   < > 4.0   CHLORIDE mmol/L 87* 89* 87*   < > 84*   < > 88*   CO2 mmol/L 23.2 20.6* 22.7   < > 20.7*   < > 20.4*   BUN mg/dL 48* 76* 64*   < > 42*   < > 61*   CREATININE mg/dL 2.77* 3.92* 3.49*  --  2.56*  --  3.70*   EGFR IF NONAFRICN AM mL/min/1.73 25* 16* 19*   < > 27*   < > 18*   CALCIUM mg/dL 8.0* 8.0* 7.9*   < > 7.7*   < > 7.8*    < > = values in this interval not displayed.     Estimated Creatinine Clearance: 38.3 mL/min (A) (by C-G formula based on SCr of 2.77 mg/dL (H)).    Liver and pancreatic function:  Results from last 7 days   Lab Units 10/05/21  0207 10/04/21  0140 10/03/21  0349   ALBUMIN g/dL 2.76* 2.49* 2.48*   BILIRUBIN mg/dL 0.4 0.3 0.2   ALK PHOS U/L 80 85 82   AST (SGOT) U/L 10 8 9   ALT (SGPT) U/L 6 6 5         Cardiac:      Liver and pancreatic function:  Results from last 7 days   Lab Units 10/05/21  0207 10/04/21  0140 10/03/21  0349   ALBUMIN g/dL 2.76* 2.49* 2.48*   BILIRUBIN mg/dL 0.4 0.3 0.2   ALK PHOS U/L 80 85 82   AST (SGOT) U/L 10 8 9   ALT (SGPT) U/L 6 6 5       Medications :     artificial tears, , Both Eyes, Q4H  aspirin, 81 mg, Oral, Daily  atorvastatin, 20 mg, Oral, Nightly  cefTRIAXone, 2 g, Intravenous, Q24H  chlorhexidine, 15 mL, Mouth/Throat, Q12H  clindamycin, 600 mg, Intravenous, Q8H  dexamethasone, 6 mg, Intravenous, Q24H  insulin aspart, 0-14 Units, Subcutaneous, Q6H  insulin detemir, 7 Units, Subcutaneous, Q12H  nystatin, , Topical, Q12H  pantoprazole, 40 mg, Intravenous, Q12H  polyethylene glycol, 17 g, Oral, Daily  senna-docusate  sodium, 1 tablet, Oral, BID  sodium bicarbonate, 100 mEq, Intravenous, Once  sodium bicarbonate, 1,300 mg, Oral, TID  sodium chloride, 10 mL, Intravenous, Q12H  sodium chloride, 10 mL, Intravenous, Q12H  sodium chloride, 10 mL, Intravenous, Q12H  sodium chloride, 10 mL, Intravenous, Q12H  voriconazole, 200 mg, Per G Tube, Q12H      dexmedetomidine, 0.2-1.5 mcg/kg/hr, Last Rate: 0.5 mcg/kg/hr (10/04/21 1824)  fentaNYL Citrate,   fentaNYL Citrate,   heparin (porcine), 9.8 Units/kg/hr, Last Rate: 13.8 Units/kg/hr (10/05/21 0305)  Midazolam 1 mg/mL 100mL NS, 1-10 mg/hr, Last Rate: 6 mg/hr (10/04/21 8758)  norepinephrine, 0.02-0.3 mcg/kg/min, Last Rate: Stopped (10/05/21 0251)  propofol, 5-50 mcg/kg/min, Last Rate: 50 mcg/kg/min (10/05/21 0522)  vecuronium (NORCURON) infusion, 0.6-1.2 mcg/kg/min, Last Rate: 1.2 mcg/kg/min (10/05/21 0523)        Assessment/Plan     1. JACKY, oliguric started on dialysis on 9/20/21  3. Anion gap metabolic acidosis, better  4.  Proteinuria with hypocomplementemia but negative ANCA and negative YUDELKA  5. Hyponatremia mild likely hypervolumic  6.  Covid pneumonia  7.  ARDS    Pt had dialysis yesterday, uneventful. Has about 700ml urine out. Continue watchful waiting for renal recovery while supporting with dialysis as needed. No dialysis today.   Mild hyponatremia, to monitor    JACKY likely due to ATN  Baseline Cr 0.7, admitted with 2.8  [place] urine, no obstruction on renal USG  -strict I/O  -avoid nephrotoxic agents, and adjust medications according to eGFR    Sb Sullivan MD  10/05/21  09:17 EDT

## 2021-10-05 NOTE — PLAN OF CARE
Problem: Skin Injury Risk Increased  Goal: Skin Health and Integrity  Outcome: Ongoing, Not Progressing     Problem: Fall Injury Risk  Goal: Absence of Fall and Fall-Related Injury  Outcome: Ongoing, Not Progressing     Problem: Adult Inpatient Plan of Care  Goal: Plan of Care Review  Outcome: Ongoing, Not Progressing  Goal: Patient-Specific Goal (Individualized)  Outcome: Ongoing, Not Progressing  Goal: Absence of Hospital-Acquired Illness or Injury  Outcome: Ongoing, Not Progressing  Goal: Optimal Comfort and Wellbeing  Outcome: Ongoing, Not Progressing  Intervention: Provide Person-Centered Care  Recent Flowsheet Documentation  Taken 10/5/2021 0800 by Lilly Nuñez, RN  Trust Relationship/Rapport:   care explained   reassurance provided  Goal: Readiness for Transition of Care  Outcome: Ongoing, Not Progressing     Problem: Wound  Goal: Optimal Wound Healing  Outcome: Ongoing, Not Progressing   Goal Outcome Evaluation:                  No

## 2021-10-06 NOTE — PROGRESS NOTES
Nephrology Progress Note      Subjective     Pt had dialysis uneventful today, remains on vent    Objective       Vital signs :     Temp:  [97.7 °F (36.5 °C)-98.1 °F (36.7 °C)] 98.1 °F (36.7 °C)  Heart Rate:  [] 105  Resp:  [32] 32  BP: ()/(45-85) 87/57  FiO2 (%):  [60 %-70 %] 60 %      Intake/Output Summary (Last 24 hours) at 10/6/2021 1234  Last data filed at 10/6/2021 1146  Gross per 24 hour   Intake 2079.62 ml   Output 3475 ml   Net -1395.38 ml       Physical Exam:    General: intubated on MV  Heart: Tele reveals sinus rhythm.   Lungs: Respirations appear to be regular, even and unlabored with no signs of respiratory distress. No audible wheezing.    Abdomen: no distension  Extremities: trace  Skin: No visible bleeding, bruising, or rash.  Neurologic: sedated        Laboratory Data :     Albumin Albumin   Date Value Ref Range Status   10/05/2021 2.88 (L) 3.50 - 5.20 g/dL Final   10/05/2021 2.76 (L) 3.50 - 5.20 g/dL Final   10/04/2021 2.49 (L) 3.50 - 5.20 g/dL Final      Magnesium No results found for: MG       PTH               No results found for: PTH    CBC and coagulation:  Results from last 7 days   Lab Units 10/05/21  2233 10/05/21  0207 10/04/21  0140 10/03/21  0349 10/03/21  0349 10/02/21  0100 10/01/21  0432   PROCALCITONIN ng/mL  --  0.85*  --   --  0.93*  --  1.40*   CRP mg/dL 7.74* 4.74* 2.78*   < > 4.65*   < > 10.11*   WBC 10*3/mm3 9.11 8.83 7.64   < > 5.91   < > 13.52*   HEMOGLOBIN g/dL 8.5* 8.3* 8.9*   < > 8.4*   < > 8.4*   HEMATOCRIT % 25.8* 25.6* 27.3*   < > 24.9*   < > 25.1*   MCV fL 93.5 92.4 91.9   < > 90.9   < > 93.0   MCHC g/dL 32.9 32.4 32.6   < > 33.7   < > 33.5   PLATELETS 10*3/mm3 156 130* 125*   < > 120*   < > 139*   D DIMER QUANT MCGFEU/mL  --  9.17*  --   --  11.35*  --  17.05*    < > = values in this interval not displayed.     Acid/base balance:  Results from last 7 days   Lab Units 10/06/21  0432 10/05/21  0437 10/04/21  0440   PH, ARTERIAL pH units 7.305* 7.310*  7.326*   PO2 ART mm Hg 97.6 116.0* 140.0*   PCO2, ARTERIAL mm Hg 53.0* 53.5* 43.2   HCO3 ART mmol/L 26.4* 26.9* 22.5     Renal and electrolytes:  Results from last 7 days   Lab Units 10/05/21  2233 10/05/21  0207 10/04/21  0140 10/03/21  0349 10/03/21  0349 10/02/21  0100 10/02/21  0100   SODIUM mmol/L 127* 127* 127*  --  125*  --  122*   POTASSIUM mmol/L 4.7 4.0 4.3   < > 4.0   < > 4.0   CHLORIDE mmol/L 86* 87* 89*   < > 87*   < > 84*   CO2 mmol/L 23.7 23.2 20.6*   < > 22.7   < > 20.7*   BUN mg/dL 63* 48* 76*   < > 64*   < > 42*   CREATININE mg/dL 2.89* 2.77* 3.92*  --  3.49*  --  2.56*   EGFR IF NONAFRICN AM mL/min/1.73 23* 25* 16*   < > 19*   < > 27*   CALCIUM mg/dL 8.5* 8.0* 8.0*   < > 7.9*   < > 7.7*    < > = values in this interval not displayed.     Estimated Creatinine Clearance: 36.7 mL/min (A) (by C-G formula based on SCr of 2.89 mg/dL (H)).    Liver and pancreatic function:  Results from last 7 days   Lab Units 10/05/21  2233 10/05/21  0207 10/04/21  0140   ALBUMIN g/dL 2.88* 2.76* 2.49*   BILIRUBIN mg/dL 0.3 0.4 0.3   ALK PHOS U/L 84 80 85   AST (SGOT) U/L 9 10 8   ALT (SGPT) U/L 6 6 6         Cardiac:      Liver and pancreatic function:  Results from last 7 days   Lab Units 10/05/21  2233 10/05/21  0207 10/04/21  0140   ALBUMIN g/dL 2.88* 2.76* 2.49*   BILIRUBIN mg/dL 0.3 0.4 0.3   ALK PHOS U/L 84 80 85   AST (SGOT) U/L 9 10 8   ALT (SGPT) U/L 6 6 6       Medications :     artificial tears, , Both Eyes, Q4H  aspirin, 81 mg, Oral, Daily  atorvastatin, 20 mg, Oral, Nightly  castor oil-balsam peru, 1 application, Topical, Q12H  cefTRIAXone, 2 g, Intravenous, Q24H  chlorhexidine, 15 mL, Mouth/Throat, Q12H  clindamycin, 600 mg, Intravenous, Q8H  dexamethasone, 6 mg, Intravenous, Q24H  insulin aspart, 0-14 Units, Subcutaneous, Q6H  insulin detemir, 7 Units, Subcutaneous, Q12H  nystatin, , Topical, Q12H  pantoprazole, 40 mg, Intravenous, Q12H  polyethylene glycol, 17 g, Oral, Daily  senna-docusate sodium, 1  tablet, Oral, BID  sodium bicarbonate, 100 mEq, Intravenous, Once  sodium bicarbonate, 1,300 mg, Oral, TID  sodium chloride, 10 mL, Intravenous, Q12H  sodium chloride, 10 mL, Intravenous, Q12H  sodium chloride, 10 mL, Intravenous, Q12H  sodium chloride, 10 mL, Intravenous, Q12H  voriconazole, 200 mg, Per G Tube, Q12H      dexmedetomidine, 0.2-1.5 mcg/kg/hr, Last Rate: Stopped (10/06/21 0108)  fentaNYL Citrate,   heparin (porcine), 9.8 Units/kg/hr, Last Rate: 15.686 Units/kg/hr (10/06/21 0931)  Midazolam 1 mg/mL 100mL NS, 1-10 mg/hr, Last Rate: 4 mg/hr (10/06/21 0613)  norepinephrine, 0.02-0.3 mcg/kg/min, Last Rate: 0.1 mcg/kg/min (10/06/21 1118)  propofol, 5-50 mcg/kg/min, Last Rate: 50 mcg/kg/min (10/06/21 1138)  vecuronium (NORCURON) infusion, 0.6-1.2 mcg/kg/min, Last Rate: Stopped (10/06/21 1201)        Assessment/Plan     1. JACKY, oliguric started on dialysis on 9/20/21  3. Anion gap metabolic acidosis, better  4.  Proteinuria with hypocomplementemia but negative ANCA and negative YUDELKA  5. Hyponatremia mild likely hypervolumic  6.  Covid pneumonia  7.  ARDS    Continue supporting  With dialysis as clinically indicated.   Mild hyponatremia, likely to improve with UF today    JACKY likely due to ATN  Baseline Cr 0.7, admitted with 2.8  [place] urine, no obstruction on renal USG  -strict I/O  -avoid nephrotoxic agents, and adjust medications according to eGFR    Sb Sullivan MD  10/06/21  12:34 EDT

## 2021-10-06 NOTE — PLAN OF CARE
Problem: Skin Injury Risk Increased  Goal: Skin Health and Integrity  Outcome: Unable to Meet, Plan Revised  Intervention: Optimize Skin Protection  Recent Flowsheet Documentation  Taken 10/6/2021 0800 by Jaqui Noriega RN  Pressure Reduction Techniques:   heels elevated off bed   pressure points protected   positioned off wounds  Head of Bed (HOB): HOB elevated  Pressure Reduction Devices:   heel offloading device utilized   positioning supports utilized   foam padding utilized  Skin Protection: pectin skin barriers applied  Intervention: Promote and Optimize Oral Intake  Recent Flowsheet Documentation  Taken 10/6/2021 0800 by Jaqui Noriega RN  Oral Nutrition Promotion: rest periods promoted     Problem: Fall Injury Risk  Goal: Absence of Fall and Fall-Related Injury  Outcome: Unable to Meet, Plan Revised  Intervention: Identify and Manage Contributors to Fall Injury Risk  Recent Flowsheet Documentation  Taken 10/6/2021 0800 by Jaqui Noriega RN  Medication Review/Management: medications reviewed  Intervention: Promote Injury-Free Environment  Recent Flowsheet Documentation  Taken 10/6/2021 0800 by Jaqui Noriega RN  Safety Promotion/Fall Prevention:   fall prevention program maintained   safety round/check completed     Problem: Adult Inpatient Plan of Care  Goal: Plan of Care Review  Outcome: Unable to Meet, Plan Revised  Goal: Patient-Specific Goal (Individualized)  Outcome: Unable to Meet, Plan Revised  Goal: Absence of Hospital-Acquired Illness or Injury  Outcome: Unable to Meet, Plan Revised  Intervention: Identify and Manage Fall Risk  Recent Flowsheet Documentation  Taken 10/6/2021 0800 by Jaqui Noriega RN  Safety Promotion/Fall Prevention:   fall prevention program maintained   safety round/check completed  Intervention: Prevent Skin Injury  Recent Flowsheet Documentation  Taken 10/6/2021 0800 by Jaqui Noriega RN  Body Position: side-lying, left  Skin Protection:  pectin skin barriers applied  Intervention: Prevent and Manage VTE (venous thromboembolism) Risk  Recent Flowsheet Documentation  Taken 10/6/2021 0800 by Jaqui Noriega RN  VTE Prevention/Management: (see MAR) other (see comments)  Intervention: Prevent Infection  Recent Flowsheet Documentation  Taken 10/6/2021 0800 by Jaqui Noriega RN  Infection Prevention:   environmental surveillance performed   equipment surfaces disinfected   rest/sleep promoted  Goal: Optimal Comfort and Wellbeing  Outcome: Unable to Meet, Plan Revised  Intervention: Provide Person-Centered Care  Recent Flowsheet Documentation  Taken 10/6/2021 0800 by Jaqui Noriega RN  Trust Relationship/Rapport: care explained  Goal: Readiness for Transition of Care  Outcome: Unable to Meet, Plan Revised     Problem: Wound  Goal: Optimal Wound Healing  Outcome: Unable to Meet, Plan Revised  Intervention: Promote Effective Wound Healing  Recent Flowsheet Documentation  Taken 10/6/2021 0800 by Jaqui Noriega RN  Oral Nutrition Promotion: rest periods promoted  Pain Management Interventions:   around-the-clock dosing utilized   care clustered   quiet environment facilitated  Sleep/Rest Enhancement: awakenings minimized   Goal Outcome Evaluation:                 Problem: Skin Injury Risk Increased  Goal: Skin Health and Integrity  Outcome: Unable to Meet, Plan Revised     Problem: Fall Injury Risk  Goal: Absence of Fall and Fall-Related Injury  Outcome: Unable to Meet, Plan Revised     Problem: Adult Inpatient Plan of Care  Goal: Plan of Care Review  Outcome: Unable to Meet, Plan Revised  Goal: Patient-Specific Goal (Individualized)  Outcome: Unable to Meet, Plan Revised  Goal: Absence of Hospital-Acquired Illness or Injury  Outcome: Unable to Meet, Plan Revised  Goal: Optimal Comfort and Wellbeing  Outcome: Unable to Meet, Plan Revised  Goal: Readiness for Transition of Care  Outcome: Unable to Meet, Plan Revised     Problem:  Wound  Goal: Optimal Wound Healing  Outcome: Unable to Meet, Plan Revised

## 2021-10-06 NOTE — CASE MANAGEMENT/SOCIAL WORK
Discharge Planning Assessment   [facility]     Patient Name: Martinez Mckenna  MRN: [id number]  Today's Date: 10/6/2021    Admit Date: 9/14/2021    Discharge Plan     Row Name 10/06/21 1055       Plan    Plan Pt admitted 9/14/21. Pt remains intubated and sedated. Palliative following for GOC. Per MD note, pt will likely be changed to comfort measures if no improvement today. Pt is from home and does not utilize home health services or DME. SS following.     HERNESTO Duggan

## 2021-10-06 NOTE — PROGRESS NOTES
PROGRESS NOTE         Patient Identification:  Name:  Martinez Mckenna  Age:  51 y.o.  Sex:  male  :  1970  MRN:  [id number]  Visit Number:  [id number]  Primary Care Provider:  Rafal Casillas MD         LOS: 21 days       ----------------------------------------------------------------------------------------------------------------------  Subjective       Chief Complaints:    Respiratory Distress        Interval History:      Case discussed with primary RN.  No new events overnight.  The patient is tolerating the supine position for now.  Sedation has been decreased.  Patient remains sedated and intubated at an improved oxygen requirement of 70% FiO2.  Afebrile.  CRP worsened at 7.74.  White count normal at 9.11.  Chest x-ray from 10/6/2021 pending interpretation.      Review of Systems:    Unable to obtain.  Intubated and sedated.  ----------------------------------------------------------------------------------------------------------------------      Objective       Current Hospital Meds:  artificial tears, , Both Eyes, Q4H  aspirin, 81 mg, Oral, Daily  atorvastatin, 20 mg, Oral, Nightly  castor oil-balsam peru, 1 application, Topical, Q12H  cefTRIAXone, 2 g, Intravenous, Q24H  chlorhexidine, 15 mL, Mouth/Throat, Q12H  clindamycin, 600 mg, Intravenous, Q8H  dexamethasone, 6 mg, Intravenous, Q24H  insulin aspart, 0-14 Units, Subcutaneous, Q6H  insulin detemir, 7 Units, Subcutaneous, Q12H  nystatin, , Topical, Q12H  pantoprazole, 40 mg, Intravenous, Q12H  polyethylene glycol, 17 g, Oral, Daily  senna-docusate sodium, 1 tablet, Oral, BID  sodium bicarbonate, 100 mEq, Intravenous, Once  sodium bicarbonate, 1,300 mg, Oral, TID  sodium chloride, 1,000 mL, Intravenous, Once in Dialysis  sodium chloride, 10 mL, Intravenous, Q12H  sodium chloride, 10 mL, Intravenous, Q12H  sodium chloride, 10 mL, Intravenous, Q12H  sodium chloride, 10 mL, Intravenous, Q12H  voriconazole, 200 mg, Per G Tube,  Q12H      dexmedetomidine, 0.2-1.5 mcg/kg/hr, Last Rate: Stopped (10/06/21 0108)  fentaNYL Citrate,   heparin (porcine), 9.8 Units/kg/hr, Last Rate: 15.686 Units/kg/hr (10/06/21 0931)  Midazolam 1 mg/mL 100mL NS, 1-10 mg/hr, Last Rate: 4 mg/hr (10/06/21 0613)  norepinephrine, 0.02-0.3 mcg/kg/min, Last Rate: 0.11 mcg/kg/min (10/06/21 1118)  propofol, 5-50 mcg/kg/min, Last Rate: 50 mcg/kg/min (10/06/21 0931)  vecuronium (NORCURON) infusion, 0.6-1.2 mcg/kg/min, Last Rate: 1.2 mcg/kg/min (10/05/21 2050)      ----------------------------------------------------------------------------------------------------------------------    Vital Signs:  Temp:  [97.7 °F (36.5 °C)-98.3 °F (36.8 °C)] 98.1 °F (36.7 °C)  Heart Rate:  [] 105  Resp:  [32] 32  BP: ()/(45-85) 87/57  FiO2 (%):  [60 %-70 %] 60 %  Mean Arterial Pressure (Non-Invasive) for the past 24 hrs (Last 3 readings):   Noninvasive MAP (mmHg)   10/06/21 1102 69   10/06/21 1047 83   10/06/21 1032 82     SpO2 Percentage    10/06/21 1032 10/06/21 1047 10/06/21 1102   SpO2: 94% 93% 92%     SpO2:  [88 %-100 %] 92 %  on   ;   Device (Oxygen Therapy): ventilator    Body mass index is 33.76 kg/m².  Wt Readings from Last 3 Encounters:   10/06/21 104 kg (228 lb 11.2 oz)   08/04/21 95.3 kg (210 lb)        Intake/Output Summary (Last 24 hours) at 10/6/2021 1133  Last data filed at 10/6/2021 0633  Gross per 24 hour   Intake 2179.62 ml   Output 975 ml   Net 1204.62 ml     NPO Diet  ----------------------------------------------------------------------------------------------------------------------      Physical Exam:    Deferred due to COVID-19 isolation.  ----------------------------------------------------------------------------------------------------------------------  Results from last 7 days   Lab Units 10/02/21  0749 10/01/21  0825   TROPONIN T ng/mL 0.056* 0.045*             Results from last 7 days   Lab Units 10/06/21  0432   PH, ARTERIAL pH units 7.305*   PO2  ART mm Hg 97.6   PCO2, ARTERIAL mm Hg 53.0*   HCO3 ART mmol/L 26.4*     Results from last 7 days   Lab Units 10/05/21  2233 10/05/21  0207 10/04/21  0140   CRP mg/dL 7.74* 4.74* 2.78*   WBC 10*3/mm3 9.11 8.83 7.64   HEMOGLOBIN g/dL 8.5* 8.3* 8.9*   HEMATOCRIT % 25.8* 25.6* 27.3*   MCV fL 93.5 92.4 91.9   MCHC g/dL 32.9 32.4 32.6   PLATELETS 10*3/mm3 156 130* 125*     Results from last 7 days   Lab Units 10/05/21  2233 10/05/21  0207 10/04/21  0140   SODIUM mmol/L 127* 127* 127*   POTASSIUM mmol/L 4.7 4.0 4.3   CHLORIDE mmol/L 86* 87* 89*   CO2 mmol/L 23.7 23.2 20.6*   BUN mg/dL 63* 48* 76*   CREATININE mg/dL 2.89* 2.77* 3.92*   EGFR IF NONAFRICN AM mL/min/1.73 23* 25* 16*   CALCIUM mg/dL 8.5* 8.0* 8.0*   GLUCOSE mg/dL 245* 167* 203*   ALBUMIN g/dL 2.88* 2.76* 2.49*   BILIRUBIN mg/dL 0.3 0.4 0.3   ALK PHOS U/L 84 80 85   AST (SGOT) U/L 9 10 8   ALT (SGPT) U/L 6 6 6   Estimated Creatinine Clearance: 36.7 mL/min (A) (by C-G formula based on SCr of 2.89 mg/dL (H)).  No results found for: AMMONIA    Glucose   Date/Time Value Ref Range Status   10/06/2021 0635 232 (H) 70 - 130 mg/dL Final     Comment:     Meter: [id number] : [id number] Jaqui Cou   10/05/2021 2358 277 (H) 70 - 130 mg/dL Final     Comment:     Meter: [id number] : [id number] Jaqui Cou   10/05/2021 1654 307 (H) 70 - 130 mg/dL Final     Comment:     Meter: [id number] : [id number] Savannah serna   10/05/2021 1240 328 (H) 70 - 130 mg/dL Final     Comment:     Meter: [id number] : [id number] Galileokorin Babcocklawanda GUTIERREZ   10/05/2021 0513 221 (H) 70 - 130 mg/dL Final     Comment:     Meter: [id number] : [id number] VALENTINE COPPOCK   10/04/2021 2325 148 (H) 70 - 130 mg/dL Final     Comment:     Meter: [id number] : [id number] VALENTINE COPPOCK   10/04/2021 1746 229 (H) 70 - 130 mg/dL Final     Comment:     Meter: [id number] : [id number] Savannah serna   10/04/2021 1122 224 (H) 70 - 130 mg/dL Final     Comment:     Meter: [id number] :  [id number] Rubén Kurtz     Lab Results   Component Value Date    HGBA1C 11.20 (H) 09/14/2021     Lab Results   Component Value Date    TSH 0.761 09/14/2021    FREET4 1.08 09/14/2021       Blood Culture   Date Value Ref Range Status   09/14/2021 No growth at 2 days  Preliminary   09/14/2021 No growth at 2 days  Preliminary     No results found for: URINECX  No results found for: WOUNDCX  No results found for: STOOLCX  No results found for: RESPCX  Pain Management Panel    There is no flowsheet data to display.           ----------------------------------------------------------------------------------------------------------------------  Imaging Results (Last 24 Hours)       Procedure Component Value Units Date/Time    XR Chest 1 View [[id number]] Resulted: 10/06/21 0815     Updated: 10/06/21 1032            ----------------------------------------------------------------------------------------------------------------------    Assessment/Plan       Assessment/Plan     ASSESSMENT:    1.  Septic shock with lactic acid greater than 4 on admission  2.  Covid pneumonia  3.  Aspiration pneumonia    PLAN:    Case discussed with primary RN.  No new events overnight.  The patient is tolerating the supine position for now.  Sedation has been decreased.  Patient remains sedated and intubated at an improved oxygen requirement of 70% FiO2.  Afebrile.  CRP worsened at 7.74.  White count normal at 9.11.  Chest x-ray from 10/6/2021 pending interpretation.    AFB culture from 10/4/2021 still processing.  Chest x-ray from 10/5/2021 reports stable bilateral airspace disease.    Procalcitonin from 10/1/2021 improved at 1.40.  Fungal antibody panel negative.  QuantiFERON-TB gold is indeterminate.  Aspergillus galactomannan antigen positive at 1.88.      Respiratory culture from 9/28/2021 has finalized with light growth of yeast, not Candida albicans.      CT chest with PE protocol on 9/26/2021 shows markedly limited examination with no  gross pulmonary emboli.  Pneumomediastinum.  Correlate with ventilation settings.  Significant bilateral groundglass infiltrates with lower lobe consolidation and new cavitary process with filling defect in the right upper lobe.  Correlate for superimposed opportunistic infection.  Marked abnormalities in the upper abdomen suspicious for pancreatitis.  Incidental fluid-filled colon loops that may be correlated clinically with diarrhea.  Moderate steatosis of the liver, correlate with LFTs.    Legionella screen negative.  Strep pneumo screen negative. Respiratory culture from 9/15/2021 finalized as light growth of Candida albicans. Respiratory panel from 9/15/2021 detected RSV.  Blood cultures from 9/14/2021 finalized as no growth.  MRSA screen was negative on 9/15/2021.    In the setting of cavitary lesion and indeterminant QuantiFERON TB Gold there is strong indication for bronchoscopy with BAL for AFB.  Continue to recommend bronchoscopy when feasible.    Pulmonology note reviewed, in regard to cavitary lesion, recommendation to follow-up with outpatient imaging in 6 to 8 weeks and suggested antibiotic course x3 weeks.     Recommend to continue voriconazole course.  Rocephin and clindamycin courses recommend to continue through October 14, 2021.  In keeping with pulmonary recommendations, would recommend repeat CT chest in the next week.    We will continue to follow closely and adjust coverage as needed.    Code Status:   Code Status and Medical Interventions:   Ordered at: 09/28/21 1631     Limited Support to NOT Include:    Cardioversion/Defibrillation     Level Of Support Discussed With:    Next of Kin (If No Surrogate)     Code Status:    No CPR     Medical Interventions (Level of Support Prior to Arrest):    Limited     Scribed for Chloe Garvey MD by ALYSE Thompson. 10/6/2021  11:33 EDT       ALYSE Thompson  10/06/21  11:33 EDT    Physician Attestation:    The documentation recorded by the  scribe accurately reflects the service I personally performed and the decisions made by me.    Chloe Garvey MD  10/06/21  11:33 EDT

## 2021-10-06 NOTE — NURSING NOTE
No acute changes throughout the shift. Patient remains on Vec, Fentanyl, Versed, and Propofol for sedation. Patient is currently prone and to be flipped at 0730. [facility].     Jaqui Santamaria RN

## 2021-10-06 NOTE — PROGRESS NOTES
"Palliative Care Daily Progress Note     S: Medical record reviewed, followed up with Primary RN Lilly and Dr. Monte regarding patient's condition. Pt is in COVID isolation,  Sedated on the ventilator and does not look to be in any distress at this time.      O:   Palliative Performance Scale Score: 30%   BP (!) 87/57   Pulse 105   Temp 98.1 °F (36.7 °C) (Oral)   Resp (!) 32   Ht 175.3 cm (69.02\")   Wt 104 kg (228 lb 11.2 oz)   SpO2 92%   BMI 33.76 kg/m²     Intake/Output Summary (Last 24 hours) at 10/6/2021 1448  Last data filed at 10/6/2021 1146  Gross per 24 hour   Intake 2079.62 ml   Output 3475 ml   Net -1395.38 ml       PE:  COVID RESTRICTIONS      Meds: Reviewed and changes noted.    Labs:   Results from last 7 days   Lab Units 10/05/21  2233   WBC 10*3/mm3 9.11   HEMOGLOBIN g/dL 8.5*   HEMATOCRIT % 25.8*   PLATELETS 10*3/mm3 156     Results from last 7 days   Lab Units 10/05/21  2233   SODIUM mmol/L 127*   POTASSIUM mmol/L 4.7   CHLORIDE mmol/L 86*   CO2 mmol/L 23.7   BUN mg/dL 63*   CREATININE mg/dL 2.89*   GLUCOSE mg/dL 245*   CALCIUM mg/dL 8.5*     Results from last 7 days   Lab Units 10/05/21  2233   SODIUM mmol/L 127*   POTASSIUM mmol/L 4.7   CHLORIDE mmol/L 86*   CO2 mmol/L 23.7   BUN mg/dL 63*   CREATININE mg/dL 2.89*   CALCIUM mg/dL 8.5*   BILIRUBIN mg/dL 0.3   ALK PHOS U/L 84   ALT (SGPT) U/L 6   AST (SGOT) U/L 9   GLUCOSE mg/dL 245*     Imaging Results (Last 72 Hours)     Procedure Component Value Units Date/Time    XR Chest 1 View [[id number]] Collected: 10/06/21 1322     Updated: 10/06/21 1325    Narrative:      EXAM:    XR Chest, 1 View     EXAM DATE:    10/6/2021 12:03 PM     CLINICAL HISTORY:    respiratory failure; E13.11-Other specified diabetes mellitus with  ketoacidosis with coma; J96.01-Acute respiratory failure with hypoxia;  U07.1-COVID-19; N17.9-Acute kidney failure, unspecified; K85.90-Acute  pancreatitis without necrosis or infection, unspecified;  K29.90-Gastroduodenitis, " unspecified, without bleeding;  J15.9-Unspecified bacterial pneumonia; E83.39-Other disorders of  phosphorus me     TECHNIQUE:    Frontal view of the chest.     COMPARISON:    10/05/2021     FINDINGS:    Lungs:  Patchy bilateral airspace disease stable.  Lung volumes are  stable.    Pleural space:  Unremarkable.  No pneumothorax.    Heart:  Unremarkable.  No cardiomegaly.    Mediastinum:  Unremarkable.    Bones/joints:  Unremarkable.    Tubes, lines and devices:  ET tube with tip at level of clavicles.  2.  Right-sided IJ deep lines are stable in positioning.  NG tube extends  into the stomach.       Impression:      1.  Positioning of support devices detailed above.  2.  Stable bilateral airspace disease.     This report was finalized on 10/6/2021 1:23 PM by Dr. James Thomason MD.       XR Chest 1 View [[id number]] Collected: 10/05/21 0900     Updated: 10/05/21 0903    Narrative:      EXAM:    XR Chest, 1 View     EXAM DATE:    10/5/2021 6:23 AM     CLINICAL HISTORY:    respiratory failure; E13.11-Other specified diabetes mellitus with  ketoacidosis with coma; J96.01-Acute respiratory failure with hypoxia;  U07.1-COVID-19; N17.9-Acute kidney failure, unspecified; K85.90-Acute  pancreatitis without necrosis or infection, unspecified;  K29.90-Gastroduodenitis, unspecified, without bleeding;  J15.9-Unspecified bacterial pneumonia; E83.39-Other disorders of  phosphorus me     TECHNIQUE:    Frontal view of the chest.     COMPARISON:    10/04/2021     FINDINGS:    Lungs:  Patchy bilateral airspace disease stable.    Pleural space:  No pneumothorax identified.    Heart:  Unremarkable.  No cardiomegaly.    Mediastinum:  Unremarkable.    Bones/joints:  Unremarkable.    Vasculature:  Right IJ deep line with tip in the distal SVC region.    Tubes, lines and devices:  ET tube tip just below clavicles.  Right IJ  dialysis catheter with tip at the distal SVC region.  NG tube extends  into the stomach.       Impression:      1.   "Stable bilateral airspace disease.  2.  Positioning of support devices detailed above.     This report was finalized on 10/5/2021 9:00 AM by Dr. James Thomason MD.       XR Chest 1 View [[id number]] Collected: 10/04/21 0051     Updated: 10/04/21 0053    Narrative:      CR Chest 1 Vw    INDICATION:   Possible aspiration. Covid 19.     COMPARISON:    10/3/2021 at 0854 hours    FINDINGS:  Single portable AP view(s) of the chest.    Heart size is stable. Endotracheal tube is in good position in the mid to lower trachea. NG tube tip in the stomach. Right IJ line tips are in the SVC. No pneumothorax is seen. Lung volumes remain quite low. Infiltrates in both lungs are stable.       Impression:      Stable appearance of the chest.    Signer Name: Shawn Crow MD   Signed: 10/4/2021 12:51 AM   Workstation Name: [facility]    [facility]            Diagnostics: Reviewed    A: Martinez Mckenna is a 51 y.o. yo male  in a unresponsive state. He has a past medical history of diabetes who presented via EMS unresponsive from home. Per EMS report obtained from patient's father, patient was diagnosed with COVID-19 pneumonia 1 week ago and has been unresponsive essentially since that time. Glucose reading was \"high\" in the field. Upon arrival to the ED his GCS was 3; he was unresponsive to painful stimuli. He had unobtainable BP but a palpable femoral pulse. He was critically hypotensive. He exhibited rapid shallow breathing. ED workup revealed DKA, hyperkalemia, JACKY, metabolic acidosis, and leukocytosis. Troponin was negative x2, while BNP was mildly elevated but this could be falsely high from renal failure. Lipase and amylase were also significantly elevated. CT imaging showed COVID-19 pneumonia with superimposed bacterial pneumonia, along with probable changes of pancreatitis, and inflammation surrounding pancreas. Patient is currently on precedex and fentanyl for sedation, along with IV insulin drip for " DKA. Antibiotics have been added to cover for superimposed bacterial pneumonia mentioned on CT imaging. She has been admitted to the CCU for further management.   Today 10/6/2021  Pt was sedated on the ventilator FiO2 60% and peep of 12 and saturating 92%. Pt is afebrile, , BP of 87/57    P:  After family meeting yesterday with Isela and Dr. Monte and long discussion amongst themselves, patients father Román and sister Cari have decided to extubate him and make him comfort measures as they feel that Francisco Javier would not want this. Pts sister and significant other came to see patient before pt was extubated. Plan is to make pt comfortable.      We will continue to follow along. Please do not hesitate to contact us regarding further sx mgmt or GOC needs, including after hours or on weekends via our on call provider at [phone].     Dora Fleming, APRN    10/6/2021

## 2021-10-06 NOTE — PROGRESS NOTES
[facility]         Dr Indra Cuevaied   LOS: 21 days   Patient Care Team:  Rafal Casillas MD as PCP - General (Family Medicine)    Chief Complaint: Vent management with COVID-19 pneumonia ARDS DKA renal failure cavitary lung lesion    Interval History: Sedated intubated and paralyzed.  Currently on fentanyl propofol Versed Precedex for sedation.  Vecuronium for paralytics.  Levophed and heparin drips also.  FiO2 70% with PEEP of 14.      REVIEW OF SYSTEMS:   Sedated intubated paralyzed    Ventilator/Non-Invasive Ventilation Settings (From admission, onward)     Start     Ordered    10/01/21 0729  Ventilator - AC/VC; (29); 50; 90%; 12; 440  Continuous     Comments: Changes made by MD   Question Answer Comment   Vent Mode AC/VC    Breath rate  29   FiO2 50    FiO2 titrate for Sp02% =/> 90%    PEEP 12    Tidal Volume 440        10/01/21 0728    09/29/21 1313  Ventilator - AC/VC; (29); 50; 90%; 12; 460  Continuous,   Status:  Canceled     Comments: Changes made by MD   Question Answer Comment   Vent Mode AC/VC    Breath rate  29   FiO2 50    FiO2 titrate for Sp02% =/> 90%    PEEP 12    Tidal Volume 460        09/29/21 1312    09/28/21 1749  Ventilator - AC/VC; (30); 80; 90%; 12; 440  Continuous,   Status:  Canceled     Comments: Changes made by MD   Question Answer Comment   Vent Mode AC/VC    Breath rate  30   FiO2 80    FiO2 titrate for Sp02% =/> 90%    PEEP 12    Tidal Volume 440        09/28/21 1750    09/28/21 0639  Ventilator - AC/PC; (28); 70; 90%; Other (see comments); 14; 15  Continuous,   Status:  Canceled     Question Answer Comment   Vent Mode AC/PC    Breath rate  28   FiO2 70    FiO2 titrate for Sp02% =/> 90%    PEEP Other (see comments)    PEEP: 14    Inspiratory Pressure 15        09/28/21 0639    09/21/21 1347  Ventilator - AC/PC; (24); 60; 90%; 12; 15  Continuous,   Status:  Canceled     Question Answer Comment   Vent Mode AC/PC    Breath rate  24   FiO2 60    FiO2 titrate for Sp02%  =/> 90%    PEEP 12    Inspiratory Pressure 15        09/21/21 1346    09/18/21 1233  Ventilator - AC/PC; (14); 60; 90%; 10; 15  Continuous,   Status:  Canceled     Comments: Increase peep 10   Question Answer Comment   Vent Mode AC/PC    Breath rate  14   FiO2 60    FiO2 titrate for Sp02% =/> 90%    PEEP 10    Inspiratory Pressure 15        09/18/21 1233    09/18/21 0924  Ventilator - AC/PC; (14); 60; 90%; 8; 15  Continuous,   Status:  Canceled     Comments: Increase to 60% and decrease rate to 14, abg at 1200   Question Answer Comment   Vent Mode AC/PC    Breath rate  14   FiO2 60    FiO2 titrate for Sp02% =/> 90%    PEEP 8    Inspiratory Pressure 15        09/18/21 0923    09/18/21 0922  Ventilator - AC/PC; (4); 60; 90%; 8; 15  Continuous,   Status:  Canceled     Comments: Increase to 60% and decrease rate to 14, abg at 1200   Question Answer Comment   Vent Mode AC/PC    Breath rate  4   FiO2 60    FiO2 titrate for Sp02% =/> 90%    PEEP 8    Inspiratory Pressure 15        09/18/21 0922    09/16/21 1222  Ventilator - AC/PC; (18); 40; 8  Continuous,   Status:  Canceled     Question Answer Comment   Vent Mode AC/PC    Breath rate  18   FiO2 40    PEEP 8        09/16/21 1222    09/15/21 1652  Ventilator - AC/VC+; (28); 40; 8; 500  Continuous,   Status:  Canceled     Question Answer Comment   Vent Mode AC/VC+    Breath rate  28   FiO2 40    PEEP 8    Tidal Volume 500        09/15/21 1652    09/15/21 0834  Ventilator - AC/VC; (28); 90; 8; 500  Continuous,   Status:  Canceled     Question Answer Comment   Vent Mode AC/VC    Breath rate  28   FiO2 90    PEEP 8    Tidal Volume 500        09/15/21 0834                  Vital Signs  Temp:  [97.7 °F (36.5 °C)-98.3 °F (36.8 °C)] 98.1 °F (36.7 °C)  Heart Rate:  [] 107  Resp:  [32] 32  BP: ()/(49-84) 105/64  FiO2 (%):  [60 %-70 %] 60 %    Intake/Output Summary (Last 24 hours) at 10/6/2021 1114  Last data filed at 10/6/2021 0633  Gross per 24 hour   Intake 2179.62 ml  "  Output 975 ml   Net 1204.62 ml     Flowsheet Rows      First Filed Value   Admission Height  175.3 cm (69\") Documented at 09/14/2021 1053   Admission Weight  95.3 kg (210 lb) Documented at 09/14/2021 1053          Physical Exam:  Patient is examined using the personal protective equipment as per guidelines from infection control for this particular patient as enacted.  Hand hygiene was performed before and after patient interaction.   General Appearance:   Sedated intubated paralyzed  Neck midline trachea, no thyromegaly   Lungs:    Diminished breath sounds equal on the vent    Heart:    Regular rhythm and normal rate, normal S1 and S2, no            murmur, no gallop, no rub, no click   Chest Wall:    No abnormalities observed   Abdomen:     Normal bowel sounds, no masses, no organomegaly, soft        nontender, nondistended, no guarding, no rebound                tenderness   Extremities:  Trace to 1+ edema, no cyanosis, no             redness  CNS sedated intubated paralyzed  Skin no rashes no nodules  Musculoskeletal no cyanosis no clubbing      Results Review:        Results from last 7 days   Lab Units 10/05/21  2233 10/05/21  0207 10/05/21  0207 10/04/21  0140 10/04/21  0140   SODIUM mmol/L 127*  --  127*  --  127*   POTASSIUM mmol/L 4.7  --  4.0  --  4.3   CHLORIDE mmol/L 86*  --  87*  --  89*   CO2 mmol/L 23.7  --  23.2  --  20.6*   BUN mg/dL 63*  --  48*  --  76*   CREATININE mg/dL 2.89*  --  2.77*  --  3.92*   GLUCOSE mg/dL 245*   < > 167*   < > 203*   CALCIUM mg/dL 8.5*  --  8.0*  --  8.0*    < > = values in this interval not displayed.     Results from last 7 days   Lab Units 10/02/21  0749 10/01/21  0825   TROPONIN T ng/mL 0.056* 0.045*     Results from last 7 days   Lab Units 10/05/21  2233 10/05/21  0207 10/04/21  0140   WBC 10*3/mm3 9.11 8.83 7.64   HEMOGLOBIN g/dL 8.5* 8.3* 8.9*   HEMATOCRIT % 25.8* 25.6* 27.3*   PLATELETS 10*3/mm3 156 130* 125*     Results from last 7 days   Lab Units " 10/05/21  2233 10/05/21  1650 10/05/21  0754   APTT seconds 60.6* 64.2* 49.0*                 Results from last 7 days   Lab Units 10/06/21  0432   PH, ARTERIAL pH units 7.305*   PO2 ART mm Hg 97.6   PCO2, ARTERIAL mm Hg 53.0*   HCO3 ART mmol/L 26.4*       I reviewed the patient's new clinical results.  I personally viewed and interpreted the patient's chest x-ray.        Medication Review:   artificial tears, , Both Eyes, Q4H  aspirin, 81 mg, Oral, Daily  atorvastatin, 20 mg, Oral, Nightly  castor oil-balsam peru, 1 application, Topical, Q12H  cefTRIAXone, 2 g, Intravenous, Q24H  chlorhexidine, 15 mL, Mouth/Throat, Q12H  clindamycin, 600 mg, Intravenous, Q8H  dexamethasone, 6 mg, Intravenous, Q24H  insulin aspart, 0-14 Units, Subcutaneous, Q6H  insulin detemir, 7 Units, Subcutaneous, Q12H  nystatin, , Topical, Q12H  pantoprazole, 40 mg, Intravenous, Q12H  polyethylene glycol, 17 g, Oral, Daily  senna-docusate sodium, 1 tablet, Oral, BID  sodium bicarbonate, 100 mEq, Intravenous, Once  sodium bicarbonate, 1,300 mg, Oral, TID  sodium chloride, 1,000 mL, Intravenous, Once in Dialysis  sodium chloride, 10 mL, Intravenous, Q12H  sodium chloride, 10 mL, Intravenous, Q12H  sodium chloride, 10 mL, Intravenous, Q12H  sodium chloride, 10 mL, Intravenous, Q12H  voriconazole, 200 mg, Per G Tube, Q12H        dexmedetomidine, 0.2-1.5 mcg/kg/hr, Last Rate: Stopped (10/06/21 0108)  fentaNYL Citrate,   heparin (porcine), 9.8 Units/kg/hr, Last Rate: 15.686 Units/kg/hr (10/06/21 0931)  Midazolam 1 mg/mL 100mL NS, 1-10 mg/hr, Last Rate: 4 mg/hr (10/06/21 0613)  norepinephrine, 0.02-0.3 mcg/kg/min, Last Rate: 0.12 mcg/kg/min (10/06/21 0934)  propofol, 5-50 mcg/kg/min, Last Rate: 50 mcg/kg/min (10/06/21 0931)  vecuronium (NORCURON) infusion, 0.6-1.2 mcg/kg/min, Last Rate: 1.2 mcg/kg/min (10/05/21 2050)        ASSESSMENT:   1. Acute hypoxic and hypercapnic respiratory failure, on MV 9/14  2. COVID-19 pneumonia  3. Severe ARDS, PF ratio  97  4. RSV URTI  5. Hypotension, secondary sedation  6. Bacterial pneumonia (elevated procalcitonin) posterior consolidations  7. Severe Metabolic acidosis  8. Acute diabetic ketoacidosis, resolved  9. Acute renal failure  10. Acute pancreatitis, improved  11. Metabolic acidosis, secondary to renal failure  12. Poorly controlled DM - Hba1c - 11%  13. Candida albicans in sputum, probable contamination/colonization  14. Proteinuria  15. Propofol induced hypertriglyceridemia, mild  16. Anemia  17. Elevated d dimer -elevated - can certainly be seen in covid ards, neg studies so far, on heparin  18. Cavitary lung lesion     PLAN:  Respiratory status still more stable with heavy sedation and paralytics.  Continue with heavy sedated and paralyzed.   Wean down FiO2 to keep sats above 90%.  FiO2 requirements continues to improve.  Once FiO2 at 60% requirement will start weaning down PEEP.  PO2 improved on ABG this morning  Once FiO2 60% or less and PEEP less than 10 we will try weaning off Nimbex drip first.  Cavitary lung lesion will need outpatient follow-up with repeat CT chest.  Antibiotics recommended for 3 weeks  Continue Decadron 6 mg daily and start weaning after day 10.  Discussed with nursing staff.  Patient critically ill  Will likely need trach and PEG down the road once FiO2 requirement less than 40%.                Indra Leblanc MD  10/06/21  11:14 EDT

## 2021-10-06 NOTE — PROGRESS NOTES
PROGRESS NOTE         Patient Identification:  Name:  Martinez Mckenna  Age:  51 y.o.  Sex:  male  :  1970  MRN:  [id number]  Visit Number:  [id number]  Primary Care Provider:  Rafal Casillas MD         LOS: 21 days       ----------------------------------------------------------------------------------------------------------------------  Subjective       Chief Complaints:    Respiratory Distress        Interval History:      Patient remains sedated and intubated at an improved oxygen requirement of 80%.  Afebrile.  CRP worsened at 4.74.  White count normal at 8.83.  AFB culture from 10/4/2021 still processing.  Chest x-ray from 10/5/2021 reports stable bilateral airspace disease.      Review of Systems:    Unable to obtain.  Intubated and sedated.  ----------------------------------------------------------------------------------------------------------------------      Objective       Current [facility] Meds:  artificial tears, , Both Eyes, Q4H  aspirin, 81 mg, Oral, Daily  atorvastatin, 20 mg, Oral, Nightly  castor oil-balsam peru, 1 application, Topical, Q12H  cefTRIAXone, 2 g, Intravenous, Q24H  chlorhexidine, 15 mL, Mouth/Throat, Q12H  clindamycin, 600 mg, Intravenous, Q8H  dexamethasone, 6 mg, Intravenous, Q24H  insulin aspart, 0-14 Units, Subcutaneous, Q6H  insulin detemir, 7 Units, Subcutaneous, Q12H  nystatin, , Topical, Q12H  pantoprazole, 40 mg, Intravenous, Q12H  polyethylene glycol, 17 g, Oral, Daily  senna-docusate sodium, 1 tablet, Oral, BID  sodium bicarbonate, 100 mEq, Intravenous, Once  sodium bicarbonate, 1,300 mg, Oral, TID  sodium chloride, 1,000 mL, Intravenous, Once in Dialysis  sodium chloride, 10 mL, Intravenous, Q12H  sodium chloride, 10 mL, Intravenous, Q12H  sodium chloride, 10 mL, Intravenous, Q12H  sodium chloride, 10 mL, Intravenous, Q12H  voriconazole, 200 mg, Per G Tube, Q12H      dexmedetomidine, 0.2-1.5 mcg/kg/hr, Last Rate: Stopped (10/06/21 0108)  fentaNYL  Citrate,   heparin (porcine), 9.8 Units/kg/hr, Last Rate: 15.686 Units/kg/hr (10/06/21 0931)  Midazolam 1 mg/mL 100mL NS, 1-10 mg/hr, Last Rate: 4 mg/hr (10/06/21 0613)  norepinephrine, 0.02-0.3 mcg/kg/min, Last Rate: 0.11 mcg/kg/min (10/06/21 1118)  propofol, 5-50 mcg/kg/min, Last Rate: 50 mcg/kg/min (10/06/21 0931)  vecuronium (NORCURON) infusion, 0.6-1.2 mcg/kg/min, Last Rate: 1.2 mcg/kg/min (10/05/21 2050)      ----------------------------------------------------------------------------------------------------------------------    Vital Signs:  Temp:  [97.7 °F (36.5 °C)-98.3 °F (36.8 °C)] 98.1 °F (36.7 °C)  Heart Rate:  [] 105  Resp:  [32] 32  BP: ()/(45-85) 87/57  FiO2 (%):  [60 %-70 %] 60 %  Mean Arterial Pressure (Non-Invasive) for the past 24 hrs (Last 3 readings):   Noninvasive MAP (mmHg)   10/06/21 1102 69   10/06/21 1047 83   10/06/21 1032 82     SpO2 Percentage    10/06/21 1032 10/06/21 1047 10/06/21 1102   SpO2: 94% 93% 92%     SpO2:  [87 %-100 %] 92 %  on   ;   Device (Oxygen Therapy): ventilator    Body mass index is 33.76 kg/m².  Wt Readings from Last 3 Encounters:   10/06/21 104 kg (228 lb 11.2 oz)   08/04/21 95.3 kg (210 lb)        Intake/Output Summary (Last 24 hours) at 10/6/2021 1130  Last data filed at 10/6/2021 0643  Gross per 24 hour   Intake 2179.62 ml   Output 975 ml   Net 1204.62 ml     NPO Diet  ----------------------------------------------------------------------------------------------------------------------      Physical Exam:    Deferred due to COVID-19 isolation.  ----------------------------------------------------------------------------------------------------------------------  Results from last 7 days   Lab Units 10/02/21  0749 10/01/21  0825   TROPONIN T ng/mL 0.056* 0.045*             Results from last 7 days   Lab Units 10/06/21  0432   PH, ARTERIAL pH units 7.305*   PO2 ART mm Hg 97.6   PCO2, ARTERIAL mm Hg 53.0*   HCO3 ART mmol/L 26.4*     Results from last 7  days   Lab Units 10/05/21  2233 10/05/21  0207 10/04/21  0140   CRP mg/dL 7.74* 4.74* 2.78*   WBC 10*3/mm3 9.11 8.83 7.64   HEMOGLOBIN g/dL 8.5* 8.3* 8.9*   HEMATOCRIT % 25.8* 25.6* 27.3*   MCV fL 93.5 92.4 91.9   MCHC g/dL 32.9 32.4 32.6   PLATELETS 10*3/mm3 156 130* 125*     Results from last 7 days   Lab Units 10/05/21  2233 10/05/21  0207 10/04/21  0140   SODIUM mmol/L 127* 127* 127*   POTASSIUM mmol/L 4.7 4.0 4.3   CHLORIDE mmol/L 86* 87* 89*   CO2 mmol/L 23.7 23.2 20.6*   BUN mg/dL 63* 48* 76*   CREATININE mg/dL 2.89* 2.77* 3.92*   EGFR IF NONAFRICN AM mL/min/1.73 23* 25* 16*   CALCIUM mg/dL 8.5* 8.0* 8.0*   GLUCOSE mg/dL 245* 167* 203*   ALBUMIN g/dL 2.88* 2.76* 2.49*   BILIRUBIN mg/dL 0.3 0.4 0.3   ALK PHOS U/L 84 80 85   AST (SGOT) U/L 9 10 8   ALT (SGPT) U/L 6 6 6   Estimated Creatinine Clearance: 36.7 mL/min (A) (by C-G formula based on SCr of 2.89 mg/dL (H)).  No results found for: AMMONIA    Glucose   Date/Time Value Ref Range Status   10/06/2021 0635 232 (H) 70 - 130 mg/dL Final     Comment:     Meter: [id number] : [id number] Jaqui Santamaria   10/05/2021 2358 277 (H) 70 - 130 mg/dL Final     Comment:     Meter: [id number] : [id number] Jaqui Mackch   10/05/2021 1654 307 (H) 70 - 130 mg/dL Final     Comment:     Meter: [id number] : [id number] Savannah serna   10/05/2021 1240 328 (H) 70 - 130 mg/dL Final     Comment:     Meter: [id number] : [id number] Hernando Adame MATT   10/05/2021 0513 221 (H) 70 - 130 mg/dL Final     Comment:     Meter: [id number] : [id number] VALENTINE NEREIDAPOLARA   10/04/2021 2325 148 (H) 70 - 130 mg/dL Final     Comment:     Meter: [id number] : [id number] VALENTINE NEREIDAPOCK   10/04/2021 1746 229 (H) 70 - 130 mg/dL Final     Comment:     Meter: [id number] : [id number] Savannah serna   10/04/2021 1122 224 (H) 70 - 130 mg/dL Final     Comment:     Meter: [id number] : [id number] Betsy Kurtz     Lab Results   Component Value Date    HGBA1C 11.20 (H) 09/14/2021      Lab Results   Component Value Date    TSH 0.761 09/14/2021    FREET4 1.08 09/14/2021       Blood Culture   Date Value Ref Range Status   09/14/2021 No growth at 2 days  Preliminary   09/14/2021 No growth at 2 days  Preliminary     No results found for: URINECX  No results found for: WOUNDCX  No results found for: STOOLCX  No results found for: RESPCX  Pain Management Panel    There is no flowsheet data to display.           ----------------------------------------------------------------------------------------------------------------------  Imaging Results (Last 24 Hours)       Procedure Component Value Units Date/Time    XR Chest 1 View [[id number]] Resulted: 10/06/21 0815     Updated: 10/06/21 1032            ----------------------------------------------------------------------------------------------------------------------    Assessment/Plan       Assessment/Plan     ASSESSMENT:    1.  Septic shock with lactic acid greater than 4 on admission  2.  Covid pneumonia  3.  Aspiration pneumonia    PLAN:    Patient remains sedated and intubated at an improved oxygen requirement of 80%.  Afebrile.  CRP worsened at 4.74.  White count normal at 8.83.  AFB culture from 10/4/2021 still processing.  Chest x-ray from 10/5/2021 reports stable bilateral airspace disease.    Procalcitonin from 10/1/2021 improved at 1.40.  Fungal antibody panel negative.  QuantiFERON-TB gold is indeterminate.  Aspergillus galactomannan antigen positive at 1.88.      Respiratory culture from 9/28/2021 has finalized with light growth of yeast, not Candida albicans.      CT chest with PE protocol on 9/26/2021 shows markedly limited examination with no gross pulmonary emboli.  Pneumomediastinum.  Correlate with ventilation settings.  Significant bilateral groundglass infiltrates with lower lobe consolidation and new cavitary process with filling defect in the right upper lobe.  Correlate for superimposed opportunistic infection.  Marked  abnormalities in the upper abdomen suspicious for pancreatitis.  Incidental fluid-filled colon loops that may be correlated clinically with diarrhea.  Moderate steatosis of the liver, correlate with LFTs.    Legionella screen negative.  Strep pneumo screen negative. Respiratory culture from 9/15/2021 finalized as light growth of Candida albicans. Respiratory panel from 9/15/2021 detected RSV.  Blood cultures from 9/14/2021 finalized as no growth.  MRSA screen was negative on 9/15/2021.    In the setting of cavitary lesion and indeterminant QuantiFERON TB Gold there is strong indication for bronchoscopy with BAL for AFB.  Continue to recommend bronchoscopy when feasible.    Pulmonology note reviewed, in regard to cavitary lesion, recommendation to follow-up with outpatient imaging in 6 to 8 weeks and suggested antibiotic course x3 weeks.     Recommend to continue voriconazole course.  Rocephin and clindamycin courses recommend to continue through October 14, 2021.  In keeping with pulmonary recommendations, would recommend repeat CT chest in the coming week.    We will continue to follow closely and adjust coverage as needed.    Code Status:   Code Status and Medical Interventions:   Ordered at: 09/28/21 1631     Limited Support to NOT Include:    Cardioversion/Defibrillation     Level Of Support Discussed With:    Next of Kin (If No Surrogate)     Code Status:    No CPR     Medical Interventions (Level of Support Prior to Arrest):    Limited     Scribed for Chloe Garvey MD by ALYSE Thompson. 10/6/2021  11:30 EDT       ALYSE Thompson  10/06/21  11:30 EDT    Physician Attestation:    The documentation recorded by the scribe accurately reflects the service I personally performed and the decisions made by me.    Chloe Garvey MD  10/06/21  11:30 EDT

## 2021-10-07 VITALS
OXYGEN SATURATION: 61 % | DIASTOLIC BLOOD PRESSURE: 32 MMHG | SYSTOLIC BLOOD PRESSURE: 46 MMHG | BODY MASS INDEX: 33.87 KG/M2 | WEIGHT: 228.7 LBS | RESPIRATION RATE: 32 BRPM | TEMPERATURE: 97.6 F | HEIGHT: 69 IN

## 2021-10-12 NOTE — PAYOR COMM NOTE
"[facility]  NPI: [id number]    Utilization Review   Contact:Kirsten Pan MSN, APRN, FNP- BC  Phone: [phone]  Fax: [phone]    bcbs/ attn: nurse review  Discharge Notification  REF# [id number]  D/C date 10-6-21     Please send back auth number and days that are covered or if drg approval  Admit 9-15 21          Martinez Mckenna (Dcsd. Male)             Date of Birth Social Security Number Address Home Phone MRN    1970  [id number] [id number] [phone] [id number]    Jainism Marital Status             None Single       Admission Date Admission Type Admitting Provider Attending Provider Department, Room/Bed    21 Emergency Adama Brown MD  [facility], CC05/1C    Discharge Date Discharge Disposition Discharge Destination          10/6/2021               Attending Provider: (none)   Allergies: No Known Allergies    Isolation: None   Infection: COVID (confirmed) (09/15/21)   Code Status: Prior   Advance Care Planning Activity    Ht: 175.3 cm (69.02\")   Wt: 104 kg (228 lb 11.2 oz)    Admission Cmt: None   Principal Problem: None                Active Insurance as of 2021     Primary Coverage     Payor Plan Insurance Group Employer/Plan Group    [employer] [facility] [employer] [facility] PPO [id number]     Payor Plan Address Payor Plan Phone Number Payor Plan Fax Number Effective Dates    [address] [phone]  2020 - None Entered    [place] [address]       Subscriber Name Subscriber Birth Date Member ID       MARTINEZ MCKENNA 1970 [id number]                 Emergency Contacts      (Rel.) Home Phone Work Phone Mobile Phone    SAMMY ARAGON (Partner) [phone] -- --    Darrin Cari (Sister) [phone] -- --    Román Mckenna (Father) [phone] -- --            Discharge note not available yet    Keegan Monte MD   Physician   Medicine   Progress Notes       Signed "   Date of Service:  10/06/21 1107   Creation Time:  10/06/21 1107              Signed        Expand All Collapse All          Show:Clear all  [x]Manual[x]Template[]Copied    Added by:  [x]Keegan Monte MD      []Paris for details         [facility]     Patient Identification:  Name:  Martinez Mckenna  Age:  51 y.o.  Sex:  male  :  1970  MRN:  [id number]  Visit Number:  [id number]  ROOM: [address]      Primary Care Provider:  Rafal Casillas MD     Length of stay in inpatient status:  21     Subjective      Chief Compliant:        Chief Complaint   Patient presents with   • Respiratory Distress         History of Presenting Illness:    Patient remains intubated, sedated. Oxygenation improving. Had 700 cc of urine output yesterday. Patient was receiving dialysis this morning with goal of 2.5L ultrafiltrate, pressors was started after dialysis initiation, up to 0.12 of levophed.      ROS:  Otherwise 10 point ROS negative other than documented above in HPI.      Objective      Current Hospital Meds:artificial tears, , Both Eyes, Q4H  aspirin, 81 mg, Oral, Daily  atorvastatin, 20 mg, Oral, Nightly  castor oil-balsam peru, 1 application, Topical, Q12H  cefTRIAXone, 2 g, Intravenous, Q24H  chlorhexidine, 15 mL, Mouth/Throat, Q12H  clindamycin, 600 mg, Intravenous, Q8H  dexamethasone, 6 mg, Intravenous, Q24H  insulin aspart, 0-14 Units, Subcutaneous, Q6H  insulin detemir, 7 Units, Subcutaneous, Q12H  nystatin, , Topical, Q12H  pantoprazole, 40 mg, Intravenous, Q12H  polyethylene glycol, 17 g, Oral, Daily  senna-docusate sodium, 1 tablet, Oral, BID  sodium bicarbonate, 100 mEq, Intravenous, Once  sodium bicarbonate, 1,300 mg, Oral, TID  sodium chloride, 1,000 mL, Intravenous, Once in Dialysis  sodium chloride, 10 mL, Intravenous, Q12H  sodium chloride, 10 mL, Intravenous, Q12H  sodium chloride, 10 mL, Intravenous, Q12H  sodium chloride, 10 mL, Intravenous, Q12H  voriconazole, 200 mg,  Per G Tube, Q12H     dexmedetomidine, 0.2-1.5 mcg/kg/hr, Last Rate: Stopped (10/06/21 0108)  fentaNYL Citrate,   heparin (porcine), 9.8 Units/kg/hr, Last Rate: 15.686 Units/kg/hr (10/06/21 0931)  Midazolam 1 mg/mL 100mL NS, 1-10 mg/hr, Last Rate: 4 mg/hr (10/06/21 0613)  norepinephrine, 0.02-0.3 mcg/kg/min, Last Rate: 0.12 mcg/kg/min (10/06/21 0934)  propofol, 5-50 mcg/kg/min, Last Rate: 50 mcg/kg/min (10/06/21 0931)  vecuronium (NORCURON) infusion, 0.6-1.2 mcg/kg/min, Last Rate: 1.2 mcg/kg/min (10/05/21 2050)           Current Antimicrobial Therapy:              Anti-Infectives (From admission, onward)     Ordered     Dose/Rate Route Frequency Start Stop     10/04/21 0910   cefTRIAXone (ROCEPHIN) 2 g in sodium chloride 0.9 % 100 mL IVPB-VTB     Ordering Provider: Chloe Garvey MD    2 g  200 mL/hr over 30 Minutes Intravenous Every 24 Hours 10/04/21 1200 10/14/21 1159     10/04/21 0910   clindamycin (CLEOCIN) 600 mg in dextrose 5% 50 mL IVPB (premix)     Ordering Provider: Chloe Garvey MD    600 mg  50 mL/hr over 60 Minutes Intravenous Every 8 Hours 10/04/21 1100 10/14/21 1059     09/30/21 1426   voriconazole (VFEND) tablet 200 mg     Mariana Macias APRN reviewed the order on 10/02/21 1325.   Ordering Provider: Chloe Garvey MD    200 mg Per G Tube Every 12 Hours 10/01/21 1800 10/21/21 1759     10/01/21 1709   vancomycin 1 g/250 mL 0.9% NS (vial-mate)     Ordering Provider: Chloe Garvey MD    1,000 mg  over 60 Minutes Intravenous Once 10/01/21 1800 10/01/21 1850     09/30/21 1426   voriconazole (VFEND) 500 mg in dextrose (D5W) 5 % 100 mL IVPB     Ordering Provider: Chloe Garvey MD    6 mg/kg × 83.2 kg (Adjusted)  over 60 Minutes Intravenous Every 12 Hours 09/30/21 1600 10/01/21 0520     09/29/21 1501   vancomycin 1 g/250 mL 0.9% NS (vial-mate)     Ordering Provider: Kieran Rene MD    1,000 mg  over 60 Minutes Intravenous Once 09/29/21 1600 09/29/21 1859     09/27/21 1300    vancomycin 1 g/250 mL 0.9% NS (vial-mate)     Ordering Provider: Kieran eRne MD    1,000 mg  over 60 Minutes Intravenous Once 09/27/21 1500 09/27/21 1813     09/26/21 1426   Vancomycin Pharmacy Intermittent Dosing     Ordering Provider: Jaqui Tapia PA      Does not apply Daily 09/26/21 1515 10/01/21 0859     09/26/21 1408   meropenem (MERREM) 500 mg in sodium chloride 0.9 % 100 mL IVPB-VTB     Ordering Provider: Jaqui Tapia PA    500 mg  over 3 Hours Intravenous Every 24 Hours 09/26/21 1500 09/30/21 1739     09/23/21 1302   vancomycin 1 g/250 mL 0.9% NS (vial-mate)     Ordering Provider: Chloe Garvey MD    1,000 mg  over 60 Minutes Intravenous Once 09/23/21 1400 09/23/21 1551     09/21/21 1259   meropenem (MERREM) 500 mg in sodium chloride 0.9 % 100 mL IVPB-VTB     Ordering Provider: Chloe Garvey MD    500 mg  over 3 Hours Intravenous Every 24 Hours 09/22/21 1400 09/26/21 1707     09/21/21 1257   vancomycin 1 g/250 mL 0.9% NS (vial-mate)     Ordering Provider: Chloe Garvey MD    1,000 mg  over 60 Minutes Intravenous Once 09/21/21 1400 09/21/21 1735     09/19/21 0947   Vancomycin Pharmacy Intermittent Dosing     Ordering Provider: Chloe Garvey MD      Does not apply Daily 09/20/21 0900 09/26/21 0859     09/19/21 0947   vancomycin 1750 mg/500 mL 0.9% NS IVPB ([facility])     Ordering Provider: Chloe Garvey MD    20 mg/kg × 90.1 kg  over 120 Minutes Intravenous Once 09/19/21 1200 09/19/21 1454     09/19/21 0947   meropenem (MERREM) 500 mg in sodium chloride 0.9 % 100 mL IVPB-VTB     Ordering Provider: Chloe Garvey MD    500 mg  over 30 Minutes Intravenous Once 09/19/21 1200 09/19/21 1325     09/15/21 0443   remdesivir 100 mg in 270 mL NS     Ordering Provider: Adama Brown MD    100 mg  over 60 Minutes Intravenous Every 24 Hours 09/16/21 0600 09/19/21 1013     09/15/21 0443   remdesivir 200 mg in 290 mL NS     Ordering Provider: Adama Brown,  MD    200 mg  over 60 Minutes Intravenous Every 24 Hours 09/15/21 0600 09/15/21 0742     09/14/21 1912   vancomycin 2000 mg/500 mL 0.9% NS IVPB ([facility])     Ordering Provider: Gilmer Avitia MD    20 mg/kg × 95.3 kg  over 120 Minutes Intravenous Once 09/14/21 2000 09/14/21 2240     09/14/21 1950   piperacillin-tazobactam (ZOSYN) IVPB 4.5 g in 100 mL NS VTB     Ordering Provider: Gilmer Avitia MD    4.5 g  over 30 Minutes Intravenous Once 09/14/21 1952 09/14/21 2054 09/14/21 1101   piperacillin-tazobactam (ZOSYN) IVPB 3.375 g in 100 mL NS VTB     Ordering Provider: James Interiano MD    3.375 g  over 30 Minutes Intravenous Once 09/14/21 1103 09/14/21 1202          Current Diuretic Therapy:              Diuretics (From admission, onward)     Ordered     Dose/Rate Route Frequency Start Stop     09/17/21 1314   furosemide (LASIX) injection 80 mg     Ordering Provider: Sb Sullivan MD    80 mg Intravenous Once 09/17/21 1400 09/17/21 1343          ----------------------------------------------------------------------------------------------------------------------  Vital Signs:  Temp:  [97.7 °F (36.5 °C)-98.3 °F (36.8 °C)] 98.1 °F (36.7 °C)  Heart Rate:  [] 107  Resp:  [32] 32  BP: ()/(49-84) 105/64  FiO2 (%):  [60 %-70 %] 60 %  SpO2:  [87 %-100 %] 95 %  on   ;   Device (Oxygen Therapy): ventilator  Body mass index is 33.76 kg/m².         Wt Readings from Last 3 Encounters:   10/06/21 104 kg (228 lb 11.2 oz)   08/04/21 95.3 kg (210 lb)               Intake & Output (last 3 days)        10/03 0701 - 10/04 0700 10/04 0701 - 10/05 0700 10/05 0701 - 10/06 0700 10/06 0701 - 10/07 0700     I.V. (mL/kg) 2448 (23.8) 2020.3 (19.4) 1539.6 (14.8)       Other 60 0 0       NG/ 1045 390       IV Piggyback 100 400 300       Total Intake(mL/kg) 3284 (31.9) 3465.3 (33.3) 2229.6 (21.4)       Urine (mL/kg/hr) 750 (0.3) 700 (0.3) 975 (0.4)       Stool 0 0 0       Total Output 750 700 975        Net +2534 +2765.3 +1254.6                     Stool Unmeasured Occurrence 1 x 1 x              NPO Diet  ----------------------------------------------------------------------------------------------------------------------  Physical exam:  GENERAL:  Patient intubated and sedated.   SKIN:  Warm, dry without rashes, purpura or petechiae.  EYES:  Pupils equal, round and reactive to light.  EOMs intact.  Conjunctivae normal.  HEAD:  Normocephalic. Atraumatic.   EARS/NOSE/MOUTH/THROAT:  Hearing intact. Septum midline.  No excoriations or nasal discharge. No stomatitis or ulcers.  Lips normal. Oropharynx without lesions or exudates.  NECK:  Supple with good range of motion; no thyromegaly or masses  LYMPHATICS:  No cervical, supraclavicular, axillary adenopathy.  RESP:  Lungs clear to auscultation. Good airflow. Intubated receiving mechanical ventilation.   CARDIAC:  Regular rate and rhythm without murmurs, rubs or gallops. Normal S1,S2. No edema  GI:  Soft, nontender, no hepatosplenomegaly, normal bowel sounds  MSK:  No clubbing or cyanosis, No joint swelling noted in hands  NEUROLOGICAL:  No focal deficit. Pupils equal and reactive.   ----------------------------------------------------------------------------------------------------------------------  Tele:    ----------------------------------------------------------------------------------------------------------------------         Results from last 7 days   Lab Units 10/05/21  2233 10/05/21  0207 10/04/21  0140   CRP mg/dL 7.74* 4.74* 2.78*   WBC 10*3/mm3 9.11 8.83 7.64   HEMOGLOBIN g/dL 8.5* 8.3* 8.9*   HEMATOCRIT % 25.8* 25.6* 27.3*   MCV fL 93.5 92.4 91.9   MCHC g/dL 32.9 32.4 32.6   PLATELETS 10*3/mm3 156 130* 125*           Results from last 7 days   Lab Units 10/06/21  0432   PH, ARTERIAL pH units 7.305*   PO2 ART mm Hg 97.6   PCO2, ARTERIAL mm Hg 53.0*   HCO3 ART mmol/L 26.4*             Results from last 7 days   Lab Units 10/05/21  2233 10/05/21  0207  10/04/21  0140   SODIUM mmol/L 127* 127* 127*   POTASSIUM mmol/L 4.7 4.0 4.3   CHLORIDE mmol/L 86* 87* 89*   CO2 mmol/L 23.7 23.2 20.6*   BUN mg/dL 63* 48* 76*   CREATININE mg/dL 2.89* 2.77* 3.92*   EGFR IF NONAFRICN AM mL/min/1.73 23* 25* 16*   CALCIUM mg/dL 8.5* 8.0* 8.0*   GLUCOSE mg/dL 245* 167* 203*   ALBUMIN g/dL 2.88* 2.76* 2.49*   BILIRUBIN mg/dL 0.3 0.4 0.3   ALK PHOS U/L 84 80 85   AST (SGOT) U/L 9 10 8   ALT (SGPT) U/L 6 6 6   Estimated Creatinine Clearance: 36.7 mL/min (A) (by C-G formula based on SCr of 2.89 mg/dL (H)).  No results found for: AMMONIA        Results from last 7 days   Lab Units 10/02/21  0749 10/01/21  0825   TROPONIN T ng/mL 0.056* 0.045*                      Glucose   Date/Time Value Ref Range Status   10/06/2021 0635 232 (H) 70 - 130 mg/dL Final       Comment:       Meter: [id number] : [id number] Jaqui Santamaria   10/05/2021 2358 277 (H) 70 - 130 mg/dL Final       Comment:       Meter: [id number] : [id number] Jaqui Santamaria   10/05/2021 1654 307 (H) 70 - 130 mg/dL Final       Comment:       Meter: [id number] : [id number] Savannah serna   10/05/2021 1240 328 (H) 70 - 130 mg/dL Final       Comment:       Meter: [id number] : [id number] Hernando GUTIERREZ   10/05/2021 0513 221 (H) 70 - 130 mg/dL Final       Comment:       Meter: [id number] : [id number] VALENTINE COPPOCK   10/04/2021 2325 148 (H) 70 - 130 mg/dL Final       Comment:       Meter: [id number] : [id number] VALENTINE COPPOCK   10/04/2021 1746 229 (H) 70 - 130 mg/dL Final       Comment:       Meter: [id number] : [id number] Nuñez kareem   10/04/2021 1122 224 (H) 70 - 130 mg/dL Final       Comment:       Meter: [id number] : [id number] Betsy Kurtz            Lab Results   Component Value Date     TSH 0.761 09/14/2021     FREET4 1.08 09/14/2021      No results found for: PREGTESTUR, PREGSERUM, HCG, HCGQUANT  Pain Management Panel       There is no flowsheet data to display.                                 "  Brief Urine Lab Results  (Last result in the past 365 days)       Color   Clarity   Blood   Leuk Est   Nitrite   Protein   CREAT   Urine HCG         09/14/21 1108 Yellow Clear Small (1+) Negative Negative 100 mg/dL (2+)                    No results found for: BLOODCX  No results found for: URINECX  No results found for: WOUNDCX  No results found for: STOOLCX  No results found for: RESPCX  No results found for: AFBCX             Results from last 7 days   Lab Units 10/05/21  2233 10/05/21  0207 10/04/21  0140 10/03/21  0349 10/02/21  0100 10/01/21  0432 09/30/21  0539   PROCALCITONIN ng/mL  --  0.85*  --  0.93*  --  1.40*  --    CRP mg/dL 7.74* 4.74* 2.78* 4.65* 10.92* 10.11* 10.58*         I have personally looked at the labs and they are summarized above.  ----------------------------------------------------------------------------------------------------------------------  Detailed radiology reports for the last 24 hours:              Imaging Results (Last 24 Hours)      Procedure Component Value Units Date/Time     XR Chest 1 View [[id number]] Resulted: 10/06/21 0815       Updated: 10/06/21 1032          Assessment & Plan    51M PMH DM Type II, reportedly tested positive for Covid 19 1 week prior, brought in unresponsive per EMS.      #Severe Sepsis/Septic Shock, Acute Metabolic Encephalopathy & Acute Hypoxic Respiratory Failure requiring intubation and mechanical ventilation 2/2 PNA, Viral, treating for Covid 19 & RSV c/b ARDS and pneumomediastinum but also covering for PNA, Bacterial, treating for MDR Organisms, also treating for PNA, Fungal w/ Candida sp. But now w/ c/f opportunistic infection w/ development of cavitary lesion RUL  - Patient presented w/ to ER via EMS and unresponsive at home, reportedly diagnosed w/ Covid 19 1 week prior, Glc read as \"high\" in the field, intubated upon arrival to ER 9/14.  HR > 90, RR > 20, WBC count > 12K, hypotensive requiring IVFs and pressors, covid +, PNA confirmed on " imaging.  Has been admitted and intubated for prolonged period of time, since 9/14, has been on broad spectrum Abx and antifungal therapy but w/ c/f worsening infection, imaging now w/ c/f RUL cavitary lesion.   - Admission labs showed WBC count 16K, CRP 2.12, lactate 6.1, procal 0.35->1.37, Covid 19 +, RSV +, ABG 6.9/23/46, Resp Cx growing candida sp, MRSA negative, Bcx's NGTD, repeat Bcx's 9/27 NGTD  - AFB Cx's pending, Resp culture growing yeast not candida sp  - Echo showed LVEF 56-60%, diastolic dysfunction, otherwise unremarkable   - B/l Venous Duplex negative for DVT  - CT Head showed no acute intracranial abnormality, atrophy and CSVID  - CT Chest w/o contrast showed B/l patchy GGO's typical for Covid 19, moderate disease, dense consolidation b/l LL's; repeat CTPE 9/26 showed no PE, pneumomediastinum, significant b/l GGO's w/ LL consolidation and new cavitary process w/ filling defect in RUL   - VQ scan (perfusion only) showed low suspicion for PE  - ID consulted; Following, s/p Remdesivir  - Pulm consulted; Following, rec'd 3 weeks Abx, repeat imaging 6-8 weeks   - Continue Vanc, Meropenem and have extended dosing today for 3 weeks total since cavitary lesion noted on 9/26, continue Voriconazole as per ID given elevated Aspergillus galactomannan Ag  - Continue extended course of Dexamethasone now daily  - Continue Heparin gtt given d-dimer > 20 though no definitive thrombus identified, covering for microvascular thormbi as well, caution given underlying cavitary lung lesion  - Continue Albuterol Inhaler PRN  - Continue IV pressors to keep MAPs > 65 or SBP > 90, levophed restarted with dialysis this AM. Currently 0.12.   - Continue pain and sedation gtt's as indicated  - Continue IV PPI for stress ulcer PPx  - Reviewed AM ABG and increased Fi02 due to mildly low P02, otherwise stable, repeat CXR pending today.   - Trend Covid 19 progression labs w/ CBC, CMP, CK, CRP, Ferritin daily and LDH, D-dimer,  Fibrinogen q48hrs.  - Monitor in CCU, enhanced droplet/contact isolation precautions, continue 02 but wean as able, SAT/SBT when appropriate, palliative care following for GOC conversations  - ABG this AM 7.305/53/98 on 70%, PEEP of 14. FiO2 weaned down to 60% and we are weaning PEEP also. Oxygenation improving.   - CTPE negative on 9/26, D-dimer has trended down, patient not tachycardic, and patient has been on heparin gtt. Will hold repeat CTPE for now.      #RUL cavitary lesion  - Galactomanan antibody positive, quantiferon gold indeterminate   - Pulmonology following. ID following.   - Continue antifungal coverage as above   - Will order AFBX3 from secretions obtained from ET tube. Yield probably less than BAL but does not appear bronchoscopy recommended. AFB culture NGTD.   - Repeat CT chest around 10/11.   - Discussed with ID, will leave in isolation.      #Elevated Troponins  - Labs showed trop 0.045, previously NML on admission, up 10/2 at 0.056  - EKG showed some c/f nonspecific ST/T wave abnormality overnight  - Echo previously unremarkable; Repeating limited echo  - Cards consulted; Following, rec'd heparin, ASA, plavix, statin  - On Heparin as per above, have started on ASA 81, holding on plavix for now given cavitary lung lesion and would be on triple therapy w/ increased likelihood to bleed, have started on low dose statin due to receiving Voriconizole which can increase concentrations, trending LFTs  - Continue to monitor on tele, trend trops, trend HR and BP     #Nonoliguric -> Oliguric JACKY 2/2 Sepsis/DKA w/ suspected ATN now on intermittent HD  - B/l Cr 0.7, was 2.8 on admission, trended up to 7.0 on 9/20. Renal US showed unremarkable kidney's, no obstruction noted  - Nephrology consulted; Following for iHD needs, some improved UOP      #Acute Pancreatitis, unclear etiology  - Admission labs showed WBC count 16K, CRP 2.12, Lipase > 3000, lactate 6.1, AST mild elevation, Tbili NML, TG's 136, YUDELKA  negative, RF NML, Anti-Smith Ab negative, Anti-DS DNA NML, SSA/SSB NML, C3 mildly low, C4 NML, complement borderline low, IGG4 NML, Bcx's NGTD.  CT showed inflammation surrounding the pancreas as well as gastric antrum and first portion of duodenum favoring acute pancreatitis w/ secondary inflammation of GI tract though also considered gastroduodenitis. GB US showed no evidence of cholelithiasis.  Given serum lipase > 3x ULN, characteristic findings on imaging, patient meets criteria for acute pancreatitis diagnosis.  Patient does have ongoing organ failure which is multifactorial, local complication of secondary GI tract inflammation and could be considered severe disease but truly is multifactorial at this point.  Etiology remains unclear, repeat CTPE 9/26 still showing marked abnormalities upper abdomen suspicious for pancreatitis.      #DKA c/b Acute Encephalopathy/DM Coma, Severe Systemic Acidosis in setting of NIDDM Type II, uncontrolled, now IDDM Type II - DKA Resolved  - Hgb A1c = 11.2%, no home oral agents or SQ insulin per meds rec, given severe encephalopathy/coma on presentation required intubation for airway protection. Admission labs showed Glc up to 1100, AG 39, Bicarb 2.8, Acetone moderate, ABG 6.9/23/46, K 4.5, lactate 6.1; Continue FSBG and SSI, continue Levemir 7U qhs and titrate as indicated     #Electrolyte Abnormalities  - Acute Mild Hyperkalemia - Likely related to renal failure, K up to 5.5, Resolved  - Borderline Hypomagnesemia - Mg down to 1.6, Replaced per protocol, Resolved   - Acute Mild Hypovolemic Hypernatremia (POA & Not POA) - Na up to 152 on admission, resolved then became hyponatremic, today Na improved at 128 corrected, dialysate per nephrology   - Acute Mild/Mod Hypocalcemia -  Replacing, on protocol. Corrected calcium 8.3.   - Acute Mild Hypophosphatemia - Phos as low as 2.0, Replaced per protocol, Resolved     #Anxiety/Depression  - Holding home Venlafaxine while intubated      F: TFs  A: Fentanyl  S: Propofol, versed, precedex  T: Heparin gtt  H: HOB elevated   U: Pantoprazole  G: SSI, basal  S: Not ready  B: Doc-senna and miralax  I: RIJ 9/14, Hemodialysis catheter 9/21  D: Vanc, meropenem, voriconazole     Code status: DNR/DNI. Grim Prognosis. SOFA score currently 13. Palliative care consulted. Family considering comfort measures. They do not think patient would want dialysis or other artifical life support that would negatively affect his quality of life long term. Patient's oxygenation improving but he is still critically ill. Palliative care to discuss again with family today.      Dispo: Continue CCU level care      I have spent 40 minutes of critical care time  with > 50% of time spent in direct patient care, evaluating the patient at bedside, reviewing all labs and images, reviewing ABG and adjusting vent settings, reviewing pain and sedation gtt's, reviewing pressor requirement, communicating plan of care w/ nursing staff, utilizing high complexity medical decision making to assess and treat vital organ system failure in an individual who has impairment of one or more vital organ systems such that there is a high probability of imminent or life threatening deterioration in the patient’s condition. Failure to initiate the above interventions on an urgent basis would likely result in sudden, clinically significant or life threatening deterioration in the patient's condition.        VTE Prophylaxis:       Mechanical Order History:      None               Pharmalogical Order History:                   Ordered     Dose Route Frequency Stop      09/28/21 1239   heparin (porcine) 5000 UNIT/ML injection 5,000 Units      5,000 Units IV Once 09/28/21 1439      09/28/21 1239   heparin 73271 units/250 mL (100 units/mL) in 0.45 % NaCl infusion  9.99 mL/hr      9.8 Units/kg/hr IV Titrated --      09/28/21 1239   heparin (porcine) 5000 UNIT/ML injection 5,000 Units      5,000 Units IV As  Needed --      09/28/21 1239   heparin (porcine) 5000 UNIT/ML injection 2,500 Units      2,500 Units IV As Needed --      09/23/21 1512   heparin (porcine) 5000 UNIT/ML injection 5,000 Units  Status:  Discontinued      5,000 Units SC Every 8 Hours Scheduled 09/28/21 1239      09/15/21 0440   heparin (porcine) 5000 UNIT/ML injection 5,000 Units      5,000 Units IV Once 09/15/21 0621      09/15/21 0440   heparin 37460 units/250 mL (100 units/mL) in 0.45 % NaCl infusion  8.73 mL/hr,   Status:  Discontinued      10 Units/kg/hr IV Titrated 09/23/21 1512      09/15/21 0440   heparin (porcine) 5000 UNIT/ML injection 5,000 Units  Status:  Discontinued      5,000 Units IV As Needed 09/23/21 1512      09/15/21 0440   heparin (porcine) 5000 UNIT/ML injection 2,500 Units  Status:  Discontinued      2,500 Units IV As Needed 09/23/21 1512                     Keegan Monte MD  [facility]  10/06/21  11:07 EDT                    Genet Yip MSW      Case Management   Case Management/Social Work   Signed   Date of Service:  10/06/21 1056   Creation Time:  10/06/21 1056              Signed              Show:Clear all  []Manual[x]Template[]Copied    Added by:  [x]Genet Yip MSW      [][place] for details    Discharge Planning Assessment  [facility]     Patient Name: Martinez Mckenna                   MRN: [id number]  Today's Date: 10/6/2021                     Admit Date: 9/14/2021          Discharge Plan      Row Name 10/06/21 1055           Plan     Plan Pt admitted 9/14/21. Pt remains intubated and sedated. Palliative following for GOC. Per MD note, pt will likely be changed to comfort measures if no improvement today. Pt is from home and does not utilize home health services or DME. SS following.      HERNESTO Duggan

## 2021-10-25 NOTE — DISCHARGE SUMMARY
"    [facility] DISCHARGE SUMMARY    Patient Identification:  Name:  Martinez Mckenna  Age:  51 y.o.  Sex:  male  :  1970  MRN:  [id number]  Visit Number:  [id number]    Date of Admission: 2021  Date of Discharge:  10/6/2021    PCP: Rafal Casillas MD    DISCHARGE DIAGNOSIS  #Severe Sepsis/Septic Shock, Acute Metabolic Encephalopathy & Acute Hypoxic Respiratory Failure requiring intubation and mechanical ventilation 2/2 PNA, Viral, treating for Covid 19 & RSV c/b ARDS and pneumomediastinum but also covering for PNA, Bacterial, treating for MDR Organisms, also treating for PNA, Fungal w/ Candida sp. But now w/ c/f opportunistic infection w/ development of cavitary lesion RUL  #RUL cavitary lesion  #Elevated Troponins  #Nonoliguric -> Oliguric JACKY 2/2 Sepsis/DKA w/ suspected ATN now on intermittent HD  #Acute Pancreatitis, unclear etiology  #DKA c/b Acute Encephalopathy/DM Coma, Severe Systemic Acidosis in setting of NIDDM Type II, uncontrolled, now IDDM Type II - DKA Resolved  #Electrolyte Abnormalities  #Anxiety/Depression    CONSULTS   Nephrology   Pulmonology   Infectious disease   Palliative care   Cardiology     PROCEDURES PERFORMED  Endotracheal intubation   Central line placement ,        HOSPITAL COURSE  Patient is a 51 y.o. male presented on 9/15 to [facility] after being found unresponsive, diagnosed with COVID-19 pneumonia around 1 month prior.  Please see the admitting history and physical for further details.      Mr. Mckenna was our 50 yo M with hx of DM II who presented after being found unresponsive in the field. Glucose reading was \"high\". Patient critically ill on presentation with severe acidosis in setting of DKA. Patient was also severely altered and hypoxic requiring intubation. Patient started on broad spectrum abx and antifungal coverage during prolonged hospitalization. Patient also given remdesivir. Patient started on insulin gtt for " DKA which resolved along with acidosis. Patient also developed JACKY requiring dialysis and was found to have pancreatitis with unclear etiology. Cavitary lesion also seen on CT that was being worked up. Palliative care consulted and multiple goals of care discussions were had with patient's family. Even though patient's oxygenation had been improving they decided that patient would not want further life support. Family reasonably made patient comfort care based on what they thought patient would want in this situation. They had realized he would have a long road to recovery and do not believe he would want prolonged life support or possibly having a poor quality of life if he did survive.  Patient terminally extubated and passed away with comfort measures on 10/6 at 1705.       VITAL SIGNS:        on   ;        Body mass index is 33.76 kg/m².  Wt Readings from Last 3 Encounters:   10/06/21 104 kg (228 lb 11.2 oz)   21 95.3 kg (210 lb)       PHYSICAL EXAM:  TOD exam performed by house supervisor     DISCHARGE DISPOSITION        DISCHARGE MEDICATIONS:     Discharge Medications      ASK your doctor about these medications      Instructions Start Date   albuterol sulfate  (90 Base) MCG/ACT inhaler  Commonly known as: PROVENTIL HFA;VENTOLIN HFA;PROAIR HFA   2 puffs, Inhalation, Every 6 Hours PRN      doxycycline 100 MG capsule  Commonly known as: VIBRAMYCIN  Ask about: Should I take this medication?   100 mg, Oral, 2 Times Daily      venlafaxine XR 37.5 MG 24 hr capsule  Commonly known as: EFFEXOR-XR   37.5 mg, Oral, Daily                Follow-up Information     Rafal Casillas MD .    Specialty: Family Medicine  Contact information:  [address] [place] [address] [address] [place] [address] [phone]                          TEST  RESULTS PENDING AT DISCHARGE  Pending Labs     Order Current Status    AFB Culture - Lavage, Cough Preliminary result           CODE STATUS  Code Status and Medical Interventions:   Ordered at:  10/06/21 8693     Level Of Support Discussed With:    Next of Kin (If No Surrogate)     Code Status:    No CPR     Medical Interventions (Level of Support Prior to Arrest):    Comfort Measures     Comments:    Pts father decided to make his son comfort measures       Keegan Monte MD  [facility] Hospitalist  10/25/21  13:09 EDT    Please note that this discharge summary required more than 30 minutes to complete.

## 2021-11-20 LAB
ACID FAST STN SPEC: NEGATIVE
MYCOBACTERIUM SPEC QL CULT: NEGATIVE
SPECIMEN PREPARATION: NORMAL